# Patient Record
Sex: FEMALE | Race: WHITE | Employment: OTHER | URBAN - METROPOLITAN AREA
[De-identification: names, ages, dates, MRNs, and addresses within clinical notes are randomized per-mention and may not be internally consistent; named-entity substitution may affect disease eponyms.]

---

## 2017-01-05 ENCOUNTER — HOSPITAL ENCOUNTER (OUTPATIENT)
Dept: MAMMOGRAPHY | Age: 67
Discharge: HOME | End: 2017-01-05
Attending: INTERNAL MEDICINE
Payer: MEDICARE

## 2017-01-05 DIAGNOSIS — Z12.31 ENCOUNTER FOR SCREENING MAMMOGRAM FOR MALIGNANT NEOPLASM OF BREAST: ICD-10-CM

## 2017-01-05 DIAGNOSIS — Z00.00 ROUTINE GENERAL MEDICAL EXAMINATION AT A HEALTH CARE FACILITY: ICD-10-CM

## 2017-01-05 PROCEDURE — 77067 SCR MAMMO BI INCL CAD: CPT

## 2017-01-25 ENCOUNTER — OFFICE VISIT (OUTPATIENT)
Dept: INTERNAL MEDICINE CLINIC | Facility: CLINIC | Age: 67
End: 2017-01-25

## 2017-01-25 ENCOUNTER — LAB ENCOUNTER (OUTPATIENT)
Dept: LAB | Age: 67
End: 2017-01-25
Attending: INTERNAL MEDICINE
Payer: MEDICARE

## 2017-01-25 VITALS
DIASTOLIC BLOOD PRESSURE: 76 MMHG | RESPIRATION RATE: 16 BRPM | WEIGHT: 198.81 LBS | SYSTOLIC BLOOD PRESSURE: 122 MMHG | BODY MASS INDEX: 33.94 KG/M2 | HEART RATE: 68 BPM | HEIGHT: 64 IN

## 2017-01-25 DIAGNOSIS — Z01.818 PRE-OP EVALUATION: ICD-10-CM

## 2017-01-25 DIAGNOSIS — E03.9 ACQUIRED HYPOTHYROIDISM: ICD-10-CM

## 2017-01-25 DIAGNOSIS — F41.9 ANXIETY: ICD-10-CM

## 2017-01-25 DIAGNOSIS — Z01.818 PRE-OP EVALUATION: Primary | ICD-10-CM

## 2017-01-25 LAB
ALBUMIN SERPL-MCNC: 3.9 G/DL (ref 3.5–4.8)
ALP LIVER SERPL-CCNC: 102 U/L (ref 55–142)
ALT SERPL-CCNC: 24 U/L (ref 14–54)
AST SERPL-CCNC: 19 U/L (ref 15–41)
BASOPHILS # BLD AUTO: 0.05 X10(3) UL (ref 0–0.1)
BASOPHILS NFR BLD AUTO: 0.8 %
BILIRUB SERPL-MCNC: 0.3 MG/DL (ref 0.1–2)
BUN BLD-MCNC: 24 MG/DL (ref 8–20)
CALCIUM BLD-MCNC: 9.7 MG/DL (ref 8.3–10.3)
CHLORIDE: 103 MMOL/L (ref 101–111)
CO2: 30 MMOL/L (ref 22–32)
CREAT BLD-MCNC: 0.9 MG/DL (ref 0.55–1.02)
EOSINOPHIL # BLD AUTO: 0.14 X10(3) UL (ref 0–0.3)
EOSINOPHIL NFR BLD AUTO: 2.2 %
ERYTHROCYTE [DISTWIDTH] IN BLOOD BY AUTOMATED COUNT: 12.9 % (ref 11.5–16)
GLUCOSE BLD-MCNC: 95 MG/DL (ref 70–99)
HCT VFR BLD AUTO: 40.6 % (ref 34–50)
HGB BLD-MCNC: 13.5 G/DL (ref 12–16)
IMMATURE GRANULOCYTE COUNT: 0.01 X10(3) UL (ref 0–1)
IMMATURE GRANULOCYTE RATIO %: 0.2 %
LYMPHOCYTES # BLD AUTO: 1.79 X10(3) UL (ref 0.9–4)
LYMPHOCYTES NFR BLD AUTO: 28.5 %
M PROTEIN MFR SERPL ELPH: 7.5 G/DL (ref 6.1–8.3)
MCH RBC QN AUTO: 30.5 PG (ref 27–33.2)
MCHC RBC AUTO-ENTMCNC: 33.3 G/DL (ref 31–37)
MCV RBC AUTO: 91.9 FL (ref 81–100)
MONOCYTES # BLD AUTO: 0.54 X10(3) UL (ref 0.1–0.6)
MONOCYTES NFR BLD AUTO: 8.6 %
NEUTROPHIL ABS PRELIM: 3.75 X10 (3) UL (ref 1.3–6.7)
NEUTROPHILS # BLD AUTO: 3.75 X10(3) UL (ref 1.3–6.7)
NEUTROPHILS NFR BLD AUTO: 59.7 %
PLATELET # BLD AUTO: 278 10(3)UL (ref 150–450)
POTASSIUM SERPL-SCNC: 4.8 MMOL/L (ref 3.6–5.1)
RBC # BLD AUTO: 4.42 X10(6)UL (ref 3.8–5.1)
RED CELL DISTRIBUTION WIDTH-SD: 43.2 FL (ref 35.1–46.3)
SODIUM SERPL-SCNC: 138 MMOL/L (ref 136–144)
WBC # BLD AUTO: 6.3 X10(3) UL (ref 4–13)

## 2017-01-25 PROCEDURE — 93000 ELECTROCARDIOGRAM COMPLETE: CPT | Performed by: INTERNAL MEDICINE

## 2017-01-25 PROCEDURE — 85025 COMPLETE CBC W/AUTO DIFF WBC: CPT

## 2017-01-25 PROCEDURE — 80053 COMPREHEN METABOLIC PANEL: CPT

## 2017-01-25 PROCEDURE — 99213 OFFICE O/P EST LOW 20 MIN: CPT | Performed by: INTERNAL MEDICINE

## 2017-01-25 RX ORDER — LORAZEPAM 1 MG/1
1 TABLET ORAL EVERY 8 HOURS PRN
Qty: 30 TABLET | Refills: 0 | Status: SHIPPED | OUTPATIENT
Start: 2017-01-25 | End: 2018-01-08

## 2017-01-25 NOTE — PROGRESS NOTES
HPI:    Patient ID: Hilton Ruiz is a 74year old female. HPI  Here for pre-op risk evaluation prior to surgery with Dr. Bladimir Dee, no cp or sob, hypothyroid.   Anxiety is currently controlled, rare xanax  /76 mmHg  Pulse 68  Resp 16  Ht 64\" No murmur heard. Pulmonary/Chest: Effort normal and breath sounds normal.   Abdominal: Soft. Musculoskeletal: She exhibits no edema. Neurological: She is oriented to person, place, and time. Skin: Skin is warm. She is not diaphoretic.    Psychiatri

## 2017-02-07 ENCOUNTER — ANESTHESIA EVENT (OUTPATIENT)
Dept: SURGERY | Facility: HOSPITAL | Age: 67
End: 2017-02-07
Payer: MEDICARE

## 2017-02-08 ENCOUNTER — APPOINTMENT (OUTPATIENT)
Dept: GENERAL RADIOLOGY | Facility: HOSPITAL | Age: 67
End: 2017-02-08
Attending: UROLOGY
Payer: MEDICARE

## 2017-02-08 ENCOUNTER — SURGERY (OUTPATIENT)
Age: 67
End: 2017-02-08

## 2017-02-08 ENCOUNTER — ANESTHESIA (OUTPATIENT)
Dept: SURGERY | Facility: HOSPITAL | Age: 67
End: 2017-02-08
Payer: MEDICARE

## 2017-02-08 PROCEDURE — 76000 FLUOROSCOPY <1 HR PHYS/QHP: CPT

## 2017-02-08 PROCEDURE — 72100 X-RAY EXAM L-S SPINE 2/3 VWS: CPT

## 2017-02-08 NOTE — ANESTHESIA PREPROCEDURE EVALUATION
PRE-OP EVALUATION    Patient Name: Yfn Richter    Pre-op Diagnosis: URINARY RETENTION    Procedure(s):  INTERSTIM STAGE I    Surgeon(s) and Role:     * Priscila Guadalupe MD - Primary    Pre-op vitals reviewed.   Temp: 97.7 °F (36.5 °C)  Pulse: 74  Re Neuro/Psych      (+) depression  (+) anxiety                                Past Surgical History    COLONOSCOPY  09/21/07    Comment diverticulosis, Dr. Demi Gottlieb, Repeat in 7-10 years    BREAST SURGERY PROCEDURE UNLISTED  10/2000    Comment breast karthik

## 2017-02-08 NOTE — ANESTHESIA POSTPROCEDURE EVALUATION
[address] Patient Status:  Hospital Outpatient Surgery   Age/Gender 74year old female MRN [id number]   [facility] SURGERY Attending Nikki Barragan MD   Hosp Day # 0 PCP Rafi Garcia MD       Anesthesia Post-

## 2017-02-21 ENCOUNTER — ANESTHESIA EVENT (OUTPATIENT)
Dept: SURGERY | Facility: HOSPITAL | Age: 67
End: 2017-02-21
Payer: MEDICARE

## 2017-02-22 ENCOUNTER — ANESTHESIA (OUTPATIENT)
Dept: SURGERY | Facility: HOSPITAL | Age: 67
End: 2017-02-22
Payer: MEDICARE

## 2017-02-22 ENCOUNTER — SURGERY (OUTPATIENT)
Age: 67
End: 2017-02-22

## 2017-02-22 NOTE — ANESTHESIA POSTPROCEDURE EVALUATION
[address] Patient Status:  Hospital Outpatient Surgery   Age/Gender 74year old female MRN [id number]   Location [address] [place] [address] Attending Micky Vargas MD   Psychiatric Day # 0 PCP Shiva Allen

## 2017-02-22 NOTE — ANESTHESIA PREPROCEDURE EVALUATION
PRE-OP EVALUATION    Patient Name: Anisha Rodriguez    Pre-op Diagnosis: URINARY RETENTION     Procedure(s):  INTERSTIM STAGE TWO OR REMOVAL OF DEVICE      Surgeon(s) and Role:     * Prosper Canela MD - Primary    Pre-op vitals reviewed.         Body m Oral Tab Take 325 mg by mouth daily. Disp:  Rfl:        Allergies: Amoxicillin; Bactrim; Codeine; Levaquin      Anesthesia Evaluation    Patient summary reviewed.     Anesthetic Complications  (-) history of anesthetic complications         GI/Hepatic/Renal Pulmonary    Pulmonary exam normal.  Breath sounds clear to auscultation bilaterally. Other findings            ASA: 2   Plan: MAC  NPO status verified and   Patient has not taken beta blockers in last 24 hours.   Post-procedure pain managemen

## 2017-03-03 PROBLEM — N31.9 NEUROGENIC BLADDER: Status: ACTIVE | Noted: 2017-03-03

## 2017-03-03 PROBLEM — R33.9 CHRONIC RETENTION OF URINE: Status: ACTIVE | Noted: 2017-03-03

## 2017-03-20 ENCOUNTER — OFFICE VISIT (OUTPATIENT)
Dept: INTERNAL MEDICINE CLINIC | Facility: CLINIC | Age: 67
End: 2017-03-20

## 2017-03-20 VITALS
WEIGHT: 204 LBS | RESPIRATION RATE: 16 BRPM | HEART RATE: 74 BPM | SYSTOLIC BLOOD PRESSURE: 120 MMHG | BODY MASS INDEX: 34.83 KG/M2 | TEMPERATURE: 98 F | HEIGHT: 64 IN | DIASTOLIC BLOOD PRESSURE: 82 MMHG

## 2017-03-20 DIAGNOSIS — F32.A DEPRESSION, UNSPECIFIED DEPRESSION TYPE: ICD-10-CM

## 2017-03-20 DIAGNOSIS — E78.1 HYPERTRIGLYCERIDEMIA: ICD-10-CM

## 2017-03-20 DIAGNOSIS — Z00.00 ROUTINE GENERAL MEDICAL EXAMINATION AT A HEALTH CARE FACILITY: Primary | ICD-10-CM

## 2017-03-20 DIAGNOSIS — R53.83 FATIGUE, UNSPECIFIED TYPE: ICD-10-CM

## 2017-03-20 DIAGNOSIS — E03.9 HYPOTHYROIDISM (ACQUIRED): ICD-10-CM

## 2017-03-20 PROCEDURE — G0439 PPPS, SUBSEQ VISIT: HCPCS | Performed by: INTERNAL MEDICINE

## 2017-03-20 RX ORDER — LEVOTHYROXINE SODIUM 0.07 MG/1
75 TABLET ORAL DAILY
Qty: 90 TABLET | Refills: 3 | Status: SHIPPED | OUTPATIENT
Start: 2017-03-20 | End: 2018-03-28

## 2017-03-20 RX ORDER — FENOFIBRATE 160 MG/1
TABLET ORAL
Qty: 90 TABLET | Refills: 3 | Status: SHIPPED | OUTPATIENT
Start: 2017-03-20 | End: 2018-05-05

## 2017-03-20 RX ORDER — CITALOPRAM 40 MG/1
TABLET ORAL
Qty: 90 TABLET | Refills: 3 | Status: SHIPPED | OUTPATIENT
Start: 2017-03-20 | End: 2018-03-26

## 2017-03-20 NOTE — PROGRESS NOTES
HPI:    Patient ID: Nathan Nathan is a 74year old female.     HPI  Here for awv, complains of occ fatigue, states she wonders if thyroid dose or depression med dose might need to be adjusted, see awv, last cscope by Naina Bae 7 years ago, need to clarif exhibits no discharge. Neck: Neck supple. Cardiovascular: Normal rate, regular rhythm and normal heart sounds. No murmur heard. Pulmonary/Chest: Effort normal and breath sounds normal. She has no wheezes.    Breast exam done shows no masses, no nipp

## 2017-03-29 ENCOUNTER — APPOINTMENT (OUTPATIENT)
Dept: LAB | Age: 67
End: 2017-03-29
Attending: INTERNAL MEDICINE
Payer: MEDICARE

## 2017-03-29 DIAGNOSIS — E78.1 HYPERTRIGLYCERIDEMIA: ICD-10-CM

## 2017-03-29 DIAGNOSIS — E03.9 HYPOTHYROIDISM (ACQUIRED): ICD-10-CM

## 2017-03-29 PROCEDURE — 84443 ASSAY THYROID STIM HORMONE: CPT

## 2017-03-29 PROCEDURE — 80061 LIPID PANEL: CPT

## 2017-03-29 PROCEDURE — 36415 COLL VENOUS BLD VENIPUNCTURE: CPT

## 2017-04-10 ENCOUNTER — OFFICE VISIT (OUTPATIENT)
Dept: INTERNAL MEDICINE CLINIC | Facility: CLINIC | Age: 67
End: 2017-04-10

## 2017-04-10 VITALS
BODY MASS INDEX: 35.34 KG/M2 | RESPIRATION RATE: 12 BRPM | DIASTOLIC BLOOD PRESSURE: 80 MMHG | HEIGHT: 64 IN | SYSTOLIC BLOOD PRESSURE: 124 MMHG | WEIGHT: 207 LBS | HEART RATE: 80 BPM | TEMPERATURE: 98 F

## 2017-04-10 DIAGNOSIS — R53.83 FATIGUE, UNSPECIFIED TYPE: Primary | ICD-10-CM

## 2017-04-10 DIAGNOSIS — E78.00 HYPERCHOLESTEROLEMIA: ICD-10-CM

## 2017-04-10 DIAGNOSIS — G47.10 HYPERSOMNIA: ICD-10-CM

## 2017-04-10 PROCEDURE — 99213 OFFICE O/P EST LOW 20 MIN: CPT | Performed by: INTERNAL MEDICINE

## 2017-04-10 NOTE — PROGRESS NOTES
HPI:    Patient ID: Audra George is a 74year old female.     HPI  Here with fatigue, states falling asleep in afternoon, no witnessed apnea, tsh ok, struggling with wt  /80 mmHg  Pulse 80  Temp(Src) 97.8 °F (36.6 °C) (Oral)  Resp 12  Ht 64\" fenofibrate, restart and recheck lipids in 6 weeks      Orders Placed This Encounter  Vitamin B12    Meds This Visit:  No prescriptions requested or ordered in this encounter    Imaging & Referrals:  OP REFERRAL TO DIAGNOSTIC SLEEP STUDY       [facility]#[id number]

## 2017-05-02 ENCOUNTER — OFFICE VISIT (OUTPATIENT)
Dept: SLEEP CENTER | Facility: HOSPITAL | Age: 67
End: 2017-05-02
Attending: INTERNAL MEDICINE
Payer: MEDICARE

## 2017-05-02 PROCEDURE — 95810 POLYSOM 6/> YRS 4/> PARAM: CPT

## 2017-05-04 NOTE — PROCEDURES
[address]       Accredited by the [facility] ([facility])    PATIENT'S NAME:        Chacha Ponce  ATTENDING PHYSICIAN:   Yosvany Loza M.D.   REFERRING PHYSICIAN:   HOSEA Whiting significant obstructive apneas or hypopneas. The total apnea-hypopnea index was 4 events per hour. The patient demonstrated primary snoring throughout the study. Throughout the study, the patient maintained an oxyhemoglobin saturation above 90%.     ECG:

## 2017-05-12 ENCOUNTER — OFFICE VISIT (OUTPATIENT)
Dept: INTERNAL MEDICINE CLINIC | Facility: CLINIC | Age: 67
End: 2017-05-12

## 2017-05-12 VITALS
DIASTOLIC BLOOD PRESSURE: 78 MMHG | RESPIRATION RATE: 16 BRPM | WEIGHT: 208 LBS | HEIGHT: 64 IN | BODY MASS INDEX: 35.51 KG/M2 | HEART RATE: 76 BPM | SYSTOLIC BLOOD PRESSURE: 116 MMHG

## 2017-05-12 DIAGNOSIS — F32.A DEPRESSION, UNSPECIFIED DEPRESSION TYPE: ICD-10-CM

## 2017-05-12 DIAGNOSIS — R53.82 CHRONIC FATIGUE: Primary | ICD-10-CM

## 2017-05-12 DIAGNOSIS — E78.00 HYPERCHOLESTEREMIA: ICD-10-CM

## 2017-05-12 PROCEDURE — 99213 OFFICE O/P EST LOW 20 MIN: CPT | Performed by: INTERNAL MEDICINE

## 2017-05-12 RX ORDER — BUPROPION HYDROCHLORIDE 150 MG/1
150 TABLET ORAL DAILY
Qty: 30 TABLET | Refills: 3 | Status: SHIPPED | OUTPATIENT
Start: 2017-05-12 | End: 2017-09-05

## 2017-05-12 NOTE — PROGRESS NOTES
HPI:    Patient ID: Marah Johasnen is a 74year old female.     HPI  Here for fatigue, no sleep apnea and feels mildly depressed and tired  /78 mmHg  Pulse 76  Resp 16  Ht 64\"  Wt 208 lb  BMI 35.69 kg/m2    Review of Systems   Constitutional: Po Placed This Encounter  Lipid Panel [E]  Vitamin B12 [E]  CBC W Differential W Platelet [E]    Meds This Visit:  Signed Prescriptions Disp Refills    BuPROPion HCl ER, XL, 150 MG Oral Tablet 24 Hr 30 tablet 3      Sig: Take 1 tablet (150 mg total) by mouth

## 2017-06-18 ENCOUNTER — HOSPITAL ENCOUNTER (OUTPATIENT)
Age: 67
Discharge: HOME | End: 2017-06-18
Attending: FAMILY MEDICINE
Payer: MEDICARE

## 2017-06-18 VITALS
SYSTOLIC BLOOD PRESSURE: 135 MMHG | BODY MASS INDEX: 33.29 KG/M2 | OXYGEN SATURATION: 97 % | TEMPERATURE: 98 F | WEIGHT: 195 LBS | DIASTOLIC BLOOD PRESSURE: 78 MMHG | RESPIRATION RATE: 14 BRPM | HEART RATE: 76 BPM | HEIGHT: 64 IN

## 2017-06-18 DIAGNOSIS — N30.00 ACUTE CYSTITIS WITHOUT HEMATURIA: Primary | ICD-10-CM

## 2017-06-18 PROCEDURE — 99214 OFFICE O/P EST MOD 30 MIN: CPT

## 2017-06-18 PROCEDURE — 87086 URINE CULTURE/COLONY COUNT: CPT | Performed by: FAMILY MEDICINE

## 2017-06-18 PROCEDURE — 87186 SC STD MICRODIL/AGAR DIL: CPT | Performed by: FAMILY MEDICINE

## 2017-06-18 PROCEDURE — 87088 URINE BACTERIA CULTURE: CPT | Performed by: FAMILY MEDICINE

## 2017-06-18 PROCEDURE — 81002 URINALYSIS NONAUTO W/O SCOPE: CPT | Performed by: FAMILY MEDICINE

## 2017-06-18 RX ORDER — NITROFURANTOIN 25; 75 MG/1; MG/1
100 CAPSULE ORAL 2 TIMES DAILY
Qty: 14 CAPSULE | Refills: 0 | Status: SHIPPED | OUTPATIENT
Start: 2017-06-18 | End: 2017-06-25

## 2017-06-18 NOTE — ED INITIAL ASSESSMENT (HPI)
Patient states she has noted a foul odor from her urine since Monday. Does have a history of neurogenic bladder and chronic urinary retention. Does self caths at home and brought in a urine spec.

## 2017-06-18 NOTE — ED PROVIDER NOTES
Patient Seen in: [address] [facility] [place]    History   Patient presents with:  Urinary Symptoms (urologic)    Stated Complaint: Poss UTI    HPI    15-year-old female who has a history of neurogenic bladder presents to the clinic today with chief c Levothyroxine Sodium 75 MCG Oral Tab,  Take 1 tablet (75 mcg total) by mouth daily. LORazepam 1 MG Oral Tab,  Take 1 tablet (1 mg total) by mouth every 8 (eight) hours as needed. aspirin 325 MG Oral Tab,  Take 325 mg by mouth daily.    Bethanechol Chlor kg/m2  SpO2 97%  Breastfeeding? No        Physical Exam    General appearance: alert, appears stated age and cooperative  Head: Normocephalic, without obvious abnormality, atraumatic  Eyes: conjunctivae/corneas clear. PERRL, EOM's intact. Fundi benign.   Ea

## 2017-06-20 NOTE — ED NOTES
Patient given macrobid, noted sensitive.    URINE CULTURE, ROUTINE   Status: Final result     Visible to patient:  Not Released                   Specimen Information: Urine, clean catch; Urine                  Culture        >100,000 CFU/ML Escherichia col

## 2017-06-22 PROBLEM — R33.9 URINARY RETENTION: Status: ACTIVE | Noted: 2017-06-22

## 2017-07-05 ENCOUNTER — LAB ENCOUNTER (OUTPATIENT)
Dept: LAB | Age: 67
End: 2017-07-05
Attending: INTERNAL MEDICINE
Payer: MEDICARE

## 2017-07-05 DIAGNOSIS — E78.00 HYPERCHOLESTEREMIA: ICD-10-CM

## 2017-07-05 DIAGNOSIS — E78.00 HYPERCHOLESTEROLEMIA: ICD-10-CM

## 2017-07-05 DIAGNOSIS — R53.83 FATIGUE, UNSPECIFIED TYPE: ICD-10-CM

## 2017-07-05 DIAGNOSIS — R53.82 CHRONIC FATIGUE: ICD-10-CM

## 2017-07-05 LAB
BASOPHILS # BLD AUTO: 0.02 X10(3) UL (ref 0–0.1)
BASOPHILS NFR BLD AUTO: 0.5 %
CHOLEST SMN-MCNC: 194 MG/DL (ref ?–200)
EOSINOPHIL # BLD AUTO: 0.11 X10(3) UL (ref 0–0.3)
EOSINOPHIL NFR BLD AUTO: 2.5 %
ERYTHROCYTE [DISTWIDTH] IN BLOOD BY AUTOMATED COUNT: 13 % (ref 11.5–16)
HAV AB SERPL IA-ACNC: 434 PG/ML (ref 193–986)
HCT VFR BLD AUTO: 34.7 % (ref 34–50)
HDLC SERPL-MCNC: 63 MG/DL (ref 45–?)
HDLC SERPL: 3.08 {RATIO} (ref ?–4.44)
HGB BLD-MCNC: 11 G/DL (ref 12–16)
IMMATURE GRANULOCYTE COUNT: 0.01 X10(3) UL (ref 0–1)
IMMATURE GRANULOCYTE RATIO %: 0.2 %
LDLC SERPL CALC-MCNC: 111 MG/DL (ref ?–130)
LYMPHOCYTES # BLD AUTO: 1.58 X10(3) UL (ref 0.9–4)
LYMPHOCYTES NFR BLD AUTO: 36.2 %
MCH RBC QN AUTO: 30.6 PG (ref 27–33.2)
MCHC RBC AUTO-ENTMCNC: 31.7 G/DL (ref 31–37)
MCV RBC AUTO: 96.4 FL (ref 81–100)
MONOCYTES # BLD AUTO: 0.54 X10(3) UL (ref 0.1–0.6)
MONOCYTES NFR BLD AUTO: 12.4 %
NEUTROPHIL ABS PRELIM: 2.1 X10 (3) UL (ref 1.3–6.7)
NEUTROPHILS # BLD AUTO: 2.1 X10(3) UL (ref 1.3–6.7)
NEUTROPHILS NFR BLD AUTO: 48.2 %
NONHDLC SERPL-MCNC: 131 MG/DL (ref ?–130)
PLATELET # BLD AUTO: 257 10(3)UL (ref 150–450)
RBC # BLD AUTO: 3.6 X10(6)UL (ref 3.8–5.1)
RED CELL DISTRIBUTION WIDTH-SD: 46.1 FL (ref 35.1–46.3)
TRIGLYCERIDES: 102 MG/DL (ref ?–150)
VLDL: 20 MG/DL (ref 5–40)
WBC # BLD AUTO: 4.4 X10(3) UL (ref 4–13)

## 2017-07-05 PROCEDURE — 80061 LIPID PANEL: CPT

## 2017-07-05 PROCEDURE — 85025 COMPLETE CBC W/AUTO DIFF WBC: CPT

## 2017-07-05 PROCEDURE — 82607 VITAMIN B-12: CPT

## 2017-07-05 PROCEDURE — 36415 COLL VENOUS BLD VENIPUNCTURE: CPT

## 2017-07-06 DIAGNOSIS — D64.9 ANEMIA, UNSPECIFIED TYPE: Primary | ICD-10-CM

## 2017-07-14 PROCEDURE — 81015 MICROSCOPIC EXAM OF URINE: CPT | Performed by: UROLOGY

## 2017-07-20 ENCOUNTER — LAB ENCOUNTER (OUTPATIENT)
Dept: LAB | Age: 67
End: 2017-07-20
Attending: INTERNAL MEDICINE
Payer: MEDICARE

## 2017-07-20 DIAGNOSIS — D64.9 ANEMIA, UNSPECIFIED TYPE: ICD-10-CM

## 2017-07-20 LAB
BASOPHILS # BLD AUTO: 0.04 X10(3) UL (ref 0–0.1)
BASOPHILS NFR BLD AUTO: 0.8 %
DEPRECATED HBV CORE AB SER IA-ACNC: 73.8 NG/ML (ref 10–291)
EOSINOPHIL # BLD AUTO: 0.12 X10(3) UL (ref 0–0.3)
EOSINOPHIL NFR BLD AUTO: 2.4 %
ERYTHROCYTE [DISTWIDTH] IN BLOOD BY AUTOMATED COUNT: 12.4 % (ref 11.5–16)
HCT VFR BLD AUTO: 37.9 % (ref 34–50)
HGB BLD-MCNC: 12.3 G/DL (ref 12–16)
IMMATURE GRANULOCYTE COUNT: 0.01 X10(3) UL (ref 0–1)
IMMATURE GRANULOCYTE RATIO %: 0.2 %
IRON SATURATION: 16 % (ref 13–45)
IRON: 76 UG/DL (ref 28–170)
LYMPHOCYTES # BLD AUTO: 2.04 X10(3) UL (ref 0.9–4)
LYMPHOCYTES NFR BLD AUTO: 40.7 %
MCH RBC QN AUTO: 30.4 PG (ref 27–33.2)
MCHC RBC AUTO-ENTMCNC: 32.5 G/DL (ref 31–37)
MCV RBC AUTO: 93.6 FL (ref 81–100)
MONOCYTES # BLD AUTO: 0.64 X10(3) UL (ref 0.1–0.6)
MONOCYTES NFR BLD AUTO: 12.8 %
NEUTROPHIL ABS PRELIM: 2.16 X10 (3) UL (ref 1.3–6.7)
NEUTROPHILS # BLD AUTO: 2.16 X10(3) UL (ref 1.3–6.7)
NEUTROPHILS NFR BLD AUTO: 43.1 %
PLATELET # BLD AUTO: 324 10(3)UL (ref 150–450)
RBC # BLD AUTO: 4.05 X10(6)UL (ref 3.8–5.1)
RED CELL DISTRIBUTION WIDTH-SD: 42.6 FL (ref 35.1–46.3)
RETIC ABS CALC AUTO: 55.5 X10(3) UL (ref 22.5–147.5)
RETIC IRF CALC: 0.08 RATIO (ref 0.09–0.3)
RETIC%: 1.4 % (ref 0.5–2.5)
RETICULOCYTE HEMOGLOBIN EQUIVALENT: 36.1 PG (ref 28.2–36.3)
TOTAL IRON BINDING CAPACITY: 477 UG/DL (ref 298–536)
TRANSFERRIN: 320 MG/DL (ref 200–360)
WBC # BLD AUTO: 5 X10(3) UL (ref 4–13)

## 2017-07-20 PROCEDURE — 36415 COLL VENOUS BLD VENIPUNCTURE: CPT

## 2017-07-20 PROCEDURE — 82728 ASSAY OF FERRITIN: CPT

## 2017-07-20 PROCEDURE — 85045 AUTOMATED RETICULOCYTE COUNT: CPT

## 2017-07-20 PROCEDURE — 83550 IRON BINDING TEST: CPT

## 2017-07-20 PROCEDURE — 83540 ASSAY OF IRON: CPT

## 2017-07-20 PROCEDURE — 85025 COMPLETE CBC W/AUTO DIFF WBC: CPT

## 2017-08-17 RX ORDER — LORAZEPAM 1 MG/1
1 TABLET ORAL EVERY 8 HOURS PRN
Qty: 30 TABLET | Refills: 0 | Status: CANCELLED
Start: 2017-08-17

## 2017-09-05 RX ORDER — BUPROPION HYDROCHLORIDE 150 MG/1
TABLET ORAL
Qty: 30 TABLET | Refills: 3 | Status: SHIPPED | OUTPATIENT
Start: 2017-09-05 | End: 2018-01-04

## 2017-10-07 ENCOUNTER — HOSPITAL ENCOUNTER (OUTPATIENT)
Age: 67
Discharge: HOME | End: 2017-10-07
Attending: EMERGENCY MEDICINE
Payer: MEDICARE

## 2017-10-07 ENCOUNTER — APPOINTMENT (OUTPATIENT)
Dept: CT IMAGING | Age: 67
End: 2017-10-07
Attending: EMERGENCY MEDICINE
Payer: MEDICARE

## 2017-10-07 VITALS
HEART RATE: 68 BPM | DIASTOLIC BLOOD PRESSURE: 69 MMHG | RESPIRATION RATE: 16 BRPM | OXYGEN SATURATION: 98 % | SYSTOLIC BLOOD PRESSURE: 130 MMHG | TEMPERATURE: 98 F

## 2017-10-07 DIAGNOSIS — N30.00 ACUTE CYSTITIS WITHOUT HEMATURIA: ICD-10-CM

## 2017-10-07 DIAGNOSIS — K63.89 EPIPLOIC APPENDAGITIS: Primary | ICD-10-CM

## 2017-10-07 PROCEDURE — 74176 CT ABD & PELVIS W/O CONTRAST: CPT | Performed by: EMERGENCY MEDICINE

## 2017-10-07 PROCEDURE — 87077 CULTURE AEROBIC IDENTIFY: CPT | Performed by: EMERGENCY MEDICINE

## 2017-10-07 PROCEDURE — 85025 COMPLETE CBC W/AUTO DIFF WBC: CPT | Performed by: EMERGENCY MEDICINE

## 2017-10-07 PROCEDURE — 87186 SC STD MICRODIL/AGAR DIL: CPT | Performed by: EMERGENCY MEDICINE

## 2017-10-07 PROCEDURE — 80047 BASIC METABLC PNL IONIZED CA: CPT

## 2017-10-07 PROCEDURE — 81002 URINALYSIS NONAUTO W/O SCOPE: CPT | Performed by: EMERGENCY MEDICINE

## 2017-10-07 PROCEDURE — 87086 URINE CULTURE/COLONY COUNT: CPT | Performed by: EMERGENCY MEDICINE

## 2017-10-07 PROCEDURE — 99214 OFFICE O/P EST MOD 30 MIN: CPT

## 2017-10-07 RX ORDER — CEPHALEXIN 500 MG/1
500 CAPSULE ORAL 2 TIMES DAILY
Qty: 14 CAPSULE | Refills: 0 | Status: SHIPPED | OUTPATIENT
Start: 2017-10-07 | End: 2017-10-14

## 2017-10-07 NOTE — ED PROVIDER NOTES
Patient presents with:  Abdominal Pain    HPI:     Brian Rocha is a 74year old female with hx of neurogenic bladder (self caths and has stimulator) who presents with chief complaint of RLQ abdominal pain.   Started 3 days ago while visiting her mot Abnormal; Notable for the following:     ISTAT BUN 23 (*)     All other components within normal limits   POCT CBC - Normal   URINE CULTURE, ROUTINE     Ct Appendix Abd/pel Wo Contrast (cpt=74176)    Result Date: 10/7/2017  PROCEDURE:  CT APPENDIX ABD/PEL Mild degenerative disease. CONCLUSION:  #1. Epiploic appendigitis within the right lower quadrant. #2. There is a 1.2 cm left adrenal adenoma. #3. Colonic diverticulosis without evidence of diverticulitis.     Dictated by: Shira Henderson MD on 10/07

## 2017-10-07 NOTE — ED INITIAL ASSESSMENT (HPI)
Pt presents to [facility] with abdominal pain since waking Thursday. Pt points to right lower quadrant, and rates pain 8/10 with movement. Pt denies fever,nausea, diarrhea, or vomiting. Last normal BM this morning.  Pt has neurogenic bladder with retention, and st

## 2017-11-07 ENCOUNTER — APPOINTMENT (OUTPATIENT)
Dept: GENERAL RADIOLOGY | Age: 67
End: 2017-11-07
Attending: EMERGENCY MEDICINE
Payer: MEDICARE

## 2017-11-07 ENCOUNTER — HOSPITAL ENCOUNTER (OUTPATIENT)
Age: 67
Discharge: HOME | End: 2017-11-07
Attending: EMERGENCY MEDICINE
Payer: MEDICARE

## 2017-11-07 VITALS
HEART RATE: 70 BPM | TEMPERATURE: 98 F | SYSTOLIC BLOOD PRESSURE: 110 MMHG | OXYGEN SATURATION: 98 % | RESPIRATION RATE: 16 BRPM | DIASTOLIC BLOOD PRESSURE: 72 MMHG

## 2017-11-07 DIAGNOSIS — J40 BRONCHITIS: Primary | ICD-10-CM

## 2017-11-07 PROCEDURE — 99214 OFFICE O/P EST MOD 30 MIN: CPT

## 2017-11-07 PROCEDURE — 71020 XR CHEST PA + LAT CHEST (CPT=71020): CPT | Performed by: EMERGENCY MEDICINE

## 2017-11-07 PROCEDURE — 87081 CULTURE SCREEN ONLY: CPT | Performed by: EMERGENCY MEDICINE

## 2017-11-07 PROCEDURE — 87430 STREP A AG IA: CPT | Performed by: EMERGENCY MEDICINE

## 2017-11-07 RX ORDER — BENZONATATE 200 MG/1
200 CAPSULE ORAL 3 TIMES DAILY PRN
Qty: 30 CAPSULE | Refills: 0 | Status: SHIPPED | OUTPATIENT
Start: 2017-11-07 | End: 2017-12-07

## 2017-11-07 RX ORDER — DOXYCYCLINE HYCLATE 100 MG/1
100 CAPSULE ORAL 2 TIMES DAILY
Qty: 20 CAPSULE | Refills: 0 | Status: SHIPPED | OUTPATIENT
Start: 2017-11-07 | End: 2017-11-17

## 2017-11-07 NOTE — ED PROVIDER NOTES
Patient presents with:  Sore Throat    HPI:     Audra George is a 74year old female who presents with chief complaint of sore throat, cough. Started a week ago with n/v/d and sore throat. The n/v/d ended after about 24 hours.   Then progressed in Xr Chest Pa + Lat Chest (pcd=96271)    Result Date: 11/7/2017  PROCEDURE:  XR CHEST PA + LAT CHEST (CPT=71020)  INDICATIONS:  coughing sore throat fever  COMPARISON:  [facility], CHEST PA   LATERAL, 2/04/2010, 15:42.   TECHNIQUE:  RAGHAVENDRA batista

## 2017-11-07 NOTE — ED INITIAL ASSESSMENT (HPI)
One ago had vomiting and diarrhea with a fever, cough and sore throat started 5 days, undocumented fever for off and on for one week.

## 2017-11-08 NOTE — ADDENDUM NOTE
Addendum  created 02/22/17 0940 by Asiya Dominguez MD    Modules edited: Orders, PRL Based Order Sets EOMI; PERRL; no drainage or redness

## 2017-12-13 ENCOUNTER — TELEPHONE (OUTPATIENT)
Dept: INTERNAL MEDICINE CLINIC | Facility: CLINIC | Age: 67
End: 2017-12-13

## 2017-12-13 DIAGNOSIS — Z12.31 ENCOUNTER FOR SCREENING MAMMOGRAM FOR MALIGNANT NEOPLASM OF BREAST: Primary | ICD-10-CM

## 2018-01-04 RX ORDER — BUPROPION HYDROCHLORIDE 150 MG/1
TABLET, EXTENDED RELEASE ORAL
Qty: 30 TABLET | Refills: 3 | Status: SHIPPED | OUTPATIENT
Start: 2018-01-04 | End: 2018-01-08

## 2018-01-04 NOTE — TELEPHONE ENCOUNTER
LOV: 5/12/17  Future office visit: 1/8/18  Last labs: 7/20/17 Iron Ferritin Cbc 7/5/17 Lipid   Last RX: 9/5/17 #30 #3 Refill  Per protocol: Route to provider

## 2018-01-08 ENCOUNTER — OFFICE VISIT (OUTPATIENT)
Dept: INTERNAL MEDICINE CLINIC | Facility: CLINIC | Age: 68
End: 2018-01-08

## 2018-01-08 VITALS
HEIGHT: 64 IN | SYSTOLIC BLOOD PRESSURE: 124 MMHG | BODY MASS INDEX: 35.51 KG/M2 | WEIGHT: 208 LBS | HEART RATE: 86 BPM | DIASTOLIC BLOOD PRESSURE: 72 MMHG | TEMPERATURE: 98 F | RESPIRATION RATE: 16 BRPM

## 2018-01-08 DIAGNOSIS — Z12.11 SPECIAL SCREENING FOR MALIGNANT NEOPLASMS, COLON: ICD-10-CM

## 2018-01-08 DIAGNOSIS — F32.A DEPRESSION, UNSPECIFIED DEPRESSION TYPE: Primary | ICD-10-CM

## 2018-01-08 PROCEDURE — 99213 OFFICE O/P EST LOW 20 MIN: CPT | Performed by: INTERNAL MEDICINE

## 2018-01-08 RX ORDER — BUPROPION HYDROCHLORIDE 300 MG/1
300 TABLET ORAL DAILY
Qty: 30 TABLET | Refills: 3 | Status: SHIPPED | OUTPATIENT
Start: 2018-01-08 | End: 2018-05-22

## 2018-01-08 RX ORDER — INFLUENZA A VIRUS A/CHRISTCHURCH/16/2010 NIB-74 (H1N1) HEMAGGLUTININ ANTIGEN (PROPIOLACTONE INACTIVATED), INFLUENZA A VIRUS A/SWITZERLAND/9715293/2013, NIB-88 (H3N2) HEMAGGLUTININ ANTIGEN (PROPIOLACTONE INACTIVATED), INFLUENZA B VIRUS B/PHUKET/3073/2013 - WILD TYPE HEMAGGLUTININ ANTIGEN (PROPIOLACTONE INACTIVATED) 15; 15; 15 UG/.5ML; UG/.5ML; UG/.5ML
INJECTION, SUSPENSION INTRAMUSCULAR
Refills: 0 | COMMUNITY
Start: 2017-12-06 | End: 2018-01-08

## 2018-01-08 RX ORDER — LORAZEPAM 1 MG/1
1 TABLET ORAL EVERY 8 HOURS PRN
Qty: 20 TABLET | Refills: 0 | Status: SHIPPED | OUTPATIENT
Start: 2018-01-08 | End: 2018-06-01

## 2018-01-08 NOTE — PROGRESS NOTES
HPI:    Patient ID: Satish Plummer is a 69year old female.     HPI  Here overdue for fu on depression, better but still not optimal, not suicidal  /72 (BP Location: Left arm, Patient Position: Sitting, Cuff Size: adult)   Pulse 86   Temp 97.9 °F in this encounter. Meds This Visit:  Pending Prescriptions Disp Refills    LORazepam 1 MG Oral Tab 30 tablet 0     Sig: Take 1 tablet (1 mg total) by mouth every 8 (eight) hours as needed.          Imaging & Referrals:  None       [id number]

## 2018-01-11 ENCOUNTER — HOSPITAL ENCOUNTER (OUTPATIENT)
Dept: MAMMOGRAPHY | Age: 68
Discharge: HOME | End: 2018-01-11
Attending: INTERNAL MEDICINE
Payer: MEDICARE

## 2018-01-11 DIAGNOSIS — Z12.31 ENCOUNTER FOR SCREENING MAMMOGRAM FOR MALIGNANT NEOPLASM OF BREAST: ICD-10-CM

## 2018-01-11 PROCEDURE — 77067 SCR MAMMO BI INCL CAD: CPT | Performed by: INTERNAL MEDICINE

## 2018-03-15 RX ORDER — CEFAZOLIN SODIUM/WATER 2 G/20 ML
2 SYRINGE (ML) INTRAVENOUS ONCE
Status: DISCONTINUED | OUTPATIENT
Start: 2018-03-15 | End: 2018-04-09

## 2018-03-15 RX ORDER — SODIUM CHLORIDE, SODIUM LACTATE, POTASSIUM CHLORIDE, CALCIUM CHLORIDE 600; 310; 30; 20 MG/100ML; MG/100ML; MG/100ML; MG/100ML
INJECTION, SOLUTION INTRAVENOUS CONTINUOUS
Status: CANCELLED | OUTPATIENT
Start: 2018-03-15

## 2018-03-15 RX ORDER — CEFAZOLIN SODIUM/WATER 2 G/20 ML
2 SYRINGE (ML) INTRAVENOUS ONCE
Status: CANCELLED | OUTPATIENT
Start: 2018-03-15 | End: 2018-03-15

## 2018-03-23 ENCOUNTER — OFFICE VISIT (OUTPATIENT)
Dept: INTERNAL MEDICINE CLINIC | Facility: CLINIC | Age: 68
End: 2018-03-23

## 2018-03-23 VITALS
TEMPERATURE: 98 F | BODY MASS INDEX: 34.57 KG/M2 | HEART RATE: 72 BPM | DIASTOLIC BLOOD PRESSURE: 80 MMHG | RESPIRATION RATE: 12 BRPM | HEIGHT: 64.5 IN | SYSTOLIC BLOOD PRESSURE: 122 MMHG | WEIGHT: 205 LBS

## 2018-03-23 DIAGNOSIS — E78.1 HYPERTRIGLYCERIDEMIA: ICD-10-CM

## 2018-03-23 DIAGNOSIS — F32.A DEPRESSION, UNSPECIFIED DEPRESSION TYPE: ICD-10-CM

## 2018-03-23 DIAGNOSIS — Z01.818 PRE-OP EVALUATION: Primary | ICD-10-CM

## 2018-03-23 PROCEDURE — 99213 OFFICE O/P EST LOW 20 MIN: CPT | Performed by: INTERNAL MEDICINE

## 2018-03-23 PROCEDURE — 93000 ELECTROCARDIOGRAM COMPLETE: CPT | Performed by: INTERNAL MEDICINE

## 2018-03-23 NOTE — PROGRESS NOTES
HPI:    Patient ID: Satish Plummer is a 69year old female. HPI  Here for pre op risk evaluation at the request of Dr. Matthew Richard.   Depression well controlled, much improved with Wellbutrin, anxiety and hypothyroidism  /80 (BP Location: Right arm, well-nourished. HENT:   Head: Normocephalic and atraumatic. Right Ear: External ear normal.   Left Ear: External ear normal.   Eyes: Pupils are equal, round, and reactive to light. Right eye exhibits no discharge. Neck: Neck supple.    Cardiovascular:

## 2018-03-26 ENCOUNTER — LAB ENCOUNTER (OUTPATIENT)
Dept: LAB | Age: 68
End: 2018-03-26
Attending: INTERNAL MEDICINE
Payer: MEDICARE

## 2018-03-26 DIAGNOSIS — Z01.818 PRE-OP EVALUATION: ICD-10-CM

## 2018-03-26 LAB
ALBUMIN SERPL-MCNC: 3.8 G/DL (ref 3.5–4.8)
ALP LIVER SERPL-CCNC: 72 U/L (ref 55–142)
ALT SERPL-CCNC: 18 U/L (ref 14–54)
AST SERPL-CCNC: 16 U/L (ref 15–41)
BASOPHILS # BLD AUTO: 0.04 X10(3) UL (ref 0–0.1)
BASOPHILS NFR BLD AUTO: 0.8 %
BILIRUB SERPL-MCNC: 0.4 MG/DL (ref 0.1–2)
BUN BLD-MCNC: 20 MG/DL (ref 8–20)
CALCIUM BLD-MCNC: 9.5 MG/DL (ref 8.3–10.3)
CHLORIDE: 108 MMOL/L (ref 101–111)
CO2: 27 MMOL/L (ref 22–32)
CREAT BLD-MCNC: 1 MG/DL (ref 0.55–1.02)
EOSINOPHIL # BLD AUTO: 0.14 X10(3) UL (ref 0–0.3)
EOSINOPHIL NFR BLD AUTO: 2.9 %
ERYTHROCYTE [DISTWIDTH] IN BLOOD BY AUTOMATED COUNT: 13.1 % (ref 11.5–16)
GLUCOSE BLD-MCNC: 97 MG/DL (ref 70–99)
HCT VFR BLD AUTO: 39 % (ref 34–50)
HGB BLD-MCNC: 12.6 G/DL (ref 12–16)
IMMATURE GRANULOCYTE COUNT: 0.03 X10(3) UL (ref 0–1)
IMMATURE GRANULOCYTE RATIO %: 0.6 %
LYMPHOCYTES # BLD AUTO: 1.36 X10(3) UL (ref 0.9–4)
LYMPHOCYTES NFR BLD AUTO: 28.5 %
M PROTEIN MFR SERPL ELPH: 7.3 G/DL (ref 6.1–8.3)
MCH RBC QN AUTO: 30.5 PG (ref 27–33.2)
MCHC RBC AUTO-ENTMCNC: 32.3 G/DL (ref 31–37)
MCV RBC AUTO: 94.4 FL (ref 81–100)
MONOCYTES # BLD AUTO: 0.47 X10(3) UL (ref 0.1–1)
MONOCYTES NFR BLD AUTO: 9.9 %
NEUTROPHIL ABS PRELIM: 2.73 X10 (3) UL (ref 1.3–6.7)
NEUTROPHILS # BLD AUTO: 2.73 X10(3) UL (ref 1.3–6.7)
NEUTROPHILS NFR BLD AUTO: 57.3 %
PLATELET # BLD AUTO: 308 10(3)UL (ref 150–450)
POTASSIUM SERPL-SCNC: 5.3 MMOL/L (ref 3.6–5.1)
RBC # BLD AUTO: 4.13 X10(6)UL (ref 3.8–5.1)
RED CELL DISTRIBUTION WIDTH-SD: 45.3 FL (ref 35.1–46.3)
SODIUM SERPL-SCNC: 139 MMOL/L (ref 136–144)
WBC # BLD AUTO: 4.8 X10(3) UL (ref 4–13)

## 2018-03-26 PROCEDURE — 36415 COLL VENOUS BLD VENIPUNCTURE: CPT

## 2018-03-26 PROCEDURE — 80053 COMPREHEN METABOLIC PANEL: CPT

## 2018-03-26 PROCEDURE — 85025 COMPLETE CBC W/AUTO DIFF WBC: CPT

## 2018-03-26 RX ORDER — CITALOPRAM 40 MG/1
40 TABLET ORAL DAILY
Qty: 90 TABLET | Refills: 1 | Status: SHIPPED | OUTPATIENT
Start: 2018-03-26 | End: 2018-10-03

## 2018-03-26 NOTE — TELEPHONE ENCOUNTER
Last Office Visit: 3-23-18 with EMILY for pre op   Last Rx Filled: 3-20-17 90 tabs with 3 refills   Last Labs: 3-26-18 cbc/cmp  Future Appointment: 6-1-18    Per protocol to provider

## 2018-03-27 ENCOUNTER — TELEPHONE (OUTPATIENT)
Dept: INTERNAL MEDICINE CLINIC | Facility: CLINIC | Age: 68
End: 2018-03-27

## 2018-03-27 DIAGNOSIS — E87.5 HYPERKALEMIA: Primary | ICD-10-CM

## 2018-03-27 NOTE — PROGRESS NOTES
Mild hyperkalemia. Have pt avoid high K foods (bananas, oranges etc) and repeat BMP tomorrow. These were pre-op labs. I entered BMP order.

## 2018-03-27 NOTE — TELEPHONE ENCOUNTER
Refaxed correct paperwork to [facility] at Dr. Faheem Reed office as requested. Confirmation received. EKG sent to scan, copy filed in Preoperative paperwork at Triage.

## 2018-03-27 NOTE — TELEPHONE ENCOUNTER
Joey Polk from [facility] surgery scheduling at Dr. Jeevan Arreola office called and stated that she received a fax with the fax cover having the name Paige Aguayo and the EKG and H&P was for the pt Imelda Guerra.  Joey Polk said that she will shred that and if we co

## 2018-03-28 RX ORDER — LEVOTHYROXINE SODIUM 0.07 MG/1
TABLET ORAL
Qty: 90 TABLET | Refills: 1 | Status: SHIPPED | OUTPATIENT
Start: 2018-03-28 | End: 2018-09-21

## 2018-04-03 ENCOUNTER — LAB ENCOUNTER (OUTPATIENT)
Dept: LAB | Age: 68
End: 2018-04-03
Attending: PHYSICIAN ASSISTANT
Payer: MEDICARE

## 2018-04-03 DIAGNOSIS — E87.5 HYPERKALEMIA: ICD-10-CM

## 2018-04-03 PROCEDURE — 36415 COLL VENOUS BLD VENIPUNCTURE: CPT

## 2018-04-03 PROCEDURE — 80048 BASIC METABOLIC PNL TOTAL CA: CPT

## 2018-04-05 NOTE — PROGRESS NOTES
Hyperkalemia has resolved. Cr is very mildly elevated. Appears pt may be a little dehydrated. Hydrate well and recheck BMP in 4 weeks (non-fasting).

## 2018-04-09 RX ORDER — SODIUM CHLORIDE, SODIUM LACTATE, POTASSIUM CHLORIDE, CALCIUM CHLORIDE 600; 310; 30; 20 MG/100ML; MG/100ML; MG/100ML; MG/100ML
INJECTION, SOLUTION INTRAVENOUS CONTINUOUS
Status: CANCELLED | OUTPATIENT
Start: 2018-04-09

## 2018-04-09 RX ORDER — CEFAZOLIN SODIUM/WATER 2 G/20 ML
2 SYRINGE (ML) INTRAVENOUS ONCE
Status: CANCELLED | OUTPATIENT
Start: 2018-04-09 | End: 2018-04-09

## 2018-04-10 ENCOUNTER — SURGERY (OUTPATIENT)
Age: 68
End: 2018-04-10

## 2018-04-10 ENCOUNTER — ANESTHESIA (OUTPATIENT)
Dept: SURGERY | Facility: HOSPITAL | Age: 68
End: 2018-04-10
Payer: MEDICARE

## 2018-04-10 ENCOUNTER — APPOINTMENT (OUTPATIENT)
Dept: GENERAL RADIOLOGY | Facility: HOSPITAL | Age: 68
End: 2018-04-10
Attending: UROLOGY
Payer: MEDICARE

## 2018-04-10 ENCOUNTER — ANESTHESIA EVENT (OUTPATIENT)
Dept: SURGERY | Facility: HOSPITAL | Age: 68
End: 2018-04-10
Payer: MEDICARE

## 2018-04-10 ENCOUNTER — HOSPITAL ENCOUNTER (OUTPATIENT)
Facility: HOSPITAL | Age: 68
Setting detail: HOSPITAL OUTPATIENT SURGERY
Discharge: HOME | End: 2018-04-10
Attending: UROLOGY | Admitting: UROLOGY
Payer: MEDICARE

## 2018-04-10 VITALS
OXYGEN SATURATION: 99 % | WEIGHT: 209 LBS | HEART RATE: 76 BPM | HEIGHT: 64.5 IN | RESPIRATION RATE: 18 BRPM | SYSTOLIC BLOOD PRESSURE: 116 MMHG | TEMPERATURE: 99 F | DIASTOLIC BLOOD PRESSURE: 73 MMHG | BODY MASS INDEX: 35.25 KG/M2

## 2018-04-10 DIAGNOSIS — R33.9 RETENTION OF URINE, UNSPECIFIED: ICD-10-CM

## 2018-04-10 DIAGNOSIS — Z01.818 ENCOUNTER FOR PREADMISSION TESTING: ICD-10-CM

## 2018-04-10 PROCEDURE — 76001 XR FLUOROSCOPE EXAM >1 HR EXTENSIVE (CPT=76001): CPT | Performed by: UROLOGY

## 2018-04-10 PROCEDURE — A4216 STERILE WATER/SALINE, 10 ML: HCPCS

## 2018-04-10 PROCEDURE — 01HY3MZ INSERTION OF NEUROSTIMULATOR LEAD INTO PERIPHERAL NERVE, PERCUTANEOUS APPROACH: ICD-10-PCS | Performed by: UROLOGY

## 2018-04-10 PROCEDURE — 95940 IONM IN OPERATNG ROOM 15 MIN: CPT | Performed by: UROLOGY

## 2018-04-10 PROCEDURE — 95870 NDL EMG LMTD STD MUSC 1 XTR: CPT | Performed by: UROLOGY

## 2018-04-10 DEVICE — IMPLANTABLE DEVICE: Type: IMPLANTABLE DEVICE | Site: BLADDER | Status: FUNCTIONAL

## 2018-04-10 RX ORDER — ASPIRIN 325 MG
325 TABLET ORAL DAILY
Qty: 30 TABLET | Refills: 0 | Status: ON HOLD | OUTPATIENT
Start: 2018-04-12 | End: 2021-05-13

## 2018-04-10 RX ORDER — CEFAZOLIN SODIUM/WATER 2 G/20 ML
2 SYRINGE (ML) INTRAVENOUS ONCE
Status: DISCONTINUED | OUTPATIENT
Start: 2018-04-10 | End: 2018-04-10 | Stop reason: HOSPADM

## 2018-04-10 RX ORDER — MORPHINE SULFATE 4 MG/ML
2 INJECTION, SOLUTION INTRAMUSCULAR; INTRAVENOUS EVERY 5 MIN PRN
Status: DISCONTINUED | OUTPATIENT
Start: 2018-04-10 | End: 2018-04-10

## 2018-04-10 RX ORDER — DEXAMETHASONE SODIUM PHOSPHATE 4 MG/ML
4 VIAL (ML) INJECTION AS NEEDED
Status: DISCONTINUED | OUTPATIENT
Start: 2018-04-10 | End: 2018-04-10

## 2018-04-10 RX ORDER — CEFDINIR 300 MG/1
CAPSULE ORAL
Qty: 10 CAPSULE | Refills: 0 | Status: SHIPPED | OUTPATIENT
Start: 2018-04-10 | End: 2018-04-18 | Stop reason: ALTCHOICE

## 2018-04-10 RX ORDER — ONDANSETRON 2 MG/ML
4 INJECTION INTRAMUSCULAR; INTRAVENOUS AS NEEDED
Status: DISCONTINUED | OUTPATIENT
Start: 2018-04-10 | End: 2018-04-10

## 2018-04-10 RX ORDER — BACITRACIN 50000 [USP'U]/1
INJECTION, POWDER, LYOPHILIZED, FOR SOLUTION INTRAMUSCULAR AS NEEDED
Status: DISCONTINUED | OUTPATIENT
Start: 2018-04-10 | End: 2018-04-10 | Stop reason: HOSPADM

## 2018-04-10 RX ORDER — SODIUM CHLORIDE, SODIUM LACTATE, POTASSIUM CHLORIDE, CALCIUM CHLORIDE 600; 310; 30; 20 MG/100ML; MG/100ML; MG/100ML; MG/100ML
INJECTION, SOLUTION INTRAVENOUS CONTINUOUS
Status: DISCONTINUED | OUTPATIENT
Start: 2018-04-10 | End: 2018-04-10

## 2018-04-10 RX ORDER — PROTAMINE SULFATE 10 MG/ML
INJECTION, SOLUTION INTRAVENOUS AS NEEDED
Status: DISCONTINUED | OUTPATIENT
Start: 2018-04-10 | End: 2018-04-10 | Stop reason: HOSPADM

## 2018-04-10 RX ORDER — BUPIVACAINE HYDROCHLORIDE AND EPINEPHRINE 2.5; 5 MG/ML; UG/ML
INJECTION, SOLUTION EPIDURAL; INFILTRATION; INTRACAUDAL; PERINEURAL AS NEEDED
Status: DISCONTINUED | OUTPATIENT
Start: 2018-04-10 | End: 2018-04-10 | Stop reason: HOSPADM

## 2018-04-10 RX ORDER — MEPERIDINE HYDROCHLORIDE 25 MG/ML
12.5 INJECTION INTRAMUSCULAR; INTRAVENOUS; SUBCUTANEOUS AS NEEDED
Status: DISCONTINUED | OUTPATIENT
Start: 2018-04-10 | End: 2018-04-10

## 2018-04-10 RX ORDER — HYDROCODONE BITARTRATE AND ACETAMINOPHEN 5; 325 MG/1; MG/1
1 TABLET ORAL AS NEEDED
Status: DISCONTINUED | OUTPATIENT
Start: 2018-04-10 | End: 2018-04-10

## 2018-04-10 RX ORDER — MIDAZOLAM HYDROCHLORIDE 1 MG/ML
1 INJECTION INTRAMUSCULAR; INTRAVENOUS EVERY 5 MIN PRN
Status: DISCONTINUED | OUTPATIENT
Start: 2018-04-10 | End: 2018-04-10

## 2018-04-10 RX ORDER — NALOXONE HYDROCHLORIDE 0.4 MG/ML
80 INJECTION, SOLUTION INTRAMUSCULAR; INTRAVENOUS; SUBCUTANEOUS AS NEEDED
Status: DISCONTINUED | OUTPATIENT
Start: 2018-04-10 | End: 2018-04-10

## 2018-04-10 RX ORDER — ACETAMINOPHEN 500 MG
1000 TABLET ORAL ONCE AS NEEDED
Status: DISCONTINUED | OUTPATIENT
Start: 2018-04-10 | End: 2018-04-10

## 2018-04-10 RX ORDER — HYDROCODONE BITARTRATE AND ACETAMINOPHEN 5; 325 MG/1; MG/1
2 TABLET ORAL AS NEEDED
Status: DISCONTINUED | OUTPATIENT
Start: 2018-04-10 | End: 2018-04-10

## 2018-04-10 NOTE — H&P
History & Physical Examination    Patient Name: Sapna An  MRN: [id number]  CSN: [id number]  YOB: 1950    Diagnosis: REFRACTORY FREQUENCY, URGENCY WITH INCONTINENCE    Present Illness: REFRACTORY FREQUENCY, URGENCY WITH INCONTINENCE Brother         Smoking status: Never Smoker    Smokeless tobacco: Never Used    Alcohol use Yes    Comment: social but rare       SYSTEM Check if Review is Normal Check if Physical Exam is Normal If not normal, please explain:   SHALINI Hernandez.Kenneth ] [ X]    NECK &

## 2018-04-10 NOTE — ANESTHESIA PREPROCEDURE EVALUATION
PRE-OP EVALUATION    Patient Name: Destiney Jorgensen    Pre-op Diagnosis: Retention of urine, unspecified [R33.9]  Encounter for preadmission testing [Z01.818]    Procedure(s):  PLACEMENT OF PUDENDAL INTERSTIM STAGE ONE    Surgeon(s) and Role:     * [employer] Past Surgical History:  10/2000: BREAST SURGERY PROCEDURE UNLISTED      Comment: breast surgery lumpectomy  09/21/07: COLONOSCOPY      Comment: diverticulosis, Dr. Shaniqua Ayala, Repeat in                7-10 years  2000: COLONOSCOPY  1994: [facility]

## 2018-04-10 NOTE — ANESTHESIA POSTPROCEDURE EVALUATION
[address] Patient Status:  Hospital Outpatient Surgery   Age/Gender 69year old female MRN [id number]   Location [address] [place] [address] Attending Constance Rodriguez MD   Hosp Day # 0 PCP Gio Sandoval MD

## 2018-04-10 NOTE — BRIEF OP NOTE
Pre-Operative Diagnosis: Retention of urine, unspecified [R33.9]  Encounter for preadmission testing [Z01.818]     Post-Operative Diagnosis: Retention of urine, unspecified [R33. 9]Encounter for preadmission testing [Z01.818]      Procedure Performed:   Pro

## 2018-04-16 NOTE — OPERATIVE REPORT
[facility]    PATIENT'S NAME: Hilary Bradley   ATTENDING PHYSICIAN: Alcides Dye M.D. OPERATING PHYSICIAN: Alcides Dye M.D.    PATIENT ACCOUNT#:   [de-identified]    LOCATION:  [place] [address] ED 10  MEDICAL RECORD #:   [id number]       KRISTIE which led to placement of the bidirectional wire, removal of the foramen needle, placement of the foraminal sheath, and then introduction of the quadripolar electrode.   Excellent findings were achieved with the 0, 1, 2, and 3 electrodes all having a good C

## 2018-04-23 RX ORDER — ACETAMINOPHEN 500 MG
1000 TABLET ORAL ONCE
Status: CANCELLED | OUTPATIENT
Start: 2018-04-23

## 2018-04-24 ENCOUNTER — HOSPITAL ENCOUNTER (OUTPATIENT)
Facility: HOSPITAL | Age: 68
Setting detail: HOSPITAL OUTPATIENT SURGERY
Discharge: HOME | End: 2018-04-24
Attending: UROLOGY | Admitting: UROLOGY
Payer: MEDICARE

## 2018-04-24 ENCOUNTER — SURGERY (OUTPATIENT)
Age: 68
End: 2018-04-24

## 2018-04-24 ENCOUNTER — ANESTHESIA EVENT (OUTPATIENT)
Dept: SURGERY | Facility: HOSPITAL | Age: 68
End: 2018-04-24

## 2018-04-24 ENCOUNTER — ANESTHESIA (OUTPATIENT)
Dept: SURGERY | Facility: HOSPITAL | Age: 68
End: 2018-04-24

## 2018-04-24 VITALS
RESPIRATION RATE: 16 BRPM | SYSTOLIC BLOOD PRESSURE: 123 MMHG | TEMPERATURE: 98 F | HEIGHT: 64 IN | WEIGHT: 209 LBS | OXYGEN SATURATION: 100 % | DIASTOLIC BLOOD PRESSURE: 68 MMHG | HEART RATE: 74 BPM | BODY MASS INDEX: 35.68 KG/M2

## 2018-04-24 DIAGNOSIS — R33.9 RETENTION OF URINE, UNSPECIFIED: ICD-10-CM

## 2018-04-24 PROCEDURE — 0JH70MZ INSERTION OF STIMULATOR GENERATOR INTO BACK SUBCUTANEOUS TISSUE AND FASCIA, OPEN APPROACH: ICD-10-PCS | Performed by: UROLOGY

## 2018-04-24 DEVICE — NEURO STIM IMPL THK.3IN 10-14: Type: IMPLANTABLE DEVICE | Status: FUNCTIONAL

## 2018-04-24 RX ORDER — HYDROMORPHONE HYDROCHLORIDE 1 MG/ML
0.4 INJECTION, SOLUTION INTRAMUSCULAR; INTRAVENOUS; SUBCUTANEOUS EVERY 5 MIN PRN
Status: DISCONTINUED | OUTPATIENT
Start: 2018-04-24 | End: 2018-04-24

## 2018-04-24 RX ORDER — HYDROCODONE BITARTRATE AND ACETAMINOPHEN 5; 325 MG/1; MG/1
2 TABLET ORAL AS NEEDED
Status: COMPLETED | OUTPATIENT
Start: 2018-04-24 | End: 2018-04-24

## 2018-04-24 RX ORDER — SODIUM CHLORIDE, SODIUM LACTATE, POTASSIUM CHLORIDE, CALCIUM CHLORIDE 600; 310; 30; 20 MG/100ML; MG/100ML; MG/100ML; MG/100ML
INJECTION, SOLUTION INTRAVENOUS CONTINUOUS
Status: DISCONTINUED | OUTPATIENT
Start: 2018-04-24 | End: 2018-04-24

## 2018-04-24 RX ORDER — CEFDINIR 300 MG/1
CAPSULE ORAL
Qty: 10 CAPSULE | Refills: 0 | Status: SHIPPED | OUTPATIENT
Start: 2018-04-24 | End: 2018-06-01

## 2018-04-24 RX ORDER — ACETAMINOPHEN 500 MG
1000 TABLET ORAL EVERY 6 HOURS PRN
COMMUNITY
End: 2018-06-01

## 2018-04-24 RX ORDER — CEFAZOLIN SODIUM/WATER 2 G/20 ML
2 SYRINGE (ML) INTRAVENOUS ONCE
Status: COMPLETED | OUTPATIENT
Start: 2018-04-24 | End: 2018-04-24

## 2018-04-24 RX ORDER — PROTAMINE SULFATE 10 MG/ML
INJECTION, SOLUTION INTRAVENOUS AS NEEDED
Status: DISCONTINUED | OUTPATIENT
Start: 2018-04-24 | End: 2018-04-24 | Stop reason: HOSPADM

## 2018-04-24 RX ORDER — HYDROCODONE BITARTRATE AND ACETAMINOPHEN 5; 325 MG/1; MG/1
1 TABLET ORAL AS NEEDED
Status: COMPLETED | OUTPATIENT
Start: 2018-04-24 | End: 2018-04-24

## 2018-04-24 RX ORDER — BUPIVACAINE HYDROCHLORIDE AND EPINEPHRINE 2.5; 5 MG/ML; UG/ML
INJECTION, SOLUTION EPIDURAL; INFILTRATION; INTRACAUDAL; PERINEURAL AS NEEDED
Status: DISCONTINUED | OUTPATIENT
Start: 2018-04-24 | End: 2018-04-24 | Stop reason: HOSPADM

## 2018-04-24 RX ORDER — OXYCODONE HYDROCHLORIDE AND ACETAMINOPHEN 5; 325 MG/1; MG/1
1 TABLET ORAL EVERY 6 HOURS PRN
Qty: 10 TABLET | Refills: 0 | Status: SHIPPED | OUTPATIENT
Start: 2018-04-24 | End: 2018-06-01

## 2018-04-24 RX ORDER — MEPERIDINE HYDROCHLORIDE 25 MG/ML
12.5 INJECTION INTRAMUSCULAR; INTRAVENOUS; SUBCUTANEOUS AS NEEDED
Status: DISCONTINUED | OUTPATIENT
Start: 2018-04-24 | End: 2018-04-24

## 2018-04-24 RX ORDER — MIDAZOLAM HYDROCHLORIDE 1 MG/ML
1 INJECTION INTRAMUSCULAR; INTRAVENOUS EVERY 5 MIN PRN
Status: DISCONTINUED | OUTPATIENT
Start: 2018-04-24 | End: 2018-04-24

## 2018-04-24 RX ORDER — NALOXONE HYDROCHLORIDE 0.4 MG/ML
80 INJECTION, SOLUTION INTRAMUSCULAR; INTRAVENOUS; SUBCUTANEOUS AS NEEDED
Status: DISCONTINUED | OUTPATIENT
Start: 2018-04-24 | End: 2018-04-24

## 2018-04-24 RX ORDER — ONDANSETRON 2 MG/ML
4 INJECTION INTRAMUSCULAR; INTRAVENOUS AS NEEDED
Status: DISCONTINUED | OUTPATIENT
Start: 2018-04-24 | End: 2018-04-24

## 2018-04-24 RX ORDER — DIPHENHYDRAMINE HYDROCHLORIDE 50 MG/ML
12.5 INJECTION INTRAMUSCULAR; INTRAVENOUS AS NEEDED
Status: DISCONTINUED | OUTPATIENT
Start: 2018-04-24 | End: 2018-04-24

## 2018-04-24 RX ORDER — BACITRACIN 50000 [USP'U]/1
INJECTION, POWDER, LYOPHILIZED, FOR SOLUTION INTRAMUSCULAR AS NEEDED
Status: DISCONTINUED | OUTPATIENT
Start: 2018-04-24 | End: 2018-04-24 | Stop reason: HOSPADM

## 2018-04-24 NOTE — ANESTHESIA PREPROCEDURE EVALUATION
PRE-OP EVALUATION    Patient Name: Anisha Rodriguez    Pre-op Diagnosis: Retention of urine, unspecified [R33.9]    Procedure(s):  PLACEMENT OF PUDENDAL INTERSTIM STAGE 2 OR REMOVAL OF DEVICE    Surgeon(s) and Role:     Sylvia Rendon MD - Primary of anesthetic complications         GI/Hepatic/Renal    Negative GI/hepatic/renal ROS.  (+) GERD                           Cardiovascular      ECG reviewed.   Exercise tolerance: good     MET: >4         (+) hyperlipidemia                                  E guidelines. Post-procedure pain management plan discussed with surgeon and patient. Plan/risks discussed with: patient                Options, risks and benefits of anesthesia as outlined in the anesthesia consent were reviewed with the patient.  Natanael Blount

## 2018-04-24 NOTE — BRIEF OP NOTE
Pre-Operative Diagnosis: Retention of urine, unspecified [R33.9]     Post-Operative Diagnosis: Retention of urine, unspecified [R33.9]      Procedure Performed:   Procedure(s):  PLACEMENT OF PUDENDAL INTERSTIM STAGE 2     Surgeon(s) and Role:     Ashlee Ballard,

## 2018-04-24 NOTE — ANESTHESIA POSTPROCEDURE EVALUATION
[address] Patient Status:  Hospital Outpatient Surgery   Age/Gender 69year old female MRN [id number]   [facility] SURGERY Attending Srikanth Campbell MD   Hosp Day # 0 PCP Pasha Chavez MD       Anesthesia Post-o

## 2018-04-27 NOTE — OPERATIVE REPORT
[facility]    PATIENT'S NAME: Rosmery Conti   ATTENDING PHYSICIAN: Lam Robledo M.D. OPERATING PHYSICIAN: Lam Robledo M.D.    PATIENT ACCOUNT#:   [de-identified]    LOCATION:  PREOPASCC EH PRE ASCC 2 EDWP 10  MEDICAL RECORD #:   [id number]       KRISTIE then placed into the subcutaneous pocket, and copious amounts of antibiotic irrigation was, once again, performed. The subcutaneous tissue was then approximated over the IPG device using 2-0 Vicryl suture.   A running 4-0 Monocryl suture was used to approx

## 2018-05-02 PROBLEM — K59.01 SLOW TRANSIT CONSTIPATION: Status: ACTIVE | Noted: 2018-05-02

## 2018-05-03 ENCOUNTER — APPOINTMENT (OUTPATIENT)
Dept: LAB | Age: 68
End: 2018-05-03
Attending: INTERNAL MEDICINE
Payer: MEDICARE

## 2018-05-03 ENCOUNTER — TELEPHONE (OUTPATIENT)
Dept: INTERNAL MEDICINE CLINIC | Facility: CLINIC | Age: 68
End: 2018-05-03

## 2018-05-03 DIAGNOSIS — R79.89 ELEVATED SERUM CREATININE: Primary | ICD-10-CM

## 2018-05-03 DIAGNOSIS — R79.89 ELEVATED SERUM CREATININE: ICD-10-CM

## 2018-05-03 PROCEDURE — 36415 COLL VENOUS BLD VENIPUNCTURE: CPT

## 2018-05-03 PROCEDURE — 80048 BASIC METABOLIC PNL TOTAL CA: CPT

## 2018-05-07 RX ORDER — FENOFIBRATE 160 MG/1
TABLET ORAL
Qty: 90 TABLET | Refills: 3 | Status: SHIPPED | OUTPATIENT
Start: 2018-05-07 | End: 2019-02-04

## 2018-05-22 RX ORDER — BUPROPION HYDROCHLORIDE 300 MG/1
TABLET ORAL
Qty: 90 TABLET | Refills: 2 | Status: SHIPPED | OUTPATIENT
Start: 2018-05-22 | End: 2019-02-18

## 2018-05-22 NOTE — TELEPHONE ENCOUNTER
LOV: 3/23/18 EMILY  Future office visit: 6/1/18 JL   Last labs: 5/3/18 Bmp 3/26/18 Cbc   Last RX: Bupropion 1/8/18 #30 #3 Refill  Per protocol: Route to provider

## 2018-06-01 ENCOUNTER — OFFICE VISIT (OUTPATIENT)
Dept: INTERNAL MEDICINE CLINIC | Facility: CLINIC | Age: 68
End: 2018-06-01

## 2018-06-01 VITALS
RESPIRATION RATE: 16 BRPM | BODY MASS INDEX: 33.83 KG/M2 | WEIGHT: 208 LBS | SYSTOLIC BLOOD PRESSURE: 112 MMHG | HEART RATE: 80 BPM | HEIGHT: 65.75 IN | DIASTOLIC BLOOD PRESSURE: 76 MMHG

## 2018-06-01 DIAGNOSIS — Z00.00 ROUTINE GENERAL MEDICAL EXAMINATION AT A HEALTH CARE FACILITY: Primary | ICD-10-CM

## 2018-06-01 DIAGNOSIS — E03.9 HYPOTHYROIDISM (ACQUIRED): ICD-10-CM

## 2018-06-01 DIAGNOSIS — N31.9 NEUROGENIC BLADDER: ICD-10-CM

## 2018-06-01 DIAGNOSIS — H92.02 LEFT EAR PAIN: ICD-10-CM

## 2018-06-01 DIAGNOSIS — E78.00 PURE HYPERCHOLESTEROLEMIA: ICD-10-CM

## 2018-06-01 DIAGNOSIS — F32.A DEPRESSION, UNSPECIFIED DEPRESSION TYPE: ICD-10-CM

## 2018-06-01 DIAGNOSIS — H81.12 BENIGN PAROXYSMAL POSITIONAL VERTIGO OF LEFT EAR: ICD-10-CM

## 2018-06-01 PROCEDURE — G0439 PPPS, SUBSEQ VISIT: HCPCS | Performed by: INTERNAL MEDICINE

## 2018-06-01 PROCEDURE — 99213 OFFICE O/P EST LOW 20 MIN: CPT | Performed by: INTERNAL MEDICINE

## 2018-06-01 RX ORDER — LORAZEPAM 1 MG/1
1 TABLET ORAL EVERY 8 HOURS PRN
Qty: 20 TABLET | Refills: 0 | Status: SHIPPED | OUTPATIENT
Start: 2018-06-01 | End: 2019-01-26

## 2018-06-01 NOTE — PROGRESS NOTES
HPI:    Patient ID: Brian Distance is a 69year old female.     HPI  Here for awv, co occ left ear discomfort post uri, occ am BPV symptoms transiently, no falls, urinary retention improved, anxiety and depression improving, occ gerd off ppi, due for l cerumen on right, a loose hair on left   Eyes: Pupils are equal, round, and reactive to light. Neck: Neck supple. Cardiovascular: Normal rate, regular rhythm and normal heart sounds. No murmur heard.   Pulmonary/Chest: Effort normal and breath sounds

## 2018-06-08 ENCOUNTER — APPOINTMENT (OUTPATIENT)
Dept: LAB | Age: 68
End: 2018-06-08
Attending: INTERNAL MEDICINE
Payer: MEDICARE

## 2018-06-08 DIAGNOSIS — E03.9 HYPOTHYROIDISM (ACQUIRED): ICD-10-CM

## 2018-06-08 DIAGNOSIS — E78.00 PURE HYPERCHOLESTEROLEMIA: ICD-10-CM

## 2018-06-08 PROCEDURE — 36415 COLL VENOUS BLD VENIPUNCTURE: CPT

## 2018-06-08 PROCEDURE — 80061 LIPID PANEL: CPT

## 2018-06-08 PROCEDURE — 84443 ASSAY THYROID STIM HORMONE: CPT

## 2018-09-21 RX ORDER — LEVOTHYROXINE SODIUM 0.07 MG/1
TABLET ORAL
Qty: 90 TABLET | Refills: 1 | Status: SHIPPED | OUTPATIENT
Start: 2018-09-21 | End: 2019-03-13

## 2018-10-03 RX ORDER — CITALOPRAM 40 MG/1
TABLET ORAL
Qty: 90 TABLET | Refills: 1 | Status: SHIPPED | OUTPATIENT
Start: 2018-10-03 | End: 2019-03-27

## 2018-10-03 NOTE — TELEPHONE ENCOUNTER
LOV-6/1/18 JL  FOV-NONE  LAST RX-3/26/18 90 TABS 1 REFILLS  LAST LABS-6/8/18  PER PROTOCOL-TO PROVIDER

## 2018-11-19 ENCOUNTER — HOSPITAL ENCOUNTER (OUTPATIENT)
Age: 68
Discharge: HOME | End: 2018-11-19
Attending: FAMILY MEDICINE
Payer: MEDICARE

## 2018-11-19 VITALS
BODY MASS INDEX: 35.41 KG/M2 | OXYGEN SATURATION: 98 % | WEIGHT: 210 LBS | DIASTOLIC BLOOD PRESSURE: 82 MMHG | HEIGHT: 64.5 IN | RESPIRATION RATE: 16 BRPM | TEMPERATURE: 98 F | SYSTOLIC BLOOD PRESSURE: 130 MMHG | HEART RATE: 79 BPM

## 2018-11-19 DIAGNOSIS — N30.00 ACUTE CYSTITIS WITHOUT HEMATURIA: Primary | ICD-10-CM

## 2018-11-19 PROCEDURE — 87088 URINE BACTERIA CULTURE: CPT | Performed by: FAMILY MEDICINE

## 2018-11-19 PROCEDURE — 99214 OFFICE O/P EST MOD 30 MIN: CPT

## 2018-11-19 PROCEDURE — 87186 SC STD MICRODIL/AGAR DIL: CPT | Performed by: FAMILY MEDICINE

## 2018-11-19 PROCEDURE — 87086 URINE CULTURE/COLONY COUNT: CPT | Performed by: FAMILY MEDICINE

## 2018-11-19 PROCEDURE — 81002 URINALYSIS NONAUTO W/O SCOPE: CPT | Performed by: FAMILY MEDICINE

## 2018-11-19 RX ORDER — NITROFURANTOIN 25; 75 MG/1; MG/1
100 CAPSULE ORAL 2 TIMES DAILY
Qty: 14 CAPSULE | Refills: 0 | Status: SHIPPED | OUTPATIENT
Start: 2018-11-19 | End: 2018-11-26

## 2018-11-19 NOTE — ED PROVIDER NOTES
Patient Seen in: [address] [facility]    History   Patient presents with:  Urinary Symptoms (urologic)    Stated Complaint: CLOUDY URINE    HPI    25-year-old female with a history of neurogenic bladder presents to the clinic today with 8-10-d BIOPSY RIGHT  2001   • OTHER      left elbow   • OTHER  2/8/17    Interstim stage I   • OTHER SURGICAL HISTORY  04/24/2018    stage 2 pudendal interstim dr Trujillo Section history reviewed and is not pertinent to presenting problem.     Social History components:       Result Value    Urine Clarity Cloudy (*)     Blood, Urine Trace-Intact (*)     Leukocyte esterase urine Small (*)     All other components within normal limits   URINE CULTURE, ROUTINE         MDM     UA positive for small white blood carmita

## 2018-11-19 NOTE — ED INITIAL ASSESSMENT (HPI)
x8-10 days Pt c/o fatigue, urgency with urination, + odor, Pt self caths d/t retention. Denies N/V, fever or chills.

## 2019-01-16 ENCOUNTER — PATIENT MESSAGE (OUTPATIENT)
Dept: INTERNAL MEDICINE CLINIC | Facility: CLINIC | Age: 69
End: 2019-01-16

## 2019-01-16 DIAGNOSIS — Z12.31 ENCOUNTER FOR SCREENING MAMMOGRAM FOR BREAST CANCER: Primary | ICD-10-CM

## 2019-01-16 NOTE — TELEPHONE ENCOUNTER
Last mammogram completed on 01/11/2018, mammogram order pended for your approval if ok. Please advise.

## 2019-01-16 NOTE — TELEPHONE ENCOUNTER
From: Pancho Argueta  To: Kori Wilkins MD  Sent: 1/16/2019 9:56 AM CST  Subject: Referral Request    It’s time for my annual mammogram. Would you please put a referral in the system so I can schedule it? Thank you.

## 2019-01-23 ENCOUNTER — TELEPHONE (OUTPATIENT)
Dept: INTERNAL MEDICINE CLINIC | Facility: CLINIC | Age: 69
End: 2019-01-23

## 2019-01-23 NOTE — TELEPHONE ENCOUNTER
[facility]   Future Appointments   Date Time Provider [facility]   6/5/2019  1:00 PM Governor Lluvia MD [facility]     Orders to edward  aware must fast no call back required

## 2019-01-31 ENCOUNTER — HOSPITAL ENCOUNTER (OUTPATIENT)
Dept: MAMMOGRAPHY | Age: 69
Discharge: HOME | End: 2019-01-31
Attending: INTERNAL MEDICINE
Payer: MEDICARE

## 2019-01-31 DIAGNOSIS — Z12.31 ENCOUNTER FOR SCREENING MAMMOGRAM FOR BREAST CANCER: ICD-10-CM

## 2019-01-31 PROCEDURE — 77067 SCR MAMMO BI INCL CAD: CPT | Performed by: INTERNAL MEDICINE

## 2019-02-04 RX ORDER — FENOFIBRATE 160 MG/1
TABLET ORAL
Qty: 90 TABLET | Refills: 0 | Status: SHIPPED | OUTPATIENT
Start: 2019-02-04 | End: 2019-08-13

## 2019-02-08 RX ORDER — LORAZEPAM 1 MG/1
1 TABLET ORAL EVERY 8 HOURS PRN
Qty: 20 TABLET | Refills: 0 | Status: SHIPPED | OUTPATIENT
Start: 2019-02-08 | End: 2020-01-29

## 2019-02-08 NOTE — PROGRESS NOTES
Satish Plummer is a 77year old female. Patient presents with:   Follow - Up: Room 7 TO pt here for 6 month f/u      HPI:   Here for 6 mo f/u and refill of PRN lorazepam.    Dyslipidemia  Stable   Fenofibrate    hypothryoid  Levothyroxine  Stable  Due Osteoarthritis     generalized   • Problems with swallowing    • Thyroid disease    • Urinary retention     surgery scheduled 02/08/17   • Visual impairment     glasses   • Wears glasses       Social History:  Social History    Tobacco Use      Smoking sta normal strength and tone  PSYCH: alert and oriented x 3    ASSESSMENT AND PLAN:     Anxiety state  (primary encounter diagnosis)  Refill lorazepam.  Cont wellbutrin and citalopram.    Pure hypercholesterolemia  Stable  Due for labs in Summer.    Fenofibrate

## 2019-02-11 ENCOUNTER — HOSPITAL ENCOUNTER (OUTPATIENT)
Dept: MAMMOGRAPHY | Age: 69
Discharge: HOME | End: 2019-02-11
Attending: INTERNAL MEDICINE
Payer: MEDICARE

## 2019-02-11 DIAGNOSIS — R92.2 INCONCLUSIVE MAMMOGRAM: ICD-10-CM

## 2019-02-11 PROCEDURE — 77065 DX MAMMO INCL CAD UNI: CPT | Performed by: INTERNAL MEDICINE

## 2019-02-12 DIAGNOSIS — R92.1 BREAST CALCIFICATIONS: Primary | ICD-10-CM

## 2019-02-18 RX ORDER — BUPROPION HYDROCHLORIDE 300 MG/1
TABLET ORAL
Qty: 90 TABLET | Refills: 2 | Status: SHIPPED | OUTPATIENT
Start: 2019-02-18 | End: 2019-11-15

## 2019-02-18 NOTE — TELEPHONE ENCOUNTER
LFV: 2/8/19 with SD  Future Appt: 6/5/19   Last Rx: 5/22/2018 90 days w/2 refills  Last Labs: 6/8/18 lipid, tsh and bmp  Per protocol to provider

## 2019-03-13 RX ORDER — LEVOTHYROXINE SODIUM 0.07 MG/1
TABLET ORAL
Qty: 90 TABLET | Refills: 1 | Status: SHIPPED | OUTPATIENT
Start: 2019-03-13 | End: 2019-09-10

## 2019-03-27 RX ORDER — CITALOPRAM 40 MG/1
TABLET ORAL
Qty: 90 TABLET | Refills: 1 | Status: SHIPPED | OUTPATIENT
Start: 2019-03-27 | End: 2019-09-29

## 2019-03-27 NOTE — TELEPHONE ENCOUNTER
LOV: 2/8/19 w/ SD for anxiety, 6/1/18 w/ JL for CPE  FOV: 6/5/19 w/ JL for MWV  Last labs: 1/14/19 U/A  Last Refill: 10/3/18 qt:90 1 refill    Per protocol routed to provider

## 2019-05-29 ENCOUNTER — LAB ENCOUNTER (OUTPATIENT)
Dept: LAB | Age: 69
End: 2019-05-29
Attending: NURSE PRACTITIONER
Payer: MEDICARE

## 2019-05-29 DIAGNOSIS — F41.1 ANXIETY STATE: ICD-10-CM

## 2019-05-29 DIAGNOSIS — E03.9 HYPOTHYROIDISM (ACQUIRED): ICD-10-CM

## 2019-05-29 DIAGNOSIS — E78.00 PURE HYPERCHOLESTEROLEMIA: ICD-10-CM

## 2019-05-29 DIAGNOSIS — K59.01 SLOW TRANSIT CONSTIPATION: ICD-10-CM

## 2019-05-29 DIAGNOSIS — F32.A DEPRESSION, UNSPECIFIED DEPRESSION TYPE: ICD-10-CM

## 2019-05-29 PROCEDURE — 84439 ASSAY OF FREE THYROXINE: CPT

## 2019-05-29 PROCEDURE — 80053 COMPREHEN METABOLIC PANEL: CPT

## 2019-05-29 PROCEDURE — 84443 ASSAY THYROID STIM HORMONE: CPT

## 2019-05-29 PROCEDURE — 80061 LIPID PANEL: CPT

## 2019-05-29 PROCEDURE — 85025 COMPLETE CBC W/AUTO DIFF WBC: CPT

## 2019-06-05 ENCOUNTER — OFFICE VISIT (OUTPATIENT)
Dept: INTERNAL MEDICINE CLINIC | Facility: CLINIC | Age: 69
End: 2019-06-05
Payer: MEDICARE

## 2019-06-05 ENCOUNTER — APPOINTMENT (OUTPATIENT)
Dept: LAB | Age: 69
End: 2019-06-05
Attending: INTERNAL MEDICINE
Payer: MEDICARE

## 2019-06-05 ENCOUNTER — TELEPHONE (OUTPATIENT)
Dept: INTERNAL MEDICINE CLINIC | Facility: CLINIC | Age: 69
End: 2019-06-05

## 2019-06-05 VITALS
SYSTOLIC BLOOD PRESSURE: 112 MMHG | RESPIRATION RATE: 16 BRPM | BODY MASS INDEX: 34.24 KG/M2 | HEIGHT: 64.57 IN | WEIGHT: 203 LBS | TEMPERATURE: 98 F | DIASTOLIC BLOOD PRESSURE: 86 MMHG | HEART RATE: 68 BPM

## 2019-06-05 DIAGNOSIS — E78.00 PURE HYPERCHOLESTEROLEMIA: ICD-10-CM

## 2019-06-05 DIAGNOSIS — R41.3 MEMORY CHANGE: ICD-10-CM

## 2019-06-05 DIAGNOSIS — Z00.00 ROUTINE GENERAL MEDICAL EXAMINATION AT A HEALTH CARE FACILITY: ICD-10-CM

## 2019-06-05 DIAGNOSIS — F32.A DEPRESSION, UNSPECIFIED DEPRESSION TYPE: ICD-10-CM

## 2019-06-05 DIAGNOSIS — Z78.0 POST-MENOPAUSAL: Primary | ICD-10-CM

## 2019-06-05 DIAGNOSIS — E03.9 HYPOTHYROIDISM (ACQUIRED): ICD-10-CM

## 2019-06-05 PROCEDURE — G0439 PPPS, SUBSEQ VISIT: HCPCS | Performed by: INTERNAL MEDICINE

## 2019-06-05 PROCEDURE — 99213 OFFICE O/P EST LOW 20 MIN: CPT | Performed by: INTERNAL MEDICINE

## 2019-06-05 PROCEDURE — 82607 VITAMIN B-12: CPT

## 2019-06-05 NOTE — TELEPHONE ENCOUNTER
LL messaged via [email or web address] indicating JL was inquiring when pt had last colonoscopy. Contacted GI and spoke with Rylan Flores @Dr. Quique Will office indicating pt's last colonoscopy on 6/12/18. Will fax report to our office [phone]. Hold for fax.   Discussed w

## 2019-06-05 NOTE — TELEPHONE ENCOUNTER
Colonoscopy record is in [address] with date 6/12/18 however do not see recommendation for repeat testing. Health Maint had pt excluded from colonoscopy - that has been removed. Await pathology for recommended f/up found with diverticulosis.

## 2019-06-10 NOTE — PROGRESS NOTES
HPI:    Patient ID: Pancho Argueta is a 77year old female.     HPI  Here for awv, please see awv form for details, depression controlled, complains of occ fatigue, labs reviewed  /86 (BP Location: Right arm, Patient Position: Sitting, Cuff Size: oropharyngeal exudate. Eyes: Pupils are equal, round, and reactive to light. Right eye exhibits no discharge. Neck: Neck supple. Cardiovascular: Normal rate, regular rhythm and normal heart sounds. No murmur heard.   Pulmonary/Chest: Effort normal a

## 2019-06-26 ENCOUNTER — HOSPITAL ENCOUNTER (OUTPATIENT)
Age: 69
Discharge: HOME | End: 2019-06-26
Attending: FAMILY MEDICINE
Payer: MEDICARE

## 2019-06-26 VITALS
SYSTOLIC BLOOD PRESSURE: 131 MMHG | TEMPERATURE: 98 F | RESPIRATION RATE: 16 BRPM | BODY MASS INDEX: 34.57 KG/M2 | DIASTOLIC BLOOD PRESSURE: 83 MMHG | HEIGHT: 64.5 IN | WEIGHT: 205 LBS | OXYGEN SATURATION: 96 % | HEART RATE: 79 BPM

## 2019-06-26 DIAGNOSIS — N30.01 ACUTE CYSTITIS WITH HEMATURIA: Primary | ICD-10-CM

## 2019-06-26 PROCEDURE — 87086 URINE CULTURE/COLONY COUNT: CPT | Performed by: FAMILY MEDICINE

## 2019-06-26 PROCEDURE — 87186 SC STD MICRODIL/AGAR DIL: CPT | Performed by: FAMILY MEDICINE

## 2019-06-26 PROCEDURE — 99214 OFFICE O/P EST MOD 30 MIN: CPT

## 2019-06-26 PROCEDURE — 81002 URINALYSIS NONAUTO W/O SCOPE: CPT | Performed by: FAMILY MEDICINE

## 2019-06-26 PROCEDURE — 87077 CULTURE AEROBIC IDENTIFY: CPT | Performed by: FAMILY MEDICINE

## 2019-06-26 RX ORDER — NITROFURANTOIN 25; 75 MG/1; MG/1
100 CAPSULE ORAL 2 TIMES DAILY
Qty: 14 CAPSULE | Refills: 0 | Status: SHIPPED | OUTPATIENT
Start: 2019-06-26 | End: 2019-07-03

## 2019-06-26 NOTE — ED PROVIDER NOTES
Patient Seen in: [address] [facility] [place]    History   Patient presents with:  Urinary Symptoms (urologic)    Stated Complaint: uti    HPI    This is a 33-year-old female coming in with complaints of urinary symptoms.   She self catheterizes and ha dcis   • NEEDLE BIOPSY RIGHT  2001   • OTHER      left elbow   • OTHER  2/8/17    Interstim stage I   • OTHER SURGICAL HISTORY  04/24/2018    stage 2 pudendal interstim dr Antonia Gonsales history reviewed and is not pertinent to presenting problem.     Vitor Campos capsule (100 mg total) by mouth 2 (two) times daily for 7 days. Dispense:  14 capsule          Refill:  0    Patient verbalized understanding and agreed with the plan. MDM       Drink at least 96 ounces of water daily. Limit caffeine intake.

## 2019-07-11 ENCOUNTER — TELEPHONE (OUTPATIENT)
Dept: INTERNAL MEDICINE CLINIC | Facility: CLINIC | Age: 69
End: 2019-07-11

## 2019-07-11 DIAGNOSIS — R92.1 BREAST CALCIFICATION, LEFT: Primary | ICD-10-CM

## 2019-07-11 NOTE — TELEPHONE ENCOUNTER
6mo F/U Mammogram order needs to be changed to Diagnostic Unilateral LT Side, US if indicated     Please advise

## 2019-07-11 NOTE — TELEPHONE ENCOUNTER
Pended new order for Dx Left Breast Mammogram, notes indicating 6 month f/u mammogram for left breast calcifications and signed per protocol.

## 2019-08-02 ENCOUNTER — HOSPITAL ENCOUNTER (OUTPATIENT)
Dept: BONE DENSITY | Age: 69
Discharge: HOME | End: 2019-08-02
Attending: INTERNAL MEDICINE
Payer: MEDICARE

## 2019-08-02 DIAGNOSIS — Z78.0 POST-MENOPAUSAL: ICD-10-CM

## 2019-08-02 PROCEDURE — 77080 DXA BONE DENSITY AXIAL: CPT | Performed by: INTERNAL MEDICINE

## 2019-08-13 RX ORDER — FENOFIBRATE 160 MG/1
TABLET ORAL
Qty: 90 TABLET | Refills: 0 | Status: SHIPPED | OUTPATIENT
Start: 2019-08-13 | End: 2019-11-12

## 2019-08-29 ENCOUNTER — PATIENT MESSAGE (OUTPATIENT)
Dept: INTERNAL MEDICINE CLINIC | Facility: CLINIC | Age: 69
End: 2019-08-29

## 2019-08-29 ENCOUNTER — HOSPITAL ENCOUNTER (OUTPATIENT)
Dept: MAMMOGRAPHY | Age: 69
Discharge: HOME | End: 2019-08-29
Attending: INTERNAL MEDICINE
Payer: MEDICARE

## 2019-08-29 DIAGNOSIS — R92.1 BREAST CALCIFICATION, LEFT: ICD-10-CM

## 2019-08-29 DIAGNOSIS — L98.9 SKIN LESION: Primary | ICD-10-CM

## 2019-08-29 PROCEDURE — 77065 DX MAMMO INCL CAD UNI: CPT | Performed by: INTERNAL MEDICINE

## 2019-08-29 PROCEDURE — 77061 BREAST TOMOSYNTHESIS UNI: CPT | Performed by: INTERNAL MEDICINE

## 2019-08-29 NOTE — TELEPHONE ENCOUNTER
From: Emma Trujillo  To:  Adelso Valdivia MD  Sent: 8/29/2019 2:14 PM CDT  Subject: Referral Request    Can you please suggest a couple Dermatologists that I could go to?

## 2019-09-09 ENCOUNTER — TELEPHONE (OUTPATIENT)
Dept: INTERNAL MEDICINE CLINIC | Facility: CLINIC | Age: 69
End: 2019-09-09

## 2019-09-09 NOTE — TELEPHONE ENCOUNTER
Pre-op for surgery with Dr. Rosendo Soares on 10/11/19     Future Appointments   Date Time Provider Ruben Valarie   9/30/2019  4:00 PM Moreno Moore MD [facility]   10/11/2019  9:15 AM MD Fatoumata Rosenbaum [facility] [phone] fax

## 2019-09-10 RX ORDER — LEVOTHYROXINE SODIUM 0.07 MG/1
TABLET ORAL
Qty: 90 TABLET | Refills: 1 | Status: SHIPPED | OUTPATIENT
Start: 2019-09-10 | End: 2020-03-04

## 2019-09-10 NOTE — TELEPHONE ENCOUNTER
Last Ov: 6/5/19, JL, physical  Upcoming appt: 9/30/19  Last labs: Vit B12 6/5/19  Last Rx: levothyroxine 75mcg, #90, 1R 3/13/19    Per Protocol, passed. Refill sent.

## 2019-09-17 ENCOUNTER — HOSPITAL ENCOUNTER (OUTPATIENT)
Age: 69
Discharge: HOME | End: 2019-09-17
Attending: FAMILY MEDICINE
Payer: MEDICARE

## 2019-09-17 VITALS
HEART RATE: 77 BPM | WEIGHT: 205 LBS | BODY MASS INDEX: 35 KG/M2 | RESPIRATION RATE: 16 BRPM | SYSTOLIC BLOOD PRESSURE: 124 MMHG | TEMPERATURE: 98 F | DIASTOLIC BLOOD PRESSURE: 81 MMHG | OXYGEN SATURATION: 97 %

## 2019-09-17 DIAGNOSIS — N30.00 ACUTE CYSTITIS WITHOUT HEMATURIA: Primary | ICD-10-CM

## 2019-09-17 LAB
POCT BILIRUBIN URINE: NEGATIVE
POCT BLOOD URINE: NEGATIVE
POCT GLUCOSE URINE: NEGATIVE MG/DL
POCT KETONE URINE: NEGATIVE MG/DL
POCT NITRITE URINE: POSITIVE
POCT PH URINE: 5.5 (ref 5–8)
POCT PROTEIN URINE: NEGATIVE MG/DL
POCT SPECIFIC GRAVITY URINE: 1.02
POCT URINE COLOR: YELLOW
POCT UROBILINOGEN URINE: 0.2 MG/DL

## 2019-09-17 PROCEDURE — 87088 URINE BACTERIA CULTURE: CPT | Performed by: FAMILY MEDICINE

## 2019-09-17 PROCEDURE — 87186 SC STD MICRODIL/AGAR DIL: CPT | Performed by: FAMILY MEDICINE

## 2019-09-17 PROCEDURE — 99214 OFFICE O/P EST MOD 30 MIN: CPT

## 2019-09-17 PROCEDURE — 87086 URINE CULTURE/COLONY COUNT: CPT | Performed by: FAMILY MEDICINE

## 2019-09-17 PROCEDURE — 81002 URINALYSIS NONAUTO W/O SCOPE: CPT | Performed by: FAMILY MEDICINE

## 2019-09-17 RX ORDER — NITROFURANTOIN 25; 75 MG/1; MG/1
100 CAPSULE ORAL 2 TIMES DAILY
Qty: 14 CAPSULE | Refills: 0 | Status: SHIPPED | OUTPATIENT
Start: 2019-09-17 | End: 2019-09-24

## 2019-09-17 NOTE — ED INITIAL ASSESSMENT (HPI)
Pressure in bladder and headache for about 10 days. Has history of neurogenic bladder and urine retention.  Denies fever, chills or abdominal.

## 2019-09-17 NOTE — ED PROVIDER NOTES
Patient Seen in: [address] [facility] [place]      History   Patient presents with:  Urinary Symptoms (urologic)    Stated Complaint: uti    HPI  70-year-old female with a history of neurogenic bladder and urinary retention presents to the immediate c OTHER  2/8/17    Interstim stage I   • OTHER SURGICAL HISTORY  04/24/2018    stage 2 pudendal interstim dr Rob Nicholas history reviewed with patient/caregiver and is not pertinent to presenting problem.     Social History    Tobacco Use components:       Result Value    Urine Clarity Cloudy (*)     Nitrite Urine Positive (*)     Leukocyte esterase urine Small (*)     All other components within normal limits   URINE CULTURE, ROUTINE                  MDM     UA positive for small white blo

## 2019-09-23 NOTE — TELEPHONE ENCOUNTER
I called pt today to let her know we have not received our form back that we faxed to Dr. Viji Villarreal 3 times and she will call their office to have them fax it to us

## 2019-09-30 ENCOUNTER — OFFICE VISIT (OUTPATIENT)
Dept: INTERNAL MEDICINE CLINIC | Facility: CLINIC | Age: 69
End: 2019-09-30
Payer: MEDICARE

## 2019-09-30 VITALS
TEMPERATURE: 99 F | DIASTOLIC BLOOD PRESSURE: 64 MMHG | BODY MASS INDEX: 34.03 KG/M2 | RESPIRATION RATE: 16 BRPM | HEART RATE: 82 BPM | SYSTOLIC BLOOD PRESSURE: 118 MMHG | WEIGHT: 201.81 LBS | HEIGHT: 64.57 IN | OXYGEN SATURATION: 97 %

## 2019-09-30 DIAGNOSIS — R94.31 ABNORMAL EKG: ICD-10-CM

## 2019-09-30 DIAGNOSIS — E78.00 PURE HYPERCHOLESTEROLEMIA: Primary | ICD-10-CM

## 2019-09-30 DIAGNOSIS — Z01.818 PRE-OP EVALUATION: ICD-10-CM

## 2019-09-30 DIAGNOSIS — N31.9 NEUROGENIC BLADDER: ICD-10-CM

## 2019-09-30 DIAGNOSIS — F41.1 ANXIETY STATE: ICD-10-CM

## 2019-09-30 DIAGNOSIS — E03.9 HYPOTHYROIDISM (ACQUIRED): ICD-10-CM

## 2019-09-30 PROCEDURE — 99214 OFFICE O/P EST MOD 30 MIN: CPT | Performed by: INTERNAL MEDICINE

## 2019-09-30 PROCEDURE — 90662 IIV NO PRSV INCREASED AG IM: CPT | Performed by: INTERNAL MEDICINE

## 2019-09-30 PROCEDURE — 93000 ELECTROCARDIOGRAM COMPLETE: CPT | Performed by: INTERNAL MEDICINE

## 2019-09-30 PROCEDURE — G0008 ADMIN INFLUENZA VIRUS VAC: HCPCS | Performed by: INTERNAL MEDICINE

## 2019-09-30 RX ORDER — CITALOPRAM 40 MG/1
TABLET ORAL
Qty: 90 TABLET | Refills: 1 | Status: SHIPPED | OUTPATIENT
Start: 2019-09-30 | End: 2020-03-16

## 2019-09-30 NOTE — TELEPHONE ENCOUNTER
LOV:6/5/19 EMILY  FOV:none on file   LAST RX:3/27/19 40 mg take 1 tab daily 90 tabs 1 refill   LAST LABS: 9/17/19 urine culture,poct urinalysis  PER PROTOCOL: to provider

## 2019-10-02 ENCOUNTER — LAB ENCOUNTER (OUTPATIENT)
Dept: LAB | Age: 69
End: 2019-10-02
Attending: INTERNAL MEDICINE
Payer: MEDICARE

## 2019-10-02 DIAGNOSIS — E78.00 PURE HYPERCHOLESTEROLEMIA: ICD-10-CM

## 2019-10-02 PROCEDURE — 80053 COMPREHEN METABOLIC PANEL: CPT

## 2019-10-02 PROCEDURE — 85025 COMPLETE CBC W/AUTO DIFF WBC: CPT

## 2019-10-02 NOTE — PROGRESS NOTES
HPI:    Patient ID: Nathan Nathan is a 77year old female.     HPI  Pt here for pre op risk evaluation prior to surgery at the request of Dr. Viji Villarreal, having bladder stimulator removed, no cp or sob, no dysuria, depression controlled on meds, hypothyroi Right Ear: External ear normal.   Mouth/Throat: No oropharyngeal exudate. Eyes: Pupils are equal, round, and reactive to light. Right eye exhibits no discharge. Neck: Neck supple. Cardiovascular: Normal rate, regular rhythm and normal heart sounds.

## 2019-10-07 DIAGNOSIS — D64.9 ANEMIA, UNSPECIFIED TYPE: Primary | ICD-10-CM

## 2019-10-08 ENCOUNTER — HOSPITAL ENCOUNTER (OUTPATIENT)
Dept: CV DIAGNOSTICS | Age: 69
Discharge: HOME | End: 2019-10-08
Attending: INTERNAL MEDICINE
Payer: MEDICARE

## 2019-10-08 DIAGNOSIS — R94.31 ABNORMAL EKG: ICD-10-CM

## 2019-10-08 PROCEDURE — 93017 CV STRESS TEST TRACING ONLY: CPT | Performed by: INTERNAL MEDICINE

## 2019-10-08 PROCEDURE — 93350 STRESS TTE ONLY: CPT | Performed by: INTERNAL MEDICINE

## 2019-10-08 PROCEDURE — 93018 CV STRESS TEST I&R ONLY: CPT | Performed by: INTERNAL MEDICINE

## 2019-10-17 ENCOUNTER — LAB ENCOUNTER (OUTPATIENT)
Dept: LAB | Age: 69
End: 2019-10-17
Attending: INTERNAL MEDICINE
Payer: MEDICARE

## 2019-10-17 DIAGNOSIS — D64.9 ANEMIA, UNSPECIFIED TYPE: ICD-10-CM

## 2019-10-17 PROCEDURE — 83550 IRON BINDING TEST: CPT

## 2019-10-17 PROCEDURE — 85025 COMPLETE CBC W/AUTO DIFF WBC: CPT

## 2019-10-17 PROCEDURE — 82728 ASSAY OF FERRITIN: CPT

## 2019-10-17 PROCEDURE — 83540 ASSAY OF IRON: CPT

## 2019-10-21 ENCOUNTER — TELEPHONE (OUTPATIENT)
Dept: INTERNAL MEDICINE CLINIC | Facility: CLINIC | Age: 69
End: 2019-10-21

## 2019-11-12 RX ORDER — FENOFIBRATE 160 MG/1
TABLET ORAL
Qty: 90 TABLET | Refills: 0 | Status: SHIPPED | OUTPATIENT
Start: 2019-11-12 | End: 2020-02-12

## 2019-11-12 NOTE — TELEPHONE ENCOUNTER
Last ov:  uro 1/16/19                  JL  9/30/19    Last physical:  6/5/19    Last refill: fenofibrate 160mg  8/13/19    Quantity:90, 0 refills    Related labs:  10/17/19: ferritin, iron,cbc; 10/2 cmp    Future appointments: none scheduled    Protocol: p

## 2019-11-15 RX ORDER — BUPROPION HYDROCHLORIDE 300 MG/1
300 TABLET ORAL DAILY
Qty: 90 TABLET | Refills: 1 | Status: SHIPPED | OUTPATIENT
Start: 2019-11-15 | End: 2020-02-12

## 2019-11-15 NOTE — TELEPHONE ENCOUNTER
Last Office Visit: 9-30-19 with EMILY for pre-op  Last Rx Filled: 2-18-19 90 tabs with 2 refills   Last Labs: 10-17-19 cbc/iron/tibc/ferritin.  10-2-19 cbc/cmp  Future Appointment: none    Per protocol to provider

## 2019-12-30 ENCOUNTER — HOSPITAL ENCOUNTER (OUTPATIENT)
Age: 69
Discharge: HOME | End: 2019-12-30
Payer: MEDICARE

## 2019-12-30 VITALS
OXYGEN SATURATION: 99 % | HEART RATE: 90 BPM | RESPIRATION RATE: 16 BRPM | TEMPERATURE: 98 F | SYSTOLIC BLOOD PRESSURE: 153 MMHG | DIASTOLIC BLOOD PRESSURE: 86 MMHG

## 2019-12-30 DIAGNOSIS — N30.90 CYSTITIS: Primary | ICD-10-CM

## 2019-12-30 DIAGNOSIS — J40 BRONCHITIS: ICD-10-CM

## 2019-12-30 LAB
POCT BILIRUBIN URINE: NEGATIVE
POCT GLUCOSE URINE: NEGATIVE MG/DL
POCT KETONE URINE: NEGATIVE MG/DL
POCT NITRITE URINE: POSITIVE
POCT PH URINE: 6 (ref 5–8)
POCT PROTEIN URINE: NEGATIVE MG/DL
POCT SPECIFIC GRAVITY URINE: 1.01
POCT URINE COLOR: YELLOW
POCT UROBILINOGEN URINE: 0.2 MG/DL

## 2019-12-30 PROCEDURE — 87088 URINE BACTERIA CULTURE: CPT | Performed by: PHYSICIAN ASSISTANT

## 2019-12-30 PROCEDURE — 99214 OFFICE O/P EST MOD 30 MIN: CPT

## 2019-12-30 PROCEDURE — 87186 SC STD MICRODIL/AGAR DIL: CPT | Performed by: PHYSICIAN ASSISTANT

## 2019-12-30 PROCEDURE — 87086 URINE CULTURE/COLONY COUNT: CPT | Performed by: PHYSICIAN ASSISTANT

## 2019-12-30 PROCEDURE — 81002 URINALYSIS NONAUTO W/O SCOPE: CPT | Performed by: FAMILY MEDICINE

## 2019-12-30 RX ORDER — METHYLPREDNISOLONE 4 MG/1
TABLET ORAL
Qty: 1 PACKAGE | Refills: 0 | Status: SHIPPED | OUTPATIENT
Start: 2019-12-30 | End: 2020-02-12 | Stop reason: ALTCHOICE

## 2019-12-30 RX ORDER — BENZONATATE 100 MG/1
100 CAPSULE ORAL 3 TIMES DAILY PRN
Qty: 30 CAPSULE | Refills: 0 | Status: SHIPPED | OUTPATIENT
Start: 2019-12-30 | End: 2020-01-29

## 2019-12-30 RX ORDER — CEFDINIR 300 MG/1
300 CAPSULE ORAL 2 TIMES DAILY
Qty: 20 CAPSULE | Refills: 0 | Status: SHIPPED | OUTPATIENT
Start: 2019-12-30 | End: 2020-01-09

## 2019-12-30 NOTE — ED PROVIDER NOTES
Patient Seen in: [address] [facility]      History   Patient presents with:  Cough/URI  UTI    Stated Complaint: coughing and possible UTI    HPI    Patient is a pleasant 68-year-old female.   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 Neuro: Cranial nerves intact, Normal Gait.     ED Course     Labs Reviewed   POCT URINALYSIS DIPSTICK - Abnormal; Notable for the following components:       Result Value    Urine Clarity Slightly cloudy (*)     Jessi, Torito Randall

## 2019-12-30 NOTE — ED INITIAL ASSESSMENT (HPI)
Pt reports cough for 2 weeks, not getting any better with muccinex. States it is dry, no  Shortness of breath. Pt has not been able to sleep due to cough. Also hx of UTI's, pt self caths.  And feels she has one today due to odor of urine

## 2020-01-29 ENCOUNTER — PATIENT MESSAGE (OUTPATIENT)
Dept: INTERNAL MEDICINE CLINIC | Facility: CLINIC | Age: 70
End: 2020-01-29

## 2020-01-29 RX ORDER — LORAZEPAM 1 MG/1
1 TABLET ORAL EVERY 8 HOURS PRN
Qty: 20 TABLET | Refills: 0 | Status: SHIPPED | OUTPATIENT
Start: 2020-01-29 | End: 2020-01-31

## 2020-01-29 NOTE — TELEPHONE ENCOUNTER
Last Ov:9/30/19  Upcoming appt:  Last labs:2/17/19 CBC with dif, iron,ferritin  Last Rx:2/8/29    Per Protocol  Sent for review

## 2020-01-30 NOTE — TELEPHONE ENCOUNTER
From: Emma Trujillo  To: Flako Forbes MD  Sent: 1/29/2020 6:06 PM CST  Subject: Prescription Question    Today you sent my renewal prescription to the wrong pharmacy. You should’ve sent it to [facility] on [address] in [place] [address].  However you

## 2020-01-31 RX ORDER — LORAZEPAM 1 MG/1
1 TABLET ORAL EVERY 8 HOURS PRN
Qty: 20 TABLET | Refills: 0 | Status: SHIPPED | OUTPATIENT
Start: 2020-01-31 | End: 2021-03-12

## 2020-02-12 ENCOUNTER — OFFICE VISIT (OUTPATIENT)
Dept: INTERNAL MEDICINE CLINIC | Facility: CLINIC | Age: 70
End: 2020-02-12
Payer: MEDICARE

## 2020-02-12 VITALS
BODY MASS INDEX: 32.61 KG/M2 | RESPIRATION RATE: 18 BRPM | SYSTOLIC BLOOD PRESSURE: 118 MMHG | HEART RATE: 76 BPM | DIASTOLIC BLOOD PRESSURE: 78 MMHG | WEIGHT: 191 LBS | HEIGHT: 64 IN | OXYGEN SATURATION: 99 % | TEMPERATURE: 98 F

## 2020-02-12 DIAGNOSIS — R05.9 COUGH: ICD-10-CM

## 2020-02-12 DIAGNOSIS — R09.81 SINUS CONGESTION: Primary | ICD-10-CM

## 2020-02-12 PROCEDURE — 99214 OFFICE O/P EST MOD 30 MIN: CPT | Performed by: NURSE PRACTITIONER

## 2020-02-12 RX ORDER — PREDNISONE 20 MG/1
TABLET ORAL
Qty: 15 TABLET | Refills: 0 | Status: SHIPPED | OUTPATIENT
Start: 2020-02-12 | End: 2020-02-22

## 2020-02-12 RX ORDER — FENOFIBRATE 160 MG/1
160 TABLET ORAL
Qty: 90 TABLET | Refills: 0 | Status: SHIPPED | OUTPATIENT
Start: 2020-02-12 | End: 2020-05-12

## 2020-02-12 RX ORDER — BUPROPION HYDROCHLORIDE 300 MG/1
300 TABLET ORAL DAILY
Qty: 90 TABLET | Refills: 1 | Status: SHIPPED | OUTPATIENT
Start: 2020-02-12 | End: 2020-06-08

## 2020-02-12 RX ORDER — AMOXICILLIN AND CLAVULANATE POTASSIUM 875; 125 MG/1; MG/1
1 TABLET, FILM COATED ORAL 2 TIMES DAILY
Qty: 20 TABLET | Refills: 0 | Status: SHIPPED | OUTPATIENT
Start: 2020-02-12 | End: 2020-02-22

## 2020-02-12 RX ORDER — FLUTICASONE PROPIONATE 50 MCG
1 SPRAY, SUSPENSION (ML) NASAL 2 TIMES DAILY
Qty: 1 BOTTLE | Refills: 3 | Status: SHIPPED | OUTPATIENT
Start: 2020-02-12 | End: 2020-06-08 | Stop reason: ALTCHOICE

## 2020-02-12 NOTE — PROGRESS NOTES
Hilton Ruiz is a 68year old female. Patient presents with:  Cough: mucus- yellow, mild fever, congestion, mid left back discomfort--x1 wk      HPI:   Presents for eval of cough and congestion. Started with congestion before azlu.   Treated a TABLET BY MOUTH ONCE DAILY 90 tablet 1   • LEVOTHYROXINE SODIUM 75 MCG Oral Tab TAKE 1 TABLET BY MOUTH ONCE DAILY 90 tablet 1   • aspirin 325 MG Oral Tab Take 1 tablet (325 mg total) by mouth daily.  30 tablet 0      Past Medical History:   Diagnosis Date no diarrhea or constipation  MUSCULOSKELETAL:  myalgias  NEURO: headaches,     EXAM:   /78 (BP Location: Left arm, Patient Position: Sitting, Cuff Size: large)   Pulse 76   Temp 98.1 °F (36.7 °C) (Oral)   Resp 18   Ht 64\"   Wt 191 lb (86.6 kg)   LMP

## 2020-02-13 ENCOUNTER — PATIENT MESSAGE (OUTPATIENT)
Dept: INTERNAL MEDICINE CLINIC | Facility: CLINIC | Age: 70
End: 2020-02-13

## 2020-02-13 RX ORDER — FLUCONAZOLE 150 MG/1
150 TABLET ORAL ONCE
Qty: 1 TABLET | Refills: 0 | Status: SHIPPED | OUTPATIENT
Start: 2020-02-13 | End: 2020-02-13

## 2020-02-13 NOTE — TELEPHONE ENCOUNTER
From: Niels See  To: PATTI Hart  Sent: 2/13/2020 7:51 AM CST  Subject: Prescription Question    You saw me Wednesday and prescribed Augmentin for my sinus infection. I didn’t realize until I picked up the script it is Amoxicillin.  I alway

## 2020-02-13 NOTE — TELEPHONE ENCOUNTER
Please let her know it will not prevent a yeast infection,  Only to take if sympotms. She can use otc monistat cream nightly for prevention while on antibiotic.

## 2020-03-04 ENCOUNTER — PATIENT MESSAGE (OUTPATIENT)
Dept: INTERNAL MEDICINE CLINIC | Facility: CLINIC | Age: 70
End: 2020-03-04

## 2020-03-04 DIAGNOSIS — R92.8 ABNORMAL MAMMOGRAM: Primary | ICD-10-CM

## 2020-03-04 DIAGNOSIS — Z09 FOLLOW-UP EXAM, 3-6 MONTHS SINCE PREVIOUS EXAM: ICD-10-CM

## 2020-03-04 NOTE — TELEPHONE ENCOUNTER
From: Mark Lopez  To: Andrew Kerr MD  Sent: 3/4/2020 2:15 PM CST  Subject: Referral Request    I need a referral to schedule my annual mammogram AND a 6 month right breast ultrasound .  Last mammogram was a year ago and right breast ultrasound

## 2020-03-04 NOTE — TELEPHONE ENCOUNTER
Notes recorded by Liza Araiza MD on 8/29/2019 at 10:55 AM CDT  Please do repeat in 6 months as they recommend    Diag bilateral mammo pended for your approval if ok. Please review and advise. Thank you.

## 2020-03-05 RX ORDER — LEVOTHYROXINE SODIUM 0.07 MG/1
75 TABLET ORAL
Qty: 90 TABLET | Refills: 0 | Status: SHIPPED | OUTPATIENT
Start: 2020-03-05 | End: 2020-06-03

## 2020-03-11 ENCOUNTER — TELEPHONE (OUTPATIENT)
Dept: INTERNAL MEDICINE CLINIC | Facility: CLINIC | Age: 70
End: 2020-03-11

## 2020-03-11 DIAGNOSIS — E78.1 HYPERTRIGLYCERIDEMIA: ICD-10-CM

## 2020-03-11 DIAGNOSIS — E78.00 PURE HYPERCHOLESTEROLEMIA: ICD-10-CM

## 2020-03-11 DIAGNOSIS — R79.89 ELEVATED SERUM CREATININE: ICD-10-CM

## 2020-03-11 DIAGNOSIS — Z00.00 ROUTINE GENERAL MEDICAL EXAMINATION AT A HEALTH CARE FACILITY: Primary | ICD-10-CM

## 2020-03-11 DIAGNOSIS — E03.9 HYPOTHYROIDISM (ACQUIRED): ICD-10-CM

## 2020-03-11 NOTE — TELEPHONE ENCOUNTER
Future Appointments   Date Time Provider Ruben Valarie   3/23/2020 11:00 AM PF ROSA RM1 PF [facility] [place]   6/8/2020 10:30 AM Amrita Burger MD [facility]         Pt would like fasting labs sent to [facility] pl[facility].  Pt aware to complete 5-7

## 2020-03-17 RX ORDER — CITALOPRAM 40 MG/1
40 TABLET ORAL
Qty: 90 TABLET | Refills: 1 | Status: SHIPPED | OUTPATIENT
Start: 2020-03-17 | End: 2020-06-08

## 2020-03-17 NOTE — TELEPHONE ENCOUNTER
LOV:2/12/20 SD  FOV:6/8/20  LAST RX:9/30/19 40 mg take 1 tab daily 90 tabs 1 refill   LAST LABS:12/30/19 urine culture,poct urinalysis  PER PROTOCOL: to provider

## 2020-03-23 ENCOUNTER — HOSPITAL ENCOUNTER (OUTPATIENT)
Dept: MAMMOGRAPHY | Age: 70
Discharge: HOME | End: 2020-03-23
Attending: INTERNAL MEDICINE
Payer: MEDICARE

## 2020-03-23 DIAGNOSIS — Z09 FOLLOW-UP EXAM, 3-6 MONTHS SINCE PREVIOUS EXAM: ICD-10-CM

## 2020-03-23 DIAGNOSIS — R92.8 ABNORMAL MAMMOGRAM: Primary | ICD-10-CM

## 2020-03-23 DIAGNOSIS — R92.8 ABNORMAL MAMMOGRAM: ICD-10-CM

## 2020-03-23 PROCEDURE — 77062 BREAST TOMOSYNTHESIS BI: CPT | Performed by: INTERNAL MEDICINE

## 2020-03-23 PROCEDURE — 77066 DX MAMMO INCL CAD BI: CPT | Performed by: INTERNAL MEDICINE

## 2020-04-03 ENCOUNTER — HOSPITAL ENCOUNTER (OUTPATIENT)
Age: 70
Discharge: HOME | End: 2020-04-03
Attending: FAMILY MEDICINE
Payer: MEDICARE

## 2020-04-03 VITALS
BODY MASS INDEX: 32.44 KG/M2 | RESPIRATION RATE: 18 BRPM | OXYGEN SATURATION: 98 % | DIASTOLIC BLOOD PRESSURE: 71 MMHG | SYSTOLIC BLOOD PRESSURE: 119 MMHG | HEART RATE: 87 BPM | WEIGHT: 190 LBS | TEMPERATURE: 98 F | HEIGHT: 64 IN

## 2020-04-03 DIAGNOSIS — N30.00 ACUTE CYSTITIS WITHOUT HEMATURIA: Primary | ICD-10-CM

## 2020-04-03 PROCEDURE — 87186 SC STD MICRODIL/AGAR DIL: CPT | Performed by: FAMILY MEDICINE

## 2020-04-03 PROCEDURE — 99214 OFFICE O/P EST MOD 30 MIN: CPT

## 2020-04-03 PROCEDURE — 81002 URINALYSIS NONAUTO W/O SCOPE: CPT | Performed by: FAMILY MEDICINE

## 2020-04-03 PROCEDURE — 87077 CULTURE AEROBIC IDENTIFY: CPT | Performed by: FAMILY MEDICINE

## 2020-04-03 PROCEDURE — 87086 URINE CULTURE/COLONY COUNT: CPT | Performed by: FAMILY MEDICINE

## 2020-04-03 RX ORDER — CEFDINIR 300 MG/1
300 CAPSULE ORAL 2 TIMES DAILY
Qty: 20 CAPSULE | Refills: 0 | Status: SHIPPED | OUTPATIENT
Start: 2020-04-03 | End: 2020-04-13

## 2020-04-03 NOTE — ED INITIAL ASSESSMENT (HPI)
Pt with c/o urinary symptoms. Pt with c/o odor and urgency.   Pt has constant retention and has to self cath

## 2020-04-03 NOTE — ED PROVIDER NOTES
Patient Seen in: [address] [facility] [place]      History   Patient presents with:  Urinary Symptoms    Stated Complaint: uti symptoms    HPI  Fatoumata Morillo [age] yo F here with complaints of urinary symptoms - frequency and hesitancy.    C/o odor and some urgency too REMOVAL OF INTERSTIM DEVICE N/A 10/11/2019    Performed by Beth Sanabria MD at [facility]                Family history reviewed with patient/caregiver and is not pertinent to presenting problem.     Social History    Tobacco Use      Smokin Dispense:  20 capsule          Refill:  0    Patient verbalized understanding and agreed with the plan. MDM       Drink at least 96 ounces of water daily. Limit caffeine intake. Void after intercourse.      Avoid bubble baths, hot tubs, swi

## 2020-05-12 RX ORDER — FENOFIBRATE 160 MG/1
TABLET ORAL
Qty: 90 TABLET | Refills: 0 | Status: SHIPPED | OUTPATIENT
Start: 2020-05-12 | End: 2020-08-08

## 2020-05-12 NOTE — TELEPHONE ENCOUNTER
Last OV: 2/12/20 acute f/u    Future Appointments   Date Time Provider Ruben Valarie   6/8/2020 10:30 AM Adeel Ding MD [facility]     Latest RX: FENOFIBRATE 160MG TABLETS 90 tabs 0 refills on 2/12/20    Per protocol, passed. Rx sent.

## 2020-06-03 RX ORDER — LEVOTHYROXINE SODIUM 0.07 MG/1
75 TABLET ORAL
Qty: 90 TABLET | Refills: 0 | Status: SHIPPED | OUTPATIENT
Start: 2020-06-03 | End: 2020-06-18

## 2020-06-03 NOTE — TELEPHONE ENCOUNTER
Last Ov:2/16/19 SD  Upcoming appt:6/8/20 JL  Last labs:no recent blood work  Last Rx:3/5/20 levothyroxine sodium    Per Protocol sent for review

## 2020-06-08 ENCOUNTER — OFFICE VISIT (OUTPATIENT)
Dept: INTERNAL MEDICINE CLINIC | Facility: CLINIC | Age: 70
End: 2020-06-08
Payer: MEDICARE

## 2020-06-08 VITALS
TEMPERATURE: 97 F | RESPIRATION RATE: 16 BRPM | SYSTOLIC BLOOD PRESSURE: 106 MMHG | DIASTOLIC BLOOD PRESSURE: 70 MMHG | HEART RATE: 68 BPM | HEIGHT: 64.17 IN | BODY MASS INDEX: 32.1 KG/M2 | WEIGHT: 188 LBS

## 2020-06-08 DIAGNOSIS — N31.9 NEUROGENIC BLADDER: ICD-10-CM

## 2020-06-08 DIAGNOSIS — F41.1 ANXIETY STATE: ICD-10-CM

## 2020-06-08 DIAGNOSIS — E78.00 PURE HYPERCHOLESTEROLEMIA: Primary | ICD-10-CM

## 2020-06-08 DIAGNOSIS — R33.9 URINARY RETENTION: ICD-10-CM

## 2020-06-08 DIAGNOSIS — Z00.00 ROUTINE GENERAL MEDICAL EXAMINATION AT A HEALTH CARE FACILITY: ICD-10-CM

## 2020-06-08 DIAGNOSIS — F32.A DEPRESSION, UNSPECIFIED DEPRESSION TYPE: ICD-10-CM

## 2020-06-08 DIAGNOSIS — E03.9 HYPOTHYROIDISM (ACQUIRED): ICD-10-CM

## 2020-06-08 DIAGNOSIS — K59.01 SLOW TRANSIT CONSTIPATION: ICD-10-CM

## 2020-06-08 PROCEDURE — 99213 OFFICE O/P EST LOW 20 MIN: CPT | Performed by: INTERNAL MEDICINE

## 2020-06-08 PROCEDURE — G0439 PPPS, SUBSEQ VISIT: HCPCS | Performed by: INTERNAL MEDICINE

## 2020-06-08 PROCEDURE — 90750 HZV VACC RECOMBINANT IM: CPT | Performed by: INTERNAL MEDICINE

## 2020-06-08 PROCEDURE — 90471 IMMUNIZATION ADMIN: CPT | Performed by: INTERNAL MEDICINE

## 2020-06-08 RX ORDER — CITALOPRAM 40 MG/1
40 TABLET ORAL
Qty: 90 TABLET | Refills: 3 | Status: SHIPPED | OUTPATIENT
Start: 2020-06-08 | End: 2021-07-22

## 2020-06-08 RX ORDER — BUPROPION HYDROCHLORIDE 300 MG/1
300 TABLET ORAL DAILY
Qty: 90 TABLET | Refills: 3 | Status: SHIPPED | OUTPATIENT
Start: 2020-06-08 | End: 2021-09-07

## 2020-06-08 NOTE — PROGRESS NOTES
HPI:    Patient ID: Virgie Bean is a 68year old female.     HPI  Here for awv, please see form filled out and scaned, depression and anxiety controlled on meds, hypothyroidism on meds, due for labs, wants to start the shingrix series today  / Right Ear: External ear normal.   Left Ear: External ear normal.   Eyes: Pupils are equal, round, and reactive to light. Right eye exhibits no discharge. Neck: Neck supple. Cardiovascular: Normal rate, regular rhythm and normal heart sounds.    No mur

## 2020-06-13 ENCOUNTER — HOSPITAL ENCOUNTER (OUTPATIENT)
Age: 70
Discharge: HOME | End: 2020-06-13
Attending: FAMILY MEDICINE
Payer: MEDICARE

## 2020-06-13 VITALS
SYSTOLIC BLOOD PRESSURE: 135 MMHG | OXYGEN SATURATION: 98 % | TEMPERATURE: 96 F | DIASTOLIC BLOOD PRESSURE: 87 MMHG | HEART RATE: 85 BPM | RESPIRATION RATE: 18 BRPM

## 2020-06-13 DIAGNOSIS — S61.452A INFECTED DOG BITE OF HAND, LEFT, INITIAL ENCOUNTER: Primary | ICD-10-CM

## 2020-06-13 DIAGNOSIS — L08.9 INFECTED DOG BITE OF HAND, LEFT, INITIAL ENCOUNTER: Primary | ICD-10-CM

## 2020-06-13 DIAGNOSIS — L03.114 CELLULITIS OF LEFT HAND: ICD-10-CM
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
## 2020-06-13 PROCEDURE — 99213 OFFICE O/P EST LOW 20 MIN: CPT

## 2020-06-13 PROCEDURE — 99214 OFFICE O/P EST MOD 30 MIN: CPT

## 2020-06-13 PROCEDURE — 90471 IMMUNIZATION ADMIN: CPT

## 2020-06-13 RX ORDER — FLUCONAZOLE 150 MG/1
150 TABLET ORAL ONCE
Qty: 1 TABLET | Refills: 0 | Status: SHIPPED | OUTPATIENT
Start: 2020-06-13 | End: 2020-06-13

## 2020-06-13 RX ORDER — AMOXICILLIN AND CLAVULANATE POTASSIUM 875; 125 MG/1; MG/1
1 TABLET, FILM COATED ORAL 2 TIMES DAILY
Qty: 20 TABLET | Refills: 0 | Status: SHIPPED | OUTPATIENT
Start: 2020-06-13 | End: 2020-06-23

## 2020-06-13 NOTE — ED PROVIDER NOTES
Patient Seen in: [address] [facility]      History   Patient presents with:  Cellulitis  Bite    Stated Complaint: dog bite    HPI  66-year-old female presents to the immediate care today with chief complaints of a dog bite to the dorsal aspec colonoscopy indicated   • HYSTERECTOMY  1994    total   • LUMPECTOMY RIGHT  2001    dcis   • NEEDLE BIOPSY RIGHT  2001   • OTHER      left elbow   • OTHER  2/8/17    Interstim stage I   • OTHER SURGICAL HISTORY  04/24/2018    stage 2 pudendal interstim  noted.                 ED Course   Labs Reviewed - No data to display        MDM     Wound care instructions given. Soak hand in Epson salt and hot water. Apply Neosporin ointment. Change dressing at least twice a day.   Start Augmentin twice a day for 1

## 2020-06-18 ENCOUNTER — LAB ENCOUNTER (OUTPATIENT)
Dept: LAB | Age: 70
End: 2020-06-18
Attending: INTERNAL MEDICINE
Payer: MEDICARE

## 2020-06-18 ENCOUNTER — TELEPHONE (OUTPATIENT)
Dept: INTERNAL MEDICINE CLINIC | Facility: CLINIC | Age: 70
End: 2020-06-18

## 2020-06-18 DIAGNOSIS — R79.89 ELEVATED SERUM CREATININE: ICD-10-CM

## 2020-06-18 DIAGNOSIS — E03.9 HYPOTHYROIDISM (ACQUIRED): ICD-10-CM

## 2020-06-18 DIAGNOSIS — E78.00 PURE HYPERCHOLESTEROLEMIA: ICD-10-CM

## 2020-06-18 DIAGNOSIS — Z00.00 ROUTINE GENERAL MEDICAL EXAMINATION AT A HEALTH CARE FACILITY: ICD-10-CM

## 2020-06-18 DIAGNOSIS — E78.1 HYPERTRIGLYCERIDEMIA: ICD-10-CM

## 2020-06-18 PROCEDURE — 85025 COMPLETE CBC W/AUTO DIFF WBC: CPT

## 2020-06-18 PROCEDURE — 84439 ASSAY OF FREE THYROXINE: CPT

## 2020-06-18 PROCEDURE — 80053 COMPREHEN METABOLIC PANEL: CPT

## 2020-06-18 PROCEDURE — 84443 ASSAY THYROID STIM HORMONE: CPT

## 2020-06-18 PROCEDURE — 36415 COLL VENOUS BLD VENIPUNCTURE: CPT

## 2020-06-18 PROCEDURE — 80061 LIPID PANEL: CPT

## 2020-06-18 RX ORDER — LEVOTHYROXINE SODIUM 300 UG/1
300 TABLET ORAL
Qty: 90 TABLET | Refills: 1 | Status: SHIPPED | OUTPATIENT
Start: 2020-06-18 | End: 2020-06-19

## 2020-06-19 ENCOUNTER — TELEPHONE (OUTPATIENT)
Dept: INTERNAL MEDICINE CLINIC | Facility: CLINIC | Age: 70
End: 2020-06-19

## 2020-06-19 DIAGNOSIS — E03.9 HYPOTHYROIDISM (ACQUIRED): Primary | ICD-10-CM

## 2020-06-19 RX ORDER — LEVOTHYROXINE SODIUM 88 UG/1
88 TABLET ORAL
Qty: 30 TABLET | Refills: 0 | Status: CANCELLED | OUTPATIENT
Start: 2020-06-19

## 2020-06-19 RX ORDER — LEVOTHYROXINE SODIUM 88 UG/1
88 TABLET ORAL
Qty: 90 TABLET | Refills: 1 | Status: SHIPPED | OUTPATIENT
Start: 2020-06-19 | End: 2020-12-10

## 2020-06-19 NOTE — TELEPHONE ENCOUNTER
JL-just to clarify, patient to take 88mcg not 300mcg correct? I pended rx for your review and approval if ok. Please advise.    Notes recorded by Pamela Gonzalez MD on 6/18/2020 at 5:45 PM CDT  Thyroid slightly undereplaced, on levothyroxine 75mcg po qd,

## 2020-06-19 NOTE — TELEPHONE ENCOUNTER
Levothyroxine Sodium 300 MCG     Pharmacy states last dose was 75 mcg, states that is a huge change. pls call to clarify.

## 2020-08-04 ENCOUNTER — LAB ENCOUNTER (OUTPATIENT)
Dept: LAB | Age: 70
End: 2020-08-04
Attending: INTERNAL MEDICINE
Payer: MEDICARE

## 2020-08-04 DIAGNOSIS — E03.9 HYPOTHYROIDISM (ACQUIRED): ICD-10-CM

## 2020-08-04 DIAGNOSIS — R79.89 ABNORMAL CBC: ICD-10-CM

## 2020-08-04 LAB
BASOPHILS # BLD AUTO: 0.03 X10(3) UL (ref 0–0.2)
BASOPHILS NFR BLD AUTO: 0.7 %
DEPRECATED RDW RBC AUTO: 44.4 FL (ref 35.1–46.3)
EOSINOPHIL # BLD AUTO: 0.18 X10(3) UL (ref 0–0.7)
EOSINOPHIL NFR BLD AUTO: 3.9 %
ERYTHROCYTE [DISTWIDTH] IN BLOOD BY AUTOMATED COUNT: 13 % (ref 11–15)
HCT VFR BLD AUTO: 37.1 % (ref 35–48)
HGB BLD-MCNC: 11.9 G/DL (ref 12–16)
IMM GRANULOCYTES # BLD AUTO: 0.01 X10(3) UL (ref 0–1)
IMM GRANULOCYTES NFR BLD: 0.2 %
LYMPHOCYTES # BLD AUTO: 1.8 X10(3) UL (ref 1–4)
LYMPHOCYTES NFR BLD AUTO: 39.2 %
MCH RBC QN AUTO: 30.1 PG (ref 26–34)
MCHC RBC AUTO-ENTMCNC: 32.1 G/DL (ref 31–37)
MCV RBC AUTO: 93.7 FL (ref 80–100)
MONOCYTES # BLD AUTO: 0.5 X10(3) UL (ref 0.1–1)
MONOCYTES NFR BLD AUTO: 10.9 %
NEUTROPHILS # BLD AUTO: 2.07 X10 (3) UL (ref 1.5–7.7)
NEUTROPHILS # BLD AUTO: 2.07 X10(3) UL (ref 1.5–7.7)
NEUTROPHILS NFR BLD AUTO: 45.1 %
PLATELET # BLD AUTO: 321 10(3)UL (ref 150–450)
RBC # BLD AUTO: 3.96 X10(6)UL (ref 3.8–5.3)
T4 FREE SERPL-MCNC: 0.9 NG/DL (ref 0.8–1.7)
TSI SER-ACNC: 3.15 MIU/ML (ref 0.36–3.74)
WBC # BLD AUTO: 4.6 X10(3) UL (ref 4–11)

## 2020-08-04 PROCEDURE — 84443 ASSAY THYROID STIM HORMONE: CPT

## 2020-08-04 PROCEDURE — 84439 ASSAY OF FREE THYROXINE: CPT

## 2020-08-04 PROCEDURE — 85025 COMPLETE CBC W/AUTO DIFF WBC: CPT

## 2020-08-05 DIAGNOSIS — D64.9 ANEMIA, UNSPECIFIED TYPE: Primary | ICD-10-CM

## 2020-08-08 RX ORDER — FENOFIBRATE 160 MG/1
TABLET ORAL
Qty: 90 TABLET | Refills: 0 | Status: SHIPPED | OUTPATIENT
Start: 2020-08-08 | End: 2020-11-09

## 2020-11-09 RX ORDER — FENOFIBRATE 160 MG/1
TABLET ORAL
Qty: 90 TABLET | Refills: 0 | Status: SHIPPED | OUTPATIENT
Start: 2020-11-09 | End: 2021-02-16

## 2020-11-16 ENCOUNTER — TELEPHONE (OUTPATIENT)
Dept: INTERNAL MEDICINE CLINIC | Facility: CLINIC | Age: 70
End: 2020-11-16

## 2020-11-16 DIAGNOSIS — Z23 NEED FOR SHINGLES VACCINE: Primary | ICD-10-CM

## 2020-11-16 NOTE — TELEPHONE ENCOUNTER
2nd shingles vacc    Future Appointments   Date Time Provider Ruben Sheppard   11/18/2020 10:00 AM [facility] NURSE [facility]

## 2020-11-16 NOTE — TELEPHONE ENCOUNTER
LOV 6/8/20 with EMILY. Pended Shinglix #2 order. Please advise.     Immunizations/Injections    FLU VAC High Dose 65 YRS & Older PRSV Free (34828)9/22/2020, 9/30/2019, 12/15/2017,  ... (1 more)   FLUZONE 6 months and older PFS 0.5 ml (97999)10/1/2018, 10/5/2

## 2020-11-18 ENCOUNTER — NURSE ONLY (OUTPATIENT)
Dept: INTERNAL MEDICINE CLINIC | Facility: CLINIC | Age: 70
End: 2020-11-18
Payer: MEDICARE

## 2020-11-18 PROCEDURE — 90750 HZV VACC RECOMBINANT IM: CPT | Performed by: INTERNAL MEDICINE

## 2020-11-18 PROCEDURE — 90471 IMMUNIZATION ADMIN: CPT | Performed by: INTERNAL MEDICINE

## 2020-11-22 ENCOUNTER — HOSPITAL ENCOUNTER (EMERGENCY)
Facility: HOSPITAL | Age: 70
Discharge: HOME | End: 2020-11-22
Attending: EMERGENCY MEDICINE
Payer: MEDICARE

## 2020-11-22 ENCOUNTER — APPOINTMENT (OUTPATIENT)
Dept: GENERAL RADIOLOGY | Facility: HOSPITAL | Age: 70
End: 2020-11-22
Attending: EMERGENCY MEDICINE
Payer: MEDICARE

## 2020-11-22 VITALS
BODY MASS INDEX: 34.15 KG/M2 | OXYGEN SATURATION: 100 % | TEMPERATURE: 98 F | RESPIRATION RATE: 18 BRPM | WEIGHT: 200 LBS | HEIGHT: 64 IN | SYSTOLIC BLOOD PRESSURE: 124 MMHG | HEART RATE: 72 BPM | DIASTOLIC BLOOD PRESSURE: 72 MMHG

## 2020-11-22 DIAGNOSIS — S70.02XA CONTUSION OF LEFT HIP, INITIAL ENCOUNTER: Primary | ICD-10-CM

## 2020-11-22 PROCEDURE — 99283 EMERGENCY DEPT VISIT LOW MDM: CPT

## 2020-11-22 PROCEDURE — 73502 X-RAY EXAM HIP UNI 2-3 VIEWS: CPT | Performed by: EMERGENCY MEDICINE

## 2020-11-22 RX ORDER — HYDROCODONE BITARTRATE AND ACETAMINOPHEN 5; 325 MG/1; MG/1
1 TABLET ORAL ONCE
Status: COMPLETED | OUTPATIENT
Start: 2020-11-22 | End: 2020-11-22

## 2020-11-22 RX ORDER — HYDROCODONE BITARTRATE AND ACETAMINOPHEN 5; 325 MG/1; MG/1
1 TABLET ORAL EVERY 6 HOURS PRN
Qty: 6 TABLET | Refills: 0 | Status: SHIPPED | OUTPATIENT
Start: 2020-11-22 | End: 2020-11-29

## 2020-11-22 NOTE — ED PROVIDER NOTES
Patient Seen in: [facility] Emergency Department      History   Patient presents with:  Fall    Stated Complaint: Ramila James w/ left hip pain ~ reports dog pulled on the leash and pulled her over.  Fe*    HPI    49-year-old with a history of arthritis, hig PROCEDURE UNLISTED  10/2000    breast surgery lumpectomy   • COLONOSCOPY  09/21/07    diverticulosis, Dr. Madison Manzo, Repeat in 7-10 years   • COLONOSCOPY  2000   • COLONOSCOPY  06/12/2018    high fiber diet,hep c antibody,EGD to evaluate,no further colo focal tenderness to the left hip/pelvis laterally without bruising or deformity. Patient able to actively flex and extend the hip without significant pain. Vascular: 2+ DP PT pulses left lower extremity. Neuro: No saddle anesthesia.   Intact strength and

## 2020-11-22 NOTE — ED INITIAL ASSESSMENT (HPI)
PT walking her dog, when he pulled the leash causing Pt to fall onto grass, PT c/o left hip pain; Pt was able to initially walk on it, however, now cannot bare weight

## 2020-12-10 RX ORDER — LEVOTHYROXINE SODIUM 88 UG/1
88 TABLET ORAL
Qty: 90 TABLET | Refills: 1 | Status: SHIPPED | OUTPATIENT
Start: 2020-12-10 | End: 2021-06-11

## 2020-12-10 NOTE — TELEPHONE ENCOUNTER
LOV:6/8/20, DINESH JL  Last CPE:6/8/20, DINESH JL  Last refill:Levothyroxine Sodium 88 MCG Oral Tab,6/19/20  Quantity:90 tablet, 1 refills  Last labs that are related: cbc, tsh 8/4/20  Future appointment:No future appointments.   Protocol: passed protocol; appr

## 2021-02-16 RX ORDER — FENOFIBRATE 160 MG/1
TABLET ORAL
Qty: 90 TABLET | Refills: 0 | Status: SHIPPED | OUTPATIENT
Start: 2021-02-16 | End: 2021-05-09 | Stop reason: CLARIF

## 2021-03-09 RX ORDER — LORAZEPAM 1 MG/1
1 TABLET ORAL EVERY 8 HOURS PRN
Qty: 20 TABLET | Refills: 0 | OUTPATIENT
Start: 2021-03-09

## 2021-03-09 NOTE — TELEPHONE ENCOUNTER
Last Ov: 6/8/20, JL, CPE  Last labs: TSH+Free T4, CBC 8/4/20  Last Rx: Lorazepam 1mg, #20, 0R 1/31/20    No future appointments. Per Protocol - not on protocol, Rx pending.

## 2021-03-12 ENCOUNTER — TELEPHONE (OUTPATIENT)
Dept: INTERNAL MEDICINE CLINIC | Facility: CLINIC | Age: 71
End: 2021-03-12

## 2021-03-12 DIAGNOSIS — Z00.00 ROUTINE GENERAL MEDICAL EXAMINATION AT A HEALTH CARE FACILITY: ICD-10-CM

## 2021-03-12 DIAGNOSIS — E03.9 HYPOTHYROIDISM (ACQUIRED): ICD-10-CM

## 2021-03-12 DIAGNOSIS — Z12.31 ENCOUNTER FOR SCREENING MAMMOGRAM FOR MALIGNANT NEOPLASM OF BREAST: ICD-10-CM

## 2021-03-12 DIAGNOSIS — E78.00 PURE HYPERCHOLESTEROLEMIA: Primary | ICD-10-CM

## 2021-03-12 RX ORDER — LORAZEPAM 1 MG/1
1 TABLET ORAL EVERY 8 HOURS PRN
Qty: 20 TABLET | Refills: 0 | Status: SHIPPED | OUTPATIENT
Start: 2021-03-12

## 2021-03-12 NOTE — TELEPHONE ENCOUNTER
Patient scheduled for AWV with Dr. Migdalia Chauhan.     Future Appointments   Date Time Provider Ruben Sheppard   6/18/2021 10:00 AM Sabra Hendricks MD [facility]

## 2021-03-12 NOTE — TELEPHONE ENCOUNTER
Patient returning call states she does take this mediation occasionally if she is going to the dentist or ha a social engagement. Pt only receives qty 20 per year. Pt confirmed she is taking Lorazepam 1 mg tab every 8 hours PRN.    Gokul on [place] and M

## 2021-03-13 DIAGNOSIS — Z23 NEED FOR VACCINATION: ICD-10-CM

## 2021-05-03 ENCOUNTER — OFFICE VISIT (OUTPATIENT)
Dept: INTERNAL MEDICINE CLINIC | Facility: CLINIC | Age: 71
End: 2021-05-03
Payer: MEDICARE

## 2021-05-03 ENCOUNTER — HOSPITAL ENCOUNTER (OUTPATIENT)
Dept: GENERAL RADIOLOGY | Age: 71
Discharge: HOME | End: 2021-05-03
Attending: FAMILY MEDICINE
Payer: MEDICARE

## 2021-05-03 VITALS
RESPIRATION RATE: 18 BRPM | TEMPERATURE: 98 F | SYSTOLIC BLOOD PRESSURE: 124 MMHG | HEIGHT: 64 IN | BODY MASS INDEX: 33.4 KG/M2 | DIASTOLIC BLOOD PRESSURE: 82 MMHG | WEIGHT: 195.63 LBS

## 2021-05-03 DIAGNOSIS — G89.29 CHRONIC PAIN OF LEFT KNEE: Primary | ICD-10-CM

## 2021-05-03 DIAGNOSIS — M25.562 CHRONIC PAIN OF LEFT KNEE: Primary | ICD-10-CM

## 2021-05-03 DIAGNOSIS — M25.562 CHRONIC PAIN OF LEFT KNEE: ICD-10-CM

## 2021-05-03 DIAGNOSIS — G89.29 CHRONIC PAIN OF LEFT KNEE: ICD-10-CM

## 2021-05-03 PROCEDURE — 73564 X-RAY EXAM KNEE 4 OR MORE: CPT | Performed by: FAMILY MEDICINE

## 2021-05-03 PROCEDURE — 99214 OFFICE O/P EST MOD 30 MIN: CPT | Performed by: FAMILY MEDICINE

## 2021-05-03 RX ORDER — NAPROXEN 500 MG/1
500 TABLET ORAL 2 TIMES DAILY PRN
Qty: 30 TABLET | Refills: 0 | Status: ON HOLD | OUTPATIENT
Start: 2021-05-03 | End: 2021-05-12

## 2021-05-03 NOTE — PROGRESS NOTES
Marah Johansen  12/4/1950    Patient presents with:  Knee Pain: MR rm 8 L knee pain after a fall while playing golf x1wk       HPI:   Marah Johansen is a 65year old female who presents with evaluation of left knee pain that began while playing g cholesterol    • History of depression    • Hyperlipidemia    • Hypothyroidism    • Osteoarthritis     generalized   • Problems with swallowing    • Thyroid disease    • Urinary retention     surgery scheduled 02/08/17   • Visual impairment     glasses   • • Cancer Brother         Throat   • Hypertension Brother    • Cancer Brother         Prostate   • Hypertension Brother       Social History    Tobacco Use      Smoking status: Never Smoker      Smokeless tobacco: Never Used    Vaping Use      Vaping Use: on Chronic pain of left knee  - XR KNEE, COMPLETE (4 OR MORE VIEWS), LEFT (UHR=94363); Future  - naproxen 500 MG Oral Tab; Take 1 tablet (500 mg total) by mouth 2 (two) times daily as needed. Dispense: 30 tablet;  Refill: 0      All questions were answered

## 2021-05-09 ENCOUNTER — APPOINTMENT (OUTPATIENT)
Dept: ULTRASOUND IMAGING | Facility: HOSPITAL | Age: 71
DRG: 166 | End: 2021-05-09
Attending: EMERGENCY MEDICINE
Payer: MEDICARE

## 2021-05-09 ENCOUNTER — HOSPITAL ENCOUNTER (INPATIENT)
Facility: HOSPITAL | Age: 71
LOS: 4 days | Discharge: HOME | DRG: 166 | End: 2021-05-13
Attending: EMERGENCY MEDICINE | Admitting: INTERNAL MEDICINE
Payer: MEDICARE

## 2021-05-09 ENCOUNTER — APPOINTMENT (OUTPATIENT)
Dept: GENERAL RADIOLOGY | Facility: HOSPITAL | Age: 71
DRG: 166 | End: 2021-05-09
Payer: MEDICARE

## 2021-05-09 ENCOUNTER — APPOINTMENT (OUTPATIENT)
Dept: CV DIAGNOSTICS | Facility: HOSPITAL | Age: 71
DRG: 166 | End: 2021-05-09
Attending: EMERGENCY MEDICINE
Payer: MEDICARE

## 2021-05-09 ENCOUNTER — APPOINTMENT (OUTPATIENT)
Dept: CT IMAGING | Facility: HOSPITAL | Age: 71
DRG: 166 | End: 2021-05-09
Attending: EMERGENCY MEDICINE
Payer: MEDICARE

## 2021-05-09 DIAGNOSIS — R91.8 PULMONARY NODULES: ICD-10-CM

## 2021-05-09 DIAGNOSIS — M17.12 PRIMARY OSTEOARTHRITIS OF LEFT KNEE: ICD-10-CM

## 2021-05-09 DIAGNOSIS — I26.09 ACUTE PULMONARY EMBOLISM WITH ACUTE COR PULMONALE, UNSPECIFIED PULMONARY EMBOLISM TYPE (HCC): Primary | ICD-10-CM

## 2021-05-09 DIAGNOSIS — R79.89 ELEVATED BRAIN NATRIURETIC PEPTIDE (BNP) LEVEL: ICD-10-CM

## 2021-05-09 DIAGNOSIS — I26.09 OTHER ACUTE PULMONARY EMBOLISM WITH ACUTE COR PULMONALE (HCC): ICD-10-CM

## 2021-05-09 DIAGNOSIS — R77.8 ELEVATED TROPONIN: ICD-10-CM

## 2021-05-09 PROCEDURE — 93306 TTE W/DOPPLER COMPLETE: CPT | Performed by: EMERGENCY MEDICINE

## 2021-05-09 PROCEDURE — 99223 1ST HOSP IP/OBS HIGH 75: CPT | Performed by: INTERNAL MEDICINE

## 2021-05-09 PROCEDURE — 93971 EXTREMITY STUDY: CPT | Performed by: EMERGENCY MEDICINE

## 2021-05-09 PROCEDURE — 71260 CT THORAX DX C+: CPT | Performed by: EMERGENCY MEDICINE

## 2021-05-09 PROCEDURE — 71045 X-RAY EXAM CHEST 1 VIEW: CPT | Performed by: EMERGENCY MEDICINE

## 2021-05-09 RX ORDER — ONDANSETRON 2 MG/ML
4 INJECTION INTRAMUSCULAR; INTRAVENOUS EVERY 6 HOURS PRN
Status: DISCONTINUED | OUTPATIENT
Start: 2021-05-09 | End: 2021-05-13

## 2021-05-09 RX ORDER — ASPIRIN 81 MG/1
324 TABLET, CHEWABLE ORAL ONCE
Status: COMPLETED | OUTPATIENT
Start: 2021-05-09 | End: 2021-05-09

## 2021-05-09 RX ORDER — BUPROPION HYDROCHLORIDE 300 MG/1
300 TABLET ORAL DAILY
Status: DISCONTINUED | OUTPATIENT
Start: 2021-05-10 | End: 2021-05-13

## 2021-05-09 RX ORDER — HYDROCODONE BITARTRATE AND ACETAMINOPHEN 5; 325 MG/1; MG/1
1 TABLET ORAL EVERY 4 HOURS PRN
Status: DISCONTINUED | OUTPATIENT
Start: 2021-05-09 | End: 2021-05-13

## 2021-05-09 RX ORDER — LEVOTHYROXINE SODIUM 88 UG/1
88 TABLET ORAL
Status: DISCONTINUED | OUTPATIENT
Start: 2021-05-10 | End: 2021-05-13

## 2021-05-09 RX ORDER — ACETAMINOPHEN 325 MG/1
650 TABLET ORAL EVERY 6 HOURS PRN
Status: DISCONTINUED | OUTPATIENT
Start: 2021-05-09 | End: 2021-05-13

## 2021-05-09 RX ORDER — IBUPROFEN 200 MG
600 TABLET ORAL EVERY 8 HOURS PRN
Status: ON HOLD | COMMUNITY
End: 2021-05-12

## 2021-05-09 RX ORDER — HEPARIN SODIUM AND DEXTROSE 10000; 5 [USP'U]/100ML; G/100ML
18 INJECTION INTRAVENOUS ONCE
Status: COMPLETED | OUTPATIENT
Start: 2021-05-09 | End: 2021-05-09

## 2021-05-09 RX ORDER — HEPARIN SODIUM 5000 [USP'U]/ML
80 INJECTION INTRAVENOUS; SUBCUTANEOUS ONCE
Status: COMPLETED | OUTPATIENT
Start: 2021-05-09 | End: 2021-05-09

## 2021-05-09 RX ORDER — LORAZEPAM 1 MG/1
1 TABLET ORAL EVERY 8 HOURS PRN
Status: DISCONTINUED | OUTPATIENT
Start: 2021-05-09 | End: 2021-05-13

## 2021-05-09 RX ORDER — FENOFIBRATE 134 MG/1
134 CAPSULE ORAL
Status: DISCONTINUED | OUTPATIENT
Start: 2021-05-10 | End: 2021-05-13

## 2021-05-09 RX ORDER — FENOFIBRATE 160 MG/1
160 TABLET ORAL DAILY
COMMUNITY
End: 2021-05-27

## 2021-05-09 RX ORDER — METOCLOPRAMIDE HYDROCHLORIDE 5 MG/ML
10 INJECTION INTRAMUSCULAR; INTRAVENOUS EVERY 8 HOURS PRN
Status: DISCONTINUED | OUTPATIENT
Start: 2021-05-09 | End: 2021-05-13

## 2021-05-09 RX ORDER — ACETAMINOPHEN 325 MG/1
650 TABLET ORAL EVERY 4 HOURS PRN
Status: DISCONTINUED | OUTPATIENT
Start: 2021-05-09 | End: 2021-05-13

## 2021-05-09 RX ORDER — METHENAMINE HIPPURATE 1000 MG/1
1 TABLET ORAL 2 TIMES DAILY
Status: DISCONTINUED | OUTPATIENT
Start: 2021-05-09 | End: 2021-05-10

## 2021-05-09 RX ORDER — HEPARIN SODIUM AND DEXTROSE 10000; 5 [USP'U]/100ML; G/100ML
INJECTION INTRAVENOUS CONTINUOUS
Status: DISCONTINUED | OUTPATIENT
Start: 2021-05-09 | End: 2021-05-11

## 2021-05-09 RX ORDER — ESCITALOPRAM OXALATE 20 MG/1
20 TABLET ORAL DAILY
Status: DISCONTINUED | OUTPATIENT
Start: 2021-05-10 | End: 2021-05-13

## 2021-05-09 RX ORDER — HYDROCODONE BITARTRATE AND ACETAMINOPHEN 5; 325 MG/1; MG/1
2 TABLET ORAL EVERY 4 HOURS PRN
Status: DISCONTINUED | OUTPATIENT
Start: 2021-05-09 | End: 2021-05-13

## 2021-05-09 NOTE — ED INITIAL ASSESSMENT (HPI)
Pt c/o SOB since 4/27 while golfing, progressively worsening since then. Pt states fell last week while golfing injuring her knee, states was referred to PT for her knee. Denies c/o chest pain.

## 2021-05-09 NOTE — PROGRESS NOTES
Patient with severe pulmonary hypertension by echo with RV enlargement. This is new since her last study in 2012 and appears to be new as best as I can tell based on a stress echo 2 years ago.     Therefore, I recommended that she consider catheter directe

## 2021-05-09 NOTE — H&P
[facility] HOSPITAL[facility]  History and Physical     Ammy Mckeon Patient Status:  Emergency    1950 MRN [id number]   Location [address] Attending Jennifer Pope MD   Hosp Day # 0 PCP Usman Metzger MD     Chief Complai has never used smokeless tobacco. She reports current alcohol use. She reports that she does not use drugs.     Family History:   Family History   Problem Relation Age of Onset   • Alcohol and Other Disorders Associated Maternal Grandfather    • Cancer Sist 3  aspirin 325 MG Oral Tab, Take 1 tablet (325 mg total) by mouth daily. , Disp: 30 tablet, Rfl: 0        Review of Systems:   A comprehensive 14 point review of systems was completed. Pertinent positives and negatives noted in the HPI.     Physical Exam: hours.    Inflammatory Markers  Recent Labs   Lab 05/09/21  1155   DDIMER 1.95*       Imaging: Imaging data reviewed in Epic. ASSESSMENT / PLAN:     1. Acute bilateral PE -> ? Due to limited mobility post injury   1. Heparin drip   2.  Echo stat  3. US

## 2021-05-09 NOTE — CONSULTS
[facility]  Report of Consultation    Satish Plummer Patient Status:  Emergency    1950 MRN [id number]   Location [address] [facility] Attending Venessa Aase, MD   Hosp Day # 0 PCP Durand Sandifer, MD     Reason for Con Becca Bueno, Repeat in 7-10 years   • COLONOSCOPY  2000   • COLONOSCOPY  06/12/2018    high fiber diet,hep c antibody,EGD to evaluate,no further colonoscopy indicated   • HYSTERECTOMY  1994    total   • LUMPECTOMY RIGHT  2001    dcis   • NEEDLE BIOPSY RIGHT  20 not currently breastfeeding.   Temp (24hrs), Av.4 °F (36.9 °C), Min:98.4 °F (36.9 °C), Max:98.4 °F (36.9 °C)    Wt Readings from Last 3 Encounters:  21 : 195 lb (88.5 kg)  21 : 195 lb 9.6 oz (88.7 kg)  21 : 200 lb (90.7 kg)      Teleme her blood pressures here are elevated.   We will keep an eye on  this in the hospital.      Elmira Augustin MD  5/9/2021  3:18 PM

## 2021-05-09 NOTE — ED PROVIDER NOTES
Patient Seen in: [facility] Emergency Department      History   Patient presents with:  Difficulty Breathing    Stated Complaint: TL c/o SOB x 3 days without chest pain and denies any heart hx [place]    HPI/Subjective:   HPI    This is 13-year-old wom RIGHT  2001   • OTHER      left elbow   • OTHER  2/8/17    Interstim stage I   • OTHER SURGICAL HISTORY  04/24/2018    stage 2 pudendal interstim dr Michelle Whipple History    Tobacco Use      Smoking status: Never Smoker      Smokeless tobacco components:       Result Value    BUN 21 (*)     BUN/CREA Ratio 23.1 (*)     All other components within normal limits   URINALYSIS WITH CULTURE REFLEX - Abnormal; Notable for the following components:    Blood Urine Moderate (*)     WBC Urine 11-20 (*) PATIENT STATED HISTORY: (As transcribed by Technologist)  Patient states she was golfing and when she swung the club she lost her balance an twisted her left knee. She is here for orthopedic evaluation.     FINDINGS:  BONES:  There is mild joint space narro reduction techniques were used. Dose information is transmitted to the ACR [facility] [employer] [facility]) NRDR ([address]) which includes the Dose Index Registry.   PATIENT STATED HISTORY:(As transcribed by Technologist)  Patient wit morphology or upper lobe location, consider 12 month follow-up. In high risk patients, optional CT in 12 months.      Dictated by (CST): Swapnil Leone MD on 5/09/2021 at 1:11 PM     Finalized by (CST): Swapnil Leone MD on 5/09/2021 at 1:20 PM          Tota Reviewed: 5/3/2021        ICD-10-CM Noted POA    * (Principal) Acute pulmonary embolism with acute cor pulmonale (HCC) I26.09 5/9/2021 Unknown    Azotemia R79.89 5/9/2021 Yes

## 2021-05-09 NOTE — PLAN OF CARE
Aox4, room air, no complaints of chest pain, SOB w/exertion, VSS, heparin drip 16 mL/hr, neurogenic bladder pt self caths, left knee pain, orthopedic surgeon consult called, bedrest, safety precautions in place, will continue to monitor the pt.      ADULT signs/symptoms of CO2 retention  Outcome: Progressing

## 2021-05-10 ENCOUNTER — APPOINTMENT (OUTPATIENT)
Dept: INTERVENTIONAL RADIOLOGY/VASCULAR | Facility: HOSPITAL | Age: 71
DRG: 166 | End: 2021-05-10
Attending: INTERNAL MEDICINE
Payer: MEDICARE

## 2021-05-10 PROCEDURE — 3E06317 INTRODUCTION OF OTHER THROMBOLYTIC INTO CENTRAL ARTERY, PERCUTANEOUS APPROACH: ICD-10-PCS | Performed by: INTERNAL MEDICINE

## 2021-05-10 PROCEDURE — 02FR3Z0 FRAGMENTATION OF LEFT PULMONARY ARTERY, PERCUTANEOUS APPROACH, ULTRASONIC: ICD-10-PCS | Performed by: INTERNAL MEDICINE

## 2021-05-10 PROCEDURE — 99232 SBSQ HOSP IP/OBS MODERATE 35: CPT | Performed by: INTERNAL MEDICINE

## 2021-05-10 PROCEDURE — 37211 THROMBOLYTIC ART THERAPY: CPT | Performed by: INTERNAL MEDICINE

## 2021-05-10 PROCEDURE — 93451 RIGHT HEART CATH: CPT | Performed by: INTERNAL MEDICINE

## 2021-05-10 PROCEDURE — 99222 1ST HOSP IP/OBS MODERATE 55: CPT | Performed by: ORTHOPAEDIC SURGERY

## 2021-05-10 PROCEDURE — 02FQ3Z0 FRAGMENTATION OF RIGHT PULMONARY ARTERY, PERCUTANEOUS APPROACH, ULTRASONIC: ICD-10-PCS | Performed by: INTERNAL MEDICINE

## 2021-05-10 PROCEDURE — 99152 MOD SED SAME PHYS/QHP 5/>YRS: CPT | Performed by: INTERNAL MEDICINE

## 2021-05-10 PROCEDURE — 4A023N6 MEASUREMENT OF CARDIAC SAMPLING AND PRESSURE, RIGHT HEART, PERCUTANEOUS APPROACH: ICD-10-PCS | Performed by: INTERNAL MEDICINE

## 2021-05-10 RX ORDER — LIDOCAINE HYDROCHLORIDE 10 MG/ML
INJECTION, SOLUTION EPIDURAL; INFILTRATION; INTRACAUDAL; PERINEURAL
Status: COMPLETED
Start: 2021-05-10 | End: 2021-05-10

## 2021-05-10 RX ORDER — HEPARIN SODIUM 5000 [USP'U]/ML
30 INJECTION, SOLUTION INTRAVENOUS; SUBCUTANEOUS ONCE
Status: COMPLETED | OUTPATIENT
Start: 2021-05-10 | End: 2021-05-10

## 2021-05-10 RX ORDER — ASPIRIN 81 MG/1
324 TABLET, CHEWABLE ORAL ONCE
Status: DISCONTINUED | OUTPATIENT
Start: 2021-05-10 | End: 2021-05-10 | Stop reason: HOSPADM

## 2021-05-10 RX ORDER — HYDROMORPHONE HYDROCHLORIDE 1 MG/ML
0.5 INJECTION, SOLUTION INTRAMUSCULAR; INTRAVENOUS; SUBCUTANEOUS EVERY 2 HOUR PRN
Status: DISCONTINUED | OUTPATIENT
Start: 2021-05-10 | End: 2021-05-13

## 2021-05-10 RX ORDER — SODIUM CHLORIDE 9 MG/ML
INJECTION, SOLUTION INTRAVENOUS CONTINUOUS
Status: DISCONTINUED | OUTPATIENT
Start: 2021-05-10 | End: 2021-05-11

## 2021-05-10 RX ORDER — MELATONIN
3 NIGHTLY
Status: DISCONTINUED | OUTPATIENT
Start: 2021-05-10 | End: 2021-05-10

## 2021-05-10 RX ORDER — MELATONIN
3 NIGHTLY PRN
Status: DISCONTINUED | OUTPATIENT
Start: 2021-05-10 | End: 2021-05-13

## 2021-05-10 RX ORDER — POTASSIUM CHLORIDE 1.5 G/1.77G
40 POWDER, FOR SOLUTION ORAL EVERY 4 HOURS
Status: COMPLETED | OUTPATIENT
Start: 2021-05-10 | End: 2021-05-10

## 2021-05-10 RX ORDER — MIDAZOLAM HYDROCHLORIDE 1 MG/ML
INJECTION INTRAMUSCULAR; INTRAVENOUS
Status: COMPLETED
Start: 2021-05-10 | End: 2021-05-10

## 2021-05-10 RX ORDER — HEPARIN SODIUM 5000 [USP'U]/ML
INJECTION, SOLUTION INTRAVENOUS; SUBCUTANEOUS
Status: COMPLETED
Start: 2021-05-10 | End: 2021-05-10

## 2021-05-10 NOTE — CONSULTS
[facility] Orth[facility] Consult Note    CC: Left knee pain and swelling    HPI: This 65year old female is admitted and diagnosed with acute pulmonary emboli and is undergoing medical treatment.   She was seen by Dr. Suresh Ramirez for left knee pain about 1 week ago afte DIZZINESS  Family History   Problem Relation Age of Onset   • Alcohol and Other Disorders Associated Maternal Grandfather    • Cancer Sister         breast   • Breast Cancer Sister 39        estimate on age, before age 48   • DCIS Sister    • Diabetes Mate report read. Mild medial joint space narrowing is noted with no acute bony abnormalities.       Labs:   Lab Results   Component Value Date    WBC 8.4 05/09/2021    HGB 12.2 05/09/2021    HCT 37.9 05/09/2021    .0 05/10/2021    CREATSERUM 0.92 05/09/

## 2021-05-10 NOTE — PLAN OF CARE
Patient to room 1091 from cath lab. EKOS catheter in right groin. Site intact, no hematoma noted. Pulses palpable. Head of bed flat, tolerating well. Patient c/o headache, resolved with tylenol. Dr. Cali John notified.

## 2021-05-10 NOTE — PLAN OF CARE
Patient aox3, vss, ra,lungs diminished. NSR, no edema, positive bowel sounds. Straight cath's self a few times a day. Ruiz inserted prior to cath. Bilateral pe, thrombolyc therapy today in cath lab. Heparin gtt at 13.5 ml/hr. Next ptt and bmp at 1400.   Be administer replacement therapy as ordered  Outcome: Progressing     Problem: RESPIRATORY - ADULT  Goal: Achieves optimal ventilation and oxygenation  Description: INTERVENTIONS:  - Assess for changes in respiratory status  - Assess for changes in mentation

## 2021-05-10 NOTE — PLAN OF CARE
Alert & oriented. Room air, saturations WDL. , incentive spirometer encouraged. NSR on telemetry. Heparin gtt infusing per PE protocol. Bowel sounds present, hx of neurogenic bladder, pt self caths with supervision. PRN tylenol for pain.  Left knee area

## 2021-05-10 NOTE — OPERATIVE REPORT
Full note dictated,    PA pressure approximately 45 to 50 mmHg    EKOS catheters placed in the right and left PA, 0.5 mg/h TPA started via each catheter    100 units an hour of heparin started by each 6F sheath (no protocol)    Lisa Holloway MD

## 2021-05-10 NOTE — PROGRESS NOTES
[facility]PITAL[facility]  Progress Note     [facility] Patient Status:  Inpatient    1950 MRN [id number]   [facility] 8NE-A Attending Dr. Michael Hdz Day # 1 PCP Mary Ann Squires MD     Chief Complaint:  PE    S: Patient reports n (based on SCr of 0.92 mg/dL). No results for input(s): PTP, INR in the last 168 hours.          COVID-19 Lab Results    COVID-19  Lab Results   Component Value Date    COVID19 Not Detected 05/09/2021       Pro-Calcitonin  No results for input(s): PCT in Pln:  - heparin drip  - thrombolysis today   - to ICU after  - ortho eval, imaging per ortho of knee     Aditi Suero MD

## 2021-05-11 ENCOUNTER — APPOINTMENT (OUTPATIENT)
Dept: MRI IMAGING | Facility: HOSPITAL | Age: 71
DRG: 166 | End: 2021-05-11
Attending: ORTHOPAEDIC SURGERY
Payer: MEDICARE

## 2021-05-11 PROCEDURE — 99232 SBSQ HOSP IP/OBS MODERATE 35: CPT | Performed by: INTERNAL MEDICINE

## 2021-05-11 PROCEDURE — 73721 MRI JNT OF LWR EXTRE W/O DYE: CPT | Performed by: ORTHOPAEDIC SURGERY

## 2021-05-11 PROCEDURE — 99232 SBSQ HOSP IP/OBS MODERATE 35: CPT | Performed by: ORTHOPAEDIC SURGERY

## 2021-05-11 RX ORDER — LIDOCAINE HYDROCHLORIDE 10 MG/ML
20 INJECTION, SOLUTION INFILTRATION; PERINEURAL ONCE AS NEEDED
Status: DISPENSED | OUTPATIENT
Start: 2021-05-11 | End: 2021-05-11

## 2021-05-11 RX ORDER — TRIAMCINOLONE ACETONIDE 40 MG/ML
40 INJECTION, SUSPENSION INTRA-ARTICULAR; INTRAMUSCULAR ONCE AS NEEDED
Status: DISPENSED | OUTPATIENT
Start: 2021-05-11 | End: 2021-05-11

## 2021-05-11 RX ORDER — HEPARIN SODIUM AND DEXTROSE 10000; 5 [USP'U]/100ML; G/100ML
18 INJECTION INTRAVENOUS ONCE
Status: COMPLETED | OUTPATIENT
Start: 2021-05-11 | End: 2021-05-11

## 2021-05-11 RX ORDER — HEPARIN SODIUM AND DEXTROSE 10000; 5 [USP'U]/100ML; G/100ML
INJECTION INTRAVENOUS CONTINUOUS
Status: DISPENSED | OUTPATIENT
Start: 2021-05-11 | End: 2021-05-13

## 2021-05-11 NOTE — PROGRESS NOTES
[facility]    Orthopedic Progress Note    [facility] Patient Status:  Inpatient    1950 MRN [id number]   [facility] 6NE-A Attending Luna Narvaez MD   Hosp Day # 2 PCP Mary Ann Squires MD     HPI/Subjective:   She is com is a low risk of intra-articular hemorrhage due to the location of the injection however the risk is not 0. We also discussed the risk of infection however we use aseptic technique to minimize this risk.   She is inquiring about pain medication to help wit

## 2021-05-11 NOTE — PROCEDURES
[facility]    PATIENT'S NAME: Chacha Ponce   ATTENDING PHYSICIAN: Iron Bear DO   OPERATING PHYSICIAN: Shara Mejía M.D.    PATIENT ACCOUNT#:   [de-identified]    LOCATION:  [address] [place] [address]  MEDICAL RECORD #:   [id number]       DATE OF BIRTH ventricular pressure was 45/12 mmHg. Pulmonary artery pressure was approximately 52/22 mmHg. The pulmonary artery saturations were 62.4%. This was on room air. Next, a total of 5 mg of tPA was given into the right PA.   An exchange length 0.025 wire w

## 2021-05-11 NOTE — PROGRESS NOTES
[facility]ITAL[facility]  Progress Note     [facility] Patient Status:  Inpatient    1950 MRN [id number]   [facility] 8NE-A Attending Dr. Michael Hdz Day # 2 PCP Mary Ann Squires MD     Chief Complaint:  PE    S: Patient reports d   --  140 139  --    K 3.9   < > 3.4* 3.5 4.9     --  108 109  --    CO2 23.0  --  24.0 22.0  --    ALKPHO 65  --   --   --   --    AST 24  --   --   --   --    ALT 24  --   --   --   --    BILT 0.3  --   --   --   --    TP 7.3  --   --   -- line: no  · If COVID testing is negative, may discontinue isolation: yes     Will the patient be referred to [facility] on discharge?: tbd  Estimated date of discharge: tbd    Plan of care discussed with pt, RN.      RAGHAVENDRA Shields  9:35 AM     Addendum:    Diana Styles

## 2021-05-11 NOTE — PLAN OF CARE
Assumed care of patient at 0730. Patient A/Ox4. Breath sounds clear on RA. VSS, NSR on the monitor. Right venous groin sheath site C/D/I, soft, no hematoma. EKOS catheters d/c'd. Manual pressure held until hemostasis achieved. Pulses palpable.  Ruiz in roger medications as ordered  - Initiate emergency measures for life threatening arrhythmias  - Monitor electrolytes and administer replacement therapy as ordered  Outcome: Progressing     Problem: RESPIRATORY - ADULT  Goal: Achieves optimal ventilation and oxyg

## 2021-05-11 NOTE — PLAN OF CARE
Assumed care of pt at [facility]. VSS, NSR. EKOS intact, R groin site is C/D/I and no hematoma. Pulses all palpable, and no loss of sensation reported by pt. Flynn intact and flynn care done per protocol. K replaced. Pt HOB remained flat throughout the night.  Wi

## 2021-05-11 NOTE — PROGRESS NOTES
[facility]  Cardiology Critical Care Progress Note    Shirley Nagy Patient Status:  Inpatient    1950 MRN [id number]   [facility] 6NE-A Attending Janak Mcintyre MD   Hosp Day # 2 PCP Cordell Balderas MD       Subjective:  Debbie Flores strain, pasp 75-80, moderate tr- actively receiving cdt. clinically stable. Ultrasound neg for dvt. Felt provoked due to limited mobility post knee injury  • Htn, grade 1 diastolic dysfunction  • Left knee pain/swellling-         Plan:     · Dc tPA.  Will p

## 2021-05-12 PROCEDURE — 99223 1ST HOSP IP/OBS HIGH 75: CPT | Performed by: INTERNAL MEDICINE

## 2021-05-12 PROCEDURE — 99232 SBSQ HOSP IP/OBS MODERATE 35: CPT | Performed by: INTERNAL MEDICINE

## 2021-05-12 PROCEDURE — 99233 SBSQ HOSP IP/OBS HIGH 50: CPT | Performed by: PHYSICIAN ASSISTANT

## 2021-05-12 PROCEDURE — 99232 SBSQ HOSP IP/OBS MODERATE 35: CPT | Performed by: HOSPITALIST

## 2021-05-12 NOTE — CONSULTS
Hem/Onc Report of Consultation    Patient Name: Hilton Ruiz   YOB: 1950   Medical Record Number: [id number]   CSN: [id number]   Consulting oncology Physician: Dr Bola Drew  Referring Provider(s): Dr Mercedes Santiago  Date of Consultation: 5/12/2021 years   • COLONOSCOPY  2000   • COLONOSCOPY  06/12/2018    high fiber diet,hep c antibody,EGD to evaluate,no further colonoscopy indicated   • HYSTERECTOMY  1994    total   • LUMPECTOMY RIGHT  2001    dcis   • NEEDLE BIOPSY RIGHT  2001   • OTHER      left Concern: Yes        Special Diet: No        Back Care: Not Asked        Exercise: Yes        Bike Helmet: Not Asked        Seat Belt: No        Self-Exams: Not Asked    Social History Narrative      Not on file    Social Determinants of Health  Financial R [place] for Injection injection, , ,   [COMPLETED] fentaNYL citrate (SUBLIMAZE) 0.05 MG/ML injection, , ,   [COMPLETED] Midazolam HCl (VERSED) 2 MG/2ML injection, , ,   [COMPLETED] alteplase (ACTIVASE) 2 MG injection, , ,   HYDROmorphone HCl (DILAUDID) 1 MG/ daily.  Methenamine Hippurate 1 g Oral Tab, Take 1 tablet (1 g total) by mouth 2 (two) times daily. LORazepam 1 MG Oral Tab, Take 1 tablet (1 mg total) by mouth every 8 (eight) hours as needed.   Levothyroxine Sodium 88 MCG Oral Tab, Take 1 tablet (88 mcg 05/09/21  2008 05/09/21  2008 05/10/21  1529 05/11/21  0050 05/12/21  0449   GLU 83   < > 109*  --  96  --  100*   BUN 21*   < > 16  --  13  --  14   CREATSERUM 0.91   < > 0.92  --  0.77  --  0.68   GFRAA 73   < > 72  --  89  --  101   GFRNAA 63   < > 62 right breast   LIMITED ABDOMEN:  Increased fullness of incompletely visualized left renal pelvis.    BONES:  Mild degenerative changes of spine.             Impression   CONCLUSION:     1. Multiple bilateral pulmonary emboli.  The overall clot burden is con appears to have some degree of anemia dating back to 2017. Labs sent for further evaluation. She reports that she has required iron replacement in the past (IM as a young person)     Incidental finding of pulmonary nodules: multiple small (<3mm) nodules.  Fareed Unger prior to starting oral AC, okay with injection from a heme standpoint if patient and ortho decide to proceed. Plan for this tomorrow morning     Normocytic Anemia: mild, appears to have some degree of anemia dating back to 2017.  Labs sent for further eval

## 2021-05-12 NOTE — CM/SW NOTE
MD order noted for xarelto cost check  first month xarelto- free coupon applied. second month $311, then 3rd month & going forward it is $47. CM will follow up with patient in am to check affordability.

## 2021-05-12 NOTE — PLAN OF CARE
Received care of pt at 0720. Pt A&O x 4. VSS. Right groin c/d/I. +PP. MRI left knee obtained overnight. Tolerating diet. Ruiz to gravity. Heparin gtt hypertherapuetic this am - currently on hold per protocol. No family present during shift.  Denied pain fo

## 2021-05-12 NOTE — PROGRESS NOTES
[facility]  Cardiology Critical Care Progress Note    Emma Trujillo Patient Status:  Inpatient    1950 MRN [id number]   [facility] 6NE-A Attending Eamon Slade MD   Hosp Day # 3 PCP Flako Forbes MD       Subjective:  [place] Ba Continuous dose Heparin infusion Stopped (05/12/21 0117)       Assessment:    • Submassive pe with rv strain, pasp 75-80, moderate tr- s/p cdt- remains on iv heparin. Ultrasound neg for dvt.  Felt provoked due to limited mobility post knee injury  • Htn, gr

## 2021-05-12 NOTE — PROGRESS NOTES
[facility] HOSPITAL[facility]  Progress Note     Hilton Ruiz Patient Status:  Inpatient    1950 MRN [id number]   [facility] 8NE-A Attending Dr. Eunice Ordoñez Day # 3 PCP Saeid Diaz MD     Chief Complaint:  PE    S: Patient reports 0.77  --  0.68   GFRAA 73   < > 72  --  89  --  101   GFRNAA 63   < > 62  --  77  --  88   CA 8.8   < > 8.9  --  8.4*  --  8.4*   ALB 3.7  --   --   --   --   --   --       < > 140  --  139  --  139   K 3.9   < > 3.4*   < > 3.5 4.9 4.1      < > avoid NSAIDs  3. Aspiration on hold due to [facility] - reviewed w/ cards apn  4. Ortho evaluated - f/u on recs. 4. Hypothyroidism   5. HL  6. Anxiety/depression   7. UTI Ecoli 2/2 Neurogenic bladder  1. cx pansensitive - transition to oral abx at dc  2.  Self cat

## 2021-05-13 ENCOUNTER — APPOINTMENT (OUTPATIENT)
Dept: CV DIAGNOSTICS | Facility: HOSPITAL | Age: 71
DRG: 166 | End: 2021-05-13
Attending: NURSE PRACTITIONER
Payer: MEDICARE

## 2021-05-13 VITALS
WEIGHT: 196.19 LBS | HEIGHT: 64.5 IN | TEMPERATURE: 98 F | SYSTOLIC BLOOD PRESSURE: 108 MMHG | OXYGEN SATURATION: 100 % | HEART RATE: 76 BPM | BODY MASS INDEX: 33.09 KG/M2 | RESPIRATION RATE: 16 BRPM | DIASTOLIC BLOOD PRESSURE: 70 MMHG

## 2021-05-13 DIAGNOSIS — D64.9 NORMOCYTIC ANEMIA: Primary | ICD-10-CM

## 2021-05-13 PROCEDURE — 93306 TTE W/DOPPLER COMPLETE: CPT | Performed by: NURSE PRACTITIONER

## 2021-05-13 PROCEDURE — 99239 HOSP IP/OBS DSCHRG MGMT >30: CPT | Performed by: HOSPITALIST

## 2021-05-13 PROCEDURE — 99232 SBSQ HOSP IP/OBS MODERATE 35: CPT | Performed by: INTERNAL MEDICINE

## 2021-05-13 PROCEDURE — 20610 DRAIN/INJ JOINT/BURSA W/O US: CPT | Performed by: ORTHOPAEDIC SURGERY

## 2021-05-13 PROCEDURE — 0S9D3ZZ DRAINAGE OF LEFT KNEE JOINT, PERCUTANEOUS APPROACH: ICD-10-PCS | Performed by: ORTHOPAEDIC SURGERY

## 2021-05-13 RX ORDER — CEPHALEXIN 500 MG/1
500 CAPSULE ORAL 4 TIMES DAILY
Qty: 20 CAPSULE | Refills: 0 | Status: SHIPPED | OUTPATIENT
Start: 2021-05-14 | End: 2021-05-27

## 2021-05-13 RX ORDER — HYDROCODONE BITARTRATE AND ACETAMINOPHEN 5; 325 MG/1; MG/1
1 TABLET ORAL EVERY 4 HOURS PRN
Qty: 15 TABLET | Refills: 0 | Status: SHIPPED | OUTPATIENT
Start: 2021-05-13 | End: 2021-06-18 | Stop reason: ALTCHOICE

## 2021-05-13 NOTE — PROGRESS NOTES
Hematology/Oncology Progress Note    Patient Name: Marlee Lea Record Number: [id number]    YOB: 1950     Reason for Consultation:  Dedra Powell was seen today for the diagnosis of pulmonary embolism    Interval events NEPRELIM 4.20  --   --   --  2.63    < > = values in this interval not displayed. Recent Labs   Lab 05/09/21  1122 05/09/21  1122 05/09/21  2008 05/09/21  2008 05/10/21  1529 05/11/21  0050 05/12/21  0449      < > 140  --  139  --  139   K 3. work up recommended at this time, however repeat CT scan in 12 months could be considered. Okay to discharge home today following her steroid injection. I will arrange for her to follow-up with me in clinic in 2 weeks with repeat labs.       Stephan Foster

## 2021-05-13 NOTE — OCCUPATIONAL THERAPY NOTE
OCCUPATIONAL THERAPY QUICK EVALUATION - INPATIENT    Room Number: 4903/0113-A  Evaluation Date: 5/13/2021     Type of Evaluation: Quick Eval  Presenting Problem: B PEs, MI, L knee effusion s/p aspiration 5/13/21    Physician Order: IP Consult to Occupation fiber diet,hep c antibody,EGD to evaluate,no further colonoscopy indicated   • HYSTERECTOMY  1994    total   • LUMPECTOMY RIGHT  2001    dcis   • NEEDLE BIOPSY RIGHT  2001   • OTHER      left elbow   • OTHER  2/8/17    Interstim stage I   • OTHER SURGICAL body clothing?: None  -   Taking care of personal grooming such as brushing teeth?: None  -   Eating meals?: None    AM-PAC Score:  Score: 24  Approx Degree of Impairment: 0%  Standardized Score (AM-PAC Scale): 57.54  CMS Modifier (G-Code): CH    FUNCTIONA safe return to I/ADL tasks. No further OT services needed at this time.        Patient Complexity  Occupational Profile/Medical History  LOW - Brief history including review of medical or therapy records    Specific performance deficits impacting engagement

## 2021-05-13 NOTE — PROGRESS NOTES
[facility]  Orthopaedic Progress Note    Jono Poag Patient Status:  Inpatient    1950 MRN [id number]   [facility] 6NE-A Attending Shyann Ambriz MD   [facility] Day # 4 PCP Jose Francisco Pond MD     Subjective:  Brady Nair discussed the nature and risks and she elects to proceed giving written consent. Icing, topical Voltaren and modified activities should result in gradual improvement.   The aspirated fluid was heme arthritic in nature and therefore cortisone was not inject

## 2021-05-13 NOTE — PROGRESS NOTES
[facility] H[facility]PITAL[facility]  Progress Note     Aure Holly Patient Status:  Inpatient    1950 MRN [id number]   [facility] 8NE-A Attending Dr. Prince Delacruz Day # 4 PCP Janyce Krabbe, MD     Chief Complaint:  PE    S: Patient reports 05/09/21  2008 05/09/21  2008 05/10/21  1529 05/11/21  0050 05/12/21  0449   GLU 83   < > 109*  --  96  --  100*   BUN 21*   < > 16  --  13  --  14   CREATSERUM 0.91   < > 0.92  --  0.77  --  0.68   GFRAA 73   < > 72  --  89  --  101   GFRNAA 63   < > 62 negative  4. Stable on RA  5. Heparin drip to Xarelto today per hem recs  2. NSTEMI due to above likely  1.  see above, cards following  3. Acute left knee pain 2/2 effusion/patellar tendinopathy  1. 2/2 twisting her knee playing golf  1. Pain control   2.

## 2021-05-13 NOTE — PHYSICAL THERAPY NOTE
PHYSICAL THERAPY QUICK EVALUATION - INPATIENT    Room Number: 1022/5784-N  Evaluation Date: 5/13/2021  Presenting Problem: PE  Physician Order: PT Eval and Treat  64 yo female with hx of twisting/fall L knee 2.5 wks ago while golfing and SOB lightheadedn darwin Rodriguez  Type of Home: House   Home Layout: One level  Stairs to Enter : 2             Lives With: Spouse  Drives: Yes  Patient Owned Equipment: Rolling walker;Cane  Patient Regularly Uses: Glasses    Prior Level of Assistance: Modified independent  Distance (ft): 200  Assistive Device: Cane             Skilled Therapy Provided:   Evaluation as above  Supine>sit indep  Sit<>Stand mod Indep  Amb 200ft with str cane mod indep; antalgic gait  Stairs x 2 with railing and

## 2021-05-13 NOTE — PLAN OF CARE
Assumed patient care at [address]. Patient alert/oriented. VSS. Monitor shows NSR. Heparin gtt infusing per PE/DVT protocol, will stop at 5am per order. Plan for left knee aspiration/cortisone injection in am. Transfer orders, waiting for a bed.  Plan of care up

## 2021-05-13 NOTE — PLAN OF CARE
Assumed care of patient at 0730. Patient A/Ox4. VSS, NSR on the monitor. Breath sounds clear on RA. Right groin site C/D/I,soft, no hematama. Pulses palpable. Heparin titrated per PE/DVT protocol. Reporting mild left knee pain, declined pain medication.  [address] Mirna Ken monitor vital signs, obtain 12 lead EKG if indicated  - Evaluate effectiveness of antiarrhythmic and heart rate control medications as ordered  - Initiate emergency measures for life threatening arrhythmias  - Monitor electrolytes and administer replacemen

## 2021-05-13 NOTE — PLAN OF CARE
Pt. Follows commands, ambulating halls, pt. Discharged home, vitals stable, discharge instructions reviewed with pt.all questions answered, all personal belongings with pt., anticoagulation instructional videos reviewed by patient.

## 2021-05-13 NOTE — DISCHARGE SUMMARY
[facility] PSYCHIATRIC [place] HOSPITALIST  DISCHARGE SUMMARY     Angelita Hillman Patient Status:  Inpatient    1950 MRN [id number]   [facility] 6NE-A Attending Page Ferreira MD   [facility] Day # 4 PCP Rhonda Gonzales MD     Date of Admission: 2021  Anotnio tendinopathy, joint effusion. Aspiration of knee showed hemarthrosis. No cortisol injected. Hematology evaluated and recommended to transition to [address] [place] [address]. Patient will follow up with hematology as outpatient.   She was instructed to follow-up with Ortho XARELTO      Take 1 tablet (20 mg total) by mouth nightly.    Quantity: 30 tablet  Refills: 1        CONTINUE taking these medications      Instructions Prescription details   buPROPion HCl ER (XL) 300 MG Tb24  Commonly known as: WELLBUTRIN XL      Take 1 t [facility] [address] Patrica Glaser    Schedule an appointment as soon as possible for a visit in 2 weeks      Dayne Mccormick MD  [address]  [phone]          -------------------------------

## 2021-05-14 ENCOUNTER — PATIENT OUTREACH (OUTPATIENT)
Dept: CASE MANAGEMENT | Age: 71
End: 2021-05-14

## 2021-05-14 DIAGNOSIS — Z02.9 ENCOUNTERS FOR ADMINISTRATIVE PURPOSE: ICD-10-CM

## 2021-05-14 PROCEDURE — 1111F DSCHRG MED/CURRENT MED MERGE: CPT

## 2021-05-14 NOTE — PROGRESS NOTES
Initial Post Discharge Follow Up   Discharge Date: 5/13/21  Contact Date: 5/14/2021    Consent Verification:  Assessment Completed With: Patient  HIPAA Verified?   Yes    Discharge Dx:   Acute pulmonary embolism with acute cor pulmonale, unspecified pulm mg by mouth twice daily Days 22-30: 20 mg by mouth once daily 51 each 0   • Rivaroxaban 20 MG Oral Tab Take 1 tablet (20 mg total) by mouth nightly. 30 tablet 1   • Fenofibrate 160 MG Oral Tab Take 160 mg by mouth daily.      • Methenamine Hippurate 1 g Josh Rob Treatment with [facility] in [place] ([address] [place] [address])        May 18, 2021  3:00 PM CDT Video Visit with [facility] ([facility])    Please verify your telehealth ins [place] [facility]        Jun 03, 2021  1:00 PM CDT Physical Therapy Ortho Treatment with [facility] in [place] ([facility])        Jun 09, 2021  4:00 PM CDT Physical Therapy Ortho Treatment with SHRUTHI Moulton scheduling with Ortho. She states that she does have a little constipation from the [place] but will start taking stool softeners. She states that her breathing is good now.   She denies having any abdominal pain, n/v, sob, lightheadedness, chest pain or any

## 2021-05-17 ENCOUNTER — OFFICE VISIT (OUTPATIENT)
Dept: PHYSICAL THERAPY | Age: 71
End: 2021-05-17
Attending: PHYSICIAN ASSISTANT
Payer: MEDICARE

## 2021-05-17 DIAGNOSIS — M17.12 PRIMARY OSTEOARTHRITIS OF LEFT KNEE: ICD-10-CM

## 2021-05-17 PROCEDURE — 97162 PT EVAL MOD COMPLEX 30 MIN: CPT

## 2021-05-17 PROCEDURE — 97112 NEUROMUSCULAR REEDUCATION: CPT

## 2021-05-17 NOTE — PROGRESS NOTES
SPINE EVALUATION:   Referring Physician: Dr. John Fischer  Diagnosis: L knee OA     Date of Service: 5/17/2021     PATIENT SUMMARY   Renuka Felipe is a 65year old female who presents to therapy today with complaints of L knee pain  Pt describes pain leve and HEP. Pt voiced understanding and performs HEP correctly without reported pain. Skilled Physical Therapy is medically necessary to address the above impairments and reach functional goals.      Precautions:  None  OBJECTIVE:   Neuro Screen:  Sensation: symptoms including changing pain levels.   PLAN OF CARE:    Goals: (to be met in 16 visits)   Pt will improve FOTO score from 31/100 to 51/100 to display improved ability to stand, walk and navigate stairs  Pt will report pain at its worst from 10/10 to 7/1

## 2021-05-18 ENCOUNTER — TELEMEDICINE (OUTPATIENT)
Dept: INTERNAL MEDICINE CLINIC | Facility: CLINIC | Age: 71
End: 2021-05-18

## 2021-05-18 ENCOUNTER — OFFICE VISIT (OUTPATIENT)
Dept: PHYSICAL THERAPY | Age: 71
End: 2021-05-18
Attending: FAMILY MEDICINE
Payer: MEDICARE

## 2021-05-18 DIAGNOSIS — N30.00 ACUTE CYSTITIS WITHOUT HEMATURIA: ICD-10-CM

## 2021-05-18 DIAGNOSIS — M25.562 ACUTE PAIN OF LEFT KNEE: ICD-10-CM

## 2021-05-18 DIAGNOSIS — I26.09 ACUTE PULMONARY EMBOLISM WITH ACUTE COR PULMONALE, UNSPECIFIED PULMONARY EMBOLISM TYPE (HCC): Primary | ICD-10-CM

## 2021-05-18 DIAGNOSIS — R33.9 URINARY RETENTION: ICD-10-CM

## 2021-05-18 DIAGNOSIS — N31.9 NEUROGENIC BLADDER: ICD-10-CM

## 2021-05-18 PROCEDURE — 97140 MANUAL THERAPY 1/> REGIONS: CPT

## 2021-05-18 PROCEDURE — 97110 THERAPEUTIC EXERCISES: CPT

## 2021-05-18 PROCEDURE — 99495 TRANSJ CARE MGMT MOD F2F 14D: CPT | Performed by: NURSE PRACTITIONER

## 2021-05-18 NOTE — PROGRESS NOTES
TRANSITIONAL CARE CLINIC PHARMACIST MEDICATION RECONCILIATION        lAyssia Escobar MRN [id number]    1950 PCP Gio Sandoval MD       Comments: Medication history completed by the [facility] Pharmacist with the patient using t taking: Reported on 5/14/2021 )     Medication Adherence Assessment:   Patient reports taking the Cephalexin for her UTI. States she is feeling better. She will restart the Methenamine after finishing her Cephalexin.   Reports only using the Hydrocodone/A

## 2021-05-18 NOTE — PROGRESS NOTES
Dx: L knee OA           Insurance (Authorized # of Visits):  Medicare - POC for 16 visits         Authorizing Physician: Dr. Henna Lopez Next MD visit: none scheduled    Fall Risk: standard         Precautions: n/a               Subjective: Pain Rating: 3-4/10 Treatment: 45 min  Total Treatment Time: 45 min  Date: 5/18/2021  Tx#: 2 Date: Tx#: 3 Date:   Tx#: 4   Ther-Ex  -Prone press up x 15  -S/L hip abduction 2 sets x 10  -Supine  Bridge 3 sets x 10  -Supine SLR 3 sets x 10     Manual  -Seated tibfem distracti

## 2021-05-18 NOTE — PROGRESS NOTES
Video Visit  [address]      Please note that the following visit was completed using two-way, real-time interactive audio and video communication.   This has been done in good yulisa to provide continuity of care in the best well since discharge. She is now getting around using a cane and still has intermittent left knee pain. She is taking her Xarelto and has not had any further bleeding and is tolerating well. She reports no further SOB with exertion.  She reports ongoing uri Sodium 88 MCG Oral Tab, Take 1 tablet (88 mcg total) by mouth before breakfast., Disp: 90 tablet, Rfl: 1  buPROPion HCl ER, XL, 300 MG Oral Tablet 24 Hr, Take 1 tablet (300 mg total) by mouth daily. , Disp: 90 tablet, Rfl: 3  Citalopram Hydrobromide 40 MG O Stroke Paternal Grandfather    • Breast Cancer Self 46   • DCIS Self    • Cancer Father         lung   • Hypertension Father    • Cancer Sister         Breast   • Hypertension Sister    • Cancer Brother         Throat   • Hypertension Brother    • Cancer B nodules, measuring less than 6mm.   2017 guidelines from the [address] [place] [address] for the follow-up and management of incidentally detected indeterminate pulmonary nodules in persons at least 28years of age depend on nodule size (average length and width) an Zoster Vaccine Live (Zostavax) 01/29/2013   • Zoster Vaccine Recombinant Adjuvanted (Shingrix) 06/08/2020, 11/18/2020       ROS:  GENERAL:denies fever, + mild LLE weakness  SKIN: denies any skin changes  EYES: denies blurred vision   HEENT: denies ear pain Take 1 tablet by mouth every 4 (four) hours as needed. , Disp: 15 tablet, Rfl: 0  cephALEXin 500 MG Oral Cap, Take 1 capsule (500 mg total) by mouth 4 (four) times daily for 5 days. Next dose tomorrow at lunch time. , Disp: 20 capsule, Rfl: 0  rivaroxaban 15 as listed below in orders Assisted in scheduling required follow-up with community providers and services. Medication Reconciliation:  I am aware of an inpatient discharge within the last 30 days.   The discharge medication list has been reconciled with 30 minutes    Mignon Santiago, APRN, 5/18/2021  [facility]  [phone]

## 2021-05-20 ENCOUNTER — OFFICE VISIT (OUTPATIENT)
Dept: PHYSICAL THERAPY | Age: 71
End: 2021-05-20
Attending: PHYSICIAN ASSISTANT
Payer: MEDICARE

## 2021-05-20 PROCEDURE — 97110 THERAPEUTIC EXERCISES: CPT

## 2021-05-20 NOTE — PROGRESS NOTES
Progress Summary  Pt has attended 2 visits in Physical Therapy.      Dx: L knee OA           Insurance (Authorized # of Visits):  Medicare - POC for 16 visits         Authorizing Physician: Dr. Roger Pena Next MD visit: none scheduled    Fall Risk: standard and has agreed to actively participate in planning and for this course of care. Thank you for your referral. If you have any questions, please contact me at Dept: [phone].     Sincerely,  Electronically signed by therapist: Steve Nesbitt PT     Phys flexed to 90 degrees x 3 min; through 90-0 degrees x 3 min  -     N Re-Ed  -

## 2021-05-21 ENCOUNTER — OFFICE VISIT (OUTPATIENT)
Dept: INTERNAL MEDICINE CLINIC | Facility: CLINIC | Age: 71
End: 2021-05-21
Payer: MEDICARE

## 2021-05-21 VITALS
RESPIRATION RATE: 16 BRPM | HEART RATE: 76 BPM | TEMPERATURE: 98 F | DIASTOLIC BLOOD PRESSURE: 74 MMHG | WEIGHT: 193 LBS | BODY MASS INDEX: 32.55 KG/M2 | HEIGHT: 64.5 IN | SYSTOLIC BLOOD PRESSURE: 118 MMHG

## 2021-05-21 DIAGNOSIS — E78.00 PURE HYPERCHOLESTEROLEMIA: ICD-10-CM

## 2021-05-21 DIAGNOSIS — M17.12 PRIMARY OSTEOARTHRITIS OF LEFT KNEE: ICD-10-CM

## 2021-05-21 DIAGNOSIS — E03.9 HYPOTHYROIDISM (ACQUIRED): ICD-10-CM

## 2021-05-21 DIAGNOSIS — M25.562 ACUTE PAIN OF LEFT KNEE: ICD-10-CM

## 2021-05-21 DIAGNOSIS — I26.09 ACUTE PULMONARY EMBOLISM WITH ACUTE COR PULMONALE, UNSPECIFIED PULMONARY EMBOLISM TYPE (HCC): Primary | ICD-10-CM

## 2021-05-21 DIAGNOSIS — I26.09 OTHER ACUTE PULMONARY EMBOLISM WITH ACUTE COR PULMONALE (HCC): ICD-10-CM

## 2021-05-21 PROBLEM — R77.8 ELEVATED TROPONIN: Status: RESOLVED | Noted: 2021-05-09 | Resolved: 2021-05-21

## 2021-05-21 PROBLEM — R79.89 AZOTEMIA: Status: RESOLVED | Noted: 2021-05-09 | Resolved: 2021-05-21

## 2021-05-21 PROBLEM — R79.89 ELEVATED TROPONIN: Status: RESOLVED | Noted: 2021-05-09 | Resolved: 2021-05-21

## 2021-05-21 PROBLEM — R79.89 ELEVATED BRAIN NATRIURETIC PEPTIDE (BNP) LEVEL: Status: RESOLVED | Noted: 2021-05-09 | Resolved: 2021-05-21

## 2021-05-21 PROCEDURE — 99214 OFFICE O/P EST MOD 30 MIN: CPT | Performed by: NURSE PRACTITIONER

## 2021-05-21 NOTE — PROGRESS NOTES
HPI:    Marah Johansen is a 65year old female here today for hospital follow up.    She was discharged from Inpatient hospital, [facility] to Home   Admission Date: 5/9/21   Discharge Date: 5/13/21  Hospital Discharge Diagnoses (since 4/21/2021 ECHO 3 months. Allergies:  She is allergic to bactrim [sulfamethoxazole w/trimethoprim] and levaquin [levofloxacin]. Current Meds:  HYDROcodone-acetaminophen 5-325 MG Oral Tab, Take 1 tablet by mouth every 4 (four) hours as needed.   rivaroxaban 15 and sister; Diabetes in her brother and maternal grandmother; HIV in her brother; Heart Disease in her mother; Hypertension in her brother, brother, brother, father, mother, and sister; Prostate Cancer in her brother; Stroke in her paternal grandfather. Stable  Levothyroxine. s  Pure hypercholesterolemia  Stable     Primary osteoarthritis of left knee  Has f/u scheduled with Dr Karena Dakin meds. Stable     Acute pain of left knee  As above  Dr Tru Benson.           No orders of the defined types w

## 2021-05-25 ENCOUNTER — APPOINTMENT (OUTPATIENT)
Dept: PHYSICAL THERAPY | Age: 71
End: 2021-05-25
Payer: MEDICARE

## 2021-05-26 ENCOUNTER — APPOINTMENT (OUTPATIENT)
Dept: PHYSICAL THERAPY | Age: 71
End: 2021-05-26
Attending: PHYSICIAN ASSISTANT
Payer: MEDICARE

## 2021-05-27 ENCOUNTER — OFFICE VISIT (OUTPATIENT)
Dept: HEMATOLOGY/ONCOLOGY | Facility: HOSPITAL | Age: 71
End: 2021-05-27
Attending: INTERNAL MEDICINE
Payer: MEDICARE

## 2021-05-27 VITALS
OXYGEN SATURATION: 99 % | DIASTOLIC BLOOD PRESSURE: 80 MMHG | SYSTOLIC BLOOD PRESSURE: 135 MMHG | HEIGHT: 64.49 IN | TEMPERATURE: 98 F | RESPIRATION RATE: 16 BRPM | HEART RATE: 82 BPM | WEIGHT: 193.38 LBS | BODY MASS INDEX: 32.61 KG/M2

## 2021-05-27 DIAGNOSIS — I26.09 OTHER ACUTE PULMONARY EMBOLISM WITH ACUTE COR PULMONALE (HCC): Primary | ICD-10-CM

## 2021-05-27 DIAGNOSIS — R91.8 PULMONARY NODULES: ICD-10-CM

## 2021-05-27 DIAGNOSIS — D64.9 NORMOCYTIC ANEMIA: ICD-10-CM

## 2021-05-27 PROCEDURE — 99215 OFFICE O/P EST HI 40 MIN: CPT | Performed by: INTERNAL MEDICINE

## 2021-05-27 RX ORDER — FENOFIBRATE 160 MG/1
TABLET ORAL
Qty: 90 TABLET | Refills: 0 | Status: SHIPPED | OUTPATIENT
Start: 2021-05-27 | End: 2021-08-10 | Stop reason: CLARIF

## 2021-05-27 NOTE — PROGRESS NOTES
Education Record    Learner:  Patient    Disease / Diagnosis:PE    Barriers / Limitations:  None   Comments:    Method:  Discussion   Comments:    General Topics:  Plan of care reviewed   Comments:    Outcome:  Shows understanding   Comments:post hospital

## 2021-05-27 NOTE — PROGRESS NOTES
Hematology Clinic Follow Up Visit    Patient Name: Marlee Lea Record Number: [id number]    YOB: 1950    PCP: Dr. Bryant Garcia  Other providers: Dr. Eugenio Ramos (Ortho), Dr. Michael Reece (cardiology)    Reason for Cons reflux     Take omepazole   • High cholesterol    • History of depression    • Hyperlipidemia    • Hypothyroidism    • Osteoarthritis     generalized   • Problems with swallowing    • Thyroid disease    • Urinary retention     surgery scheduled 02/08/17 NAUSEA AND VOMITING  Levaquin [Levofloxa*    NAUSEA ONLY, DIZZINESS    Psychosocial History:  Social History    Social History Narrative      Not on file    Social History    Tobacco Use      Smoking status: Never Smoker      Smokeless tobacco: Never Us murmurs, no LE edema  Respiratory: Lungs clear to auscultation bilaterally  GI/Abd: Soft, non-tender with normoactive bowel sounds, no hepatosplenomegaly  Neurological: Grossly intact   Lymphatics: No palpable lymphadenopathy  Skin: no rashes or petechiae 3 months of therapy, however given unprovoked status will consider indefinite duration.  -Reassess at the 3-month meli with D-dimer.   If still with significant dyspnea at the 3-month meli we will need to reassess for pulmonary hypertension     *Left knee p

## 2021-05-28 ENCOUNTER — APPOINTMENT (OUTPATIENT)
Dept: PHYSICAL THERAPY | Age: 71
End: 2021-05-28
Attending: PHYSICIAN ASSISTANT
Payer: MEDICARE

## 2021-06-01 ENCOUNTER — APPOINTMENT (OUTPATIENT)
Dept: PHYSICAL THERAPY | Age: 71
End: 2021-06-01
Payer: MEDICARE

## 2021-06-03 ENCOUNTER — OFFICE VISIT (OUTPATIENT)
Dept: ORTHOPEDICS CLINIC | Facility: CLINIC | Age: 71
End: 2021-06-03
Payer: MEDICARE

## 2021-06-03 ENCOUNTER — APPOINTMENT (OUTPATIENT)
Dept: PHYSICAL THERAPY | Age: 71
End: 2021-06-03
Payer: MEDICARE

## 2021-06-03 VITALS — WEIGHT: 193 LBS | BODY MASS INDEX: 32.95 KG/M2 | HEIGHT: 64 IN

## 2021-06-03 DIAGNOSIS — M17.12 PRIMARY OSTEOARTHRITIS OF LEFT KNEE: ICD-10-CM

## 2021-06-03 DIAGNOSIS — M25.062 HEMARTHROSIS OF LEFT KNEE: Primary | ICD-10-CM

## 2021-06-03 DIAGNOSIS — M25.462 EFFUSION OF LEFT KNEE: ICD-10-CM

## 2021-06-03 PROCEDURE — 99213 OFFICE O/P EST LOW 20 MIN: CPT | Performed by: ORTHOPAEDIC SURGERY

## 2021-06-03 NOTE — PROGRESS NOTES
[facility] Follow-up Progress Note      Chief Complaint: Left knee pain and swelling      History: The patient is a 68-year-old female who presents for follow-up after being seen in the hospital with a left knee hemarthrosis.   She was undergoin her back in follow-up for reassessment. Monica Huddleston MD  [address] [place] [address] Surgery    The dictation was partially prepared using ALIRIO SOPHIA [facility] voice recognition software.   Although every attempt is made to correct errors where identified, holly

## 2021-06-05 ENCOUNTER — APPOINTMENT (OUTPATIENT)
Dept: PHYSICAL THERAPY | Age: 71
End: 2021-06-05
Attending: PHYSICIAN ASSISTANT
Payer: MEDICARE

## 2021-06-09 ENCOUNTER — APPOINTMENT (OUTPATIENT)
Dept: PHYSICAL THERAPY | Age: 71
End: 2021-06-09
Payer: MEDICARE

## 2021-06-09 ENCOUNTER — APPOINTMENT (OUTPATIENT)
Dept: PHYSICAL THERAPY | Age: 71
End: 2021-06-09
Attending: PHYSICIAN ASSISTANT
Payer: MEDICARE

## 2021-06-10 ENCOUNTER — TELEPHONE (OUTPATIENT)
Dept: INTERNAL MEDICINE CLINIC | Facility: CLINIC | Age: 71
End: 2021-06-10

## 2021-06-10 DIAGNOSIS — R92.8 ABNORMAL MAMMOGRAM OF LEFT BREAST: Primary | ICD-10-CM

## 2021-06-10 DIAGNOSIS — R92.1 BREAST CALCIFICATION, LEFT: ICD-10-CM

## 2021-06-10 NOTE — TELEPHONE ENCOUNTER
LOV 6/8/20 with EMILY. Future visit 6/18/21 with EMILY for AWV,    OK to cancel Bilateral Screening Mammogram order? Pended Bilateral Diagnostic 2D 3D Mamogram.  OK to order?

## 2021-06-10 NOTE — TELEPHONE ENCOUNTER
Pt was supposed have done a diagnostic mammo of her left breast which pt failed to do. Pt is now calling to schedule a mammo.  Please advise

## 2021-06-11 ENCOUNTER — APPOINTMENT (OUTPATIENT)
Dept: PHYSICAL THERAPY | Age: 71
End: 2021-06-11
Payer: MEDICARE

## 2021-06-11 RX ORDER — LEVOTHYROXINE SODIUM 88 UG/1
TABLET ORAL
Qty: 90 TABLET | Refills: 1 | Status: SHIPPED | OUTPATIENT
Start: 2021-06-11 | End: 2021-08-10 | Stop reason: CLARIF

## 2021-06-15 ENCOUNTER — APPOINTMENT (OUTPATIENT)
Dept: PHYSICAL THERAPY | Age: 71
End: 2021-06-15
Payer: MEDICARE

## 2021-06-17 ENCOUNTER — HOSPITAL ENCOUNTER (OUTPATIENT)
Dept: MAMMOGRAPHY | Age: 71
Discharge: HOME | End: 2021-06-17
Attending: INTERNAL MEDICINE
Payer: MEDICARE

## 2021-06-17 DIAGNOSIS — R92.8 ABNORMAL MAMMOGRAM OF LEFT BREAST: ICD-10-CM

## 2021-06-17 DIAGNOSIS — R92.1 BREAST CALCIFICATION, LEFT: ICD-10-CM

## 2021-06-17 PROCEDURE — 77066 DX MAMMO INCL CAD BI: CPT | Performed by: INTERNAL MEDICINE

## 2021-06-17 PROCEDURE — 77062 BREAST TOMOSYNTHESIS BI: CPT | Performed by: INTERNAL MEDICINE

## 2021-06-18 ENCOUNTER — OFFICE VISIT (OUTPATIENT)
Dept: INTERNAL MEDICINE CLINIC | Facility: CLINIC | Age: 71
End: 2021-06-18
Payer: MEDICARE

## 2021-06-18 VITALS
TEMPERATURE: 98 F | HEIGHT: 63.5 IN | DIASTOLIC BLOOD PRESSURE: 70 MMHG | SYSTOLIC BLOOD PRESSURE: 120 MMHG | WEIGHT: 192 LBS | HEART RATE: 78 BPM | OXYGEN SATURATION: 94 % | RESPIRATION RATE: 16 BRPM | BODY MASS INDEX: 33.6 KG/M2

## 2021-06-18 DIAGNOSIS — E78.00 PURE HYPERCHOLESTEROLEMIA: ICD-10-CM

## 2021-06-18 DIAGNOSIS — M85.80 OSTEOPENIA, UNSPECIFIED LOCATION: ICD-10-CM

## 2021-06-18 DIAGNOSIS — Z00.00 ROUTINE GENERAL MEDICAL EXAMINATION AT A HEALTH CARE FACILITY: ICD-10-CM

## 2021-06-18 DIAGNOSIS — F32.A DEPRESSION, UNSPECIFIED DEPRESSION TYPE: ICD-10-CM

## 2021-06-18 DIAGNOSIS — Z78.0 POST-MENOPAUSAL: Primary | ICD-10-CM

## 2021-06-18 DIAGNOSIS — N31.9 NEUROGENIC BLADDER: ICD-10-CM

## 2021-06-18 DIAGNOSIS — F41.1 ANXIETY STATE: ICD-10-CM

## 2021-06-18 DIAGNOSIS — I26.99 PULMONARY EMBOLISM, UNSPECIFIED CHRONICITY, UNSPECIFIED PULMONARY EMBOLISM TYPE, UNSPECIFIED WHETHER ACUTE COR PULMONALE PRESENT (HCC): ICD-10-CM

## 2021-06-18 PROCEDURE — 99213 OFFICE O/P EST LOW 20 MIN: CPT | Performed by: INTERNAL MEDICINE

## 2021-06-18 PROCEDURE — G0439 PPPS, SUBSEQ VISIT: HCPCS | Performed by: INTERNAL MEDICINE

## 2021-06-23 NOTE — PROGRESS NOTES
HPI/Subjective:   Patient ID: Aure Holly is a 65year old female.     HPI  Here for pe, hypothyroid, hypercho, hypertrigh, anxity, otherwise feels ok  /70   Pulse 78   Temp 97.5 °F (36.4 °C) (Temporal)   Resp 16   Ht 5' 3.5\" (1.613 m)   Wt 1 Appearance: Normal appearance. HENT:      Head: Atraumatic. Right Ear: Tympanic membrane normal.      Left Ear: Tympanic membrane normal.   Cardiovascular:      Rate and Rhythm: Regular rhythm. Heart sounds: No murmur heard.      Pulmonary:

## 2021-07-21 ENCOUNTER — HOSPITAL ENCOUNTER (OUTPATIENT)
Dept: BONE DENSITY | Age: 71
Discharge: HOME | End: 2021-07-21
Attending: INTERNAL MEDICINE
Payer: MEDICARE

## 2021-07-21 DIAGNOSIS — Z78.0 POST-MENOPAUSAL: ICD-10-CM

## 2021-07-21 PROCEDURE — 77080 DXA BONE DENSITY AXIAL: CPT | Performed by: INTERNAL MEDICINE

## 2021-07-22 RX ORDER — CITALOPRAM 40 MG/1
TABLET ORAL
Qty: 90 TABLET | Refills: 3 | Status: SHIPPED | OUTPATIENT
Start: 2021-07-22 | End: 2021-08-10 | Stop reason: CLARIF

## 2021-07-22 NOTE — TELEPHONE ENCOUNTER
Last VISIT 6/18/21 JL CPE    Last REFILL Citalopram 40mg #90 3R 6/8/20    Last LABS CBC CMP 5/27/21    Future Appointments   Date Time Provider Ruben Sheppard   8/16/2021  2:00 PM Clementina Watkins MD [address]         Per PROTOCOL not on

## 2021-08-10 ENCOUNTER — HOSPITAL ENCOUNTER (INPATIENT)
Facility: HOSPITAL | Age: 71
LOS: 1 days | Discharge: HOME | DRG: 440 | End: 2021-08-11
Attending: EMERGENCY MEDICINE | Admitting: HOSPITALIST
Payer: MEDICARE

## 2021-08-10 ENCOUNTER — APPOINTMENT (OUTPATIENT)
Dept: CT IMAGING | Facility: HOSPITAL | Age: 71
DRG: 440 | End: 2021-08-10
Attending: EMERGENCY MEDICINE
Payer: MEDICARE

## 2021-08-10 DIAGNOSIS — K85.90 ACUTE PANCREATITIS, UNSPECIFIED COMPLICATION STATUS, UNSPECIFIED PANCREATITIS TYPE: Primary | ICD-10-CM

## 2021-08-10 LAB
ALBUMIN SERPL-MCNC: 3.9 G/DL (ref 3.4–5)
ALBUMIN/GLOB SERPL: 1 {RATIO} (ref 1–2)
ALP LIVER SERPL-CCNC: 76 U/L
ALT SERPL-CCNC: 60 U/L
ANION GAP SERPL CALC-SCNC: 3 MMOL/L (ref 0–18)
AST SERPL-CCNC: 84 U/L (ref 15–37)
ATRIAL RATE: 78 BPM
BASOPHILS # BLD AUTO: 0.03 X10(3) UL (ref 0–0.2)
BASOPHILS NFR BLD AUTO: 0.3 %
BILIRUB SERPL-MCNC: 0.4 MG/DL (ref 0.1–2)
BUN BLD-MCNC: 24 MG/DL (ref 7–18)
CALCIUM BLD-MCNC: 9.4 MG/DL (ref 8.5–10.1)
CANCER AG19-9 SERPL-ACNC: 34 U/ML (ref ?–37)
CHLORIDE SERPL-SCNC: 105 MMOL/L (ref 98–112)
CO2 SERPL-SCNC: 27 MMOL/L (ref 21–32)
CREAT BLD-MCNC: 0.94 MG/DL
EOSINOPHIL # BLD AUTO: 0.03 X10(3) UL (ref 0–0.7)
EOSINOPHIL NFR BLD AUTO: 0.3 %
ERYTHROCYTE [DISTWIDTH] IN BLOOD BY AUTOMATED COUNT: 13.2 %
GLOBULIN PLAS-MCNC: 3.8 G/DL (ref 2.8–4.4)
GLUCOSE BLD-MCNC: 107 MG/DL (ref 70–99)
HCT VFR BLD AUTO: 38 %
HGB BLD-MCNC: 12.8 G/DL
IMM GRANULOCYTES # BLD AUTO: 0.02 X10(3) UL (ref 0–1)
IMM GRANULOCYTES NFR BLD: 0.2 %
LIPASE SERPL-CCNC: 1350 U/L (ref 73–393)
LYMPHOCYTES # BLD AUTO: 1.01 X10(3) UL (ref 1–4)
LYMPHOCYTES NFR BLD AUTO: 9.3 %
M PROTEIN MFR SERPL ELPH: 7.7 G/DL (ref 6.4–8.2)
MCH RBC QN AUTO: 30.5 PG (ref 26–34)
MCHC RBC AUTO-ENTMCNC: 33.7 G/DL (ref 31–37)
MCV RBC AUTO: 90.7 FL
MONOCYTES # BLD AUTO: 0.6 X10(3) UL (ref 0.1–1)
MONOCYTES NFR BLD AUTO: 5.5 %
NEUTROPHILS # BLD AUTO: 9.15 X10 (3) UL (ref 1.5–7.7)
NEUTROPHILS # BLD AUTO: 9.15 X10(3) UL (ref 1.5–7.7)
NEUTROPHILS NFR BLD AUTO: 84.4 %
OSMOLALITY SERPL CALC.SUM OF ELEC: 285 MOSM/KG (ref 275–295)
P AXIS: 63 DEGREES
P-R INTERVAL: 170 MS
PLATELET # BLD AUTO: 297 10(3)UL (ref 150–450)
POTASSIUM SERPL-SCNC: 3.9 MMOL/L (ref 3.5–5.1)
Q-T INTERVAL: 380 MS
QRS DURATION: 86 MS
QTC CALCULATION (BEZET): 433 MS
R AXIS: 39 DEGREES
RBC # BLD AUTO: 4.19 X10(6)UL
SARS-COV-2 RNA RESP QL NAA+PROBE: NOT DETECTED
SODIUM SERPL-SCNC: 135 MMOL/L (ref 136–145)
T AXIS: 43 DEGREES
TRIGL SERPL-MCNC: 107 MG/DL (ref 30–149)
TRIGL SERPL-MCNC: 80 MG/DL (ref 30–149)
TROPONIN I SERPL-MCNC: <0.045 NG/ML (ref ?–0.04)
VENTRICULAR RATE: 78 BPM
WBC # BLD AUTO: 10.8 X10(3) UL (ref 4–11)

## 2021-08-10 PROCEDURE — 99223 1ST HOSP IP/OBS HIGH 75: CPT | Performed by: HOSPITALIST

## 2021-08-10 PROCEDURE — 74177 CT ABD & PELVIS W/CONTRAST: CPT | Performed by: EMERGENCY MEDICINE

## 2021-08-10 RX ORDER — LEVOTHYROXINE SODIUM 88 UG/1
88 TABLET ORAL
COMMUNITY
End: 2021-09-07

## 2021-08-10 RX ORDER — ESCITALOPRAM OXALATE 20 MG/1
20 TABLET ORAL DAILY
Status: DISCONTINUED | OUTPATIENT
Start: 2021-08-11 | End: 2021-08-11

## 2021-08-10 RX ORDER — FENOFIBRATE 160 MG/1
160 TABLET ORAL DAILY
COMMUNITY
End: 2021-08-30

## 2021-08-10 RX ORDER — SODIUM CHLORIDE, SODIUM LACTATE, POTASSIUM CHLORIDE, CALCIUM CHLORIDE 600; 310; 30; 20 MG/100ML; MG/100ML; MG/100ML; MG/100ML
INJECTION, SOLUTION INTRAVENOUS CONTINUOUS
Status: DISCONTINUED | OUTPATIENT
Start: 2021-08-10 | End: 2021-08-11

## 2021-08-10 RX ORDER — HYDROCODONE BITARTRATE AND ACETAMINOPHEN 5; 325 MG/1; MG/1
2 TABLET ORAL EVERY 4 HOURS PRN
Status: DISCONTINUED | OUTPATIENT
Start: 2021-08-10 | End: 2021-08-11

## 2021-08-10 RX ORDER — LEVOTHYROXINE SODIUM 88 UG/1
88 TABLET ORAL
Status: DISCONTINUED | OUTPATIENT
Start: 2021-08-11 | End: 2021-08-11

## 2021-08-10 RX ORDER — POLYETHYLENE GLYCOL 3350 17 G/17G
17 POWDER, FOR SOLUTION ORAL DAILY PRN
Status: DISCONTINUED | OUTPATIENT
Start: 2021-08-10 | End: 2021-08-11

## 2021-08-10 RX ORDER — ACETAMINOPHEN 325 MG/1
650 TABLET ORAL EVERY 4 HOURS PRN
Status: DISCONTINUED | OUTPATIENT
Start: 2021-08-10 | End: 2021-08-11

## 2021-08-10 RX ORDER — KETOROLAC TROMETHAMINE 15 MG/ML
15 INJECTION, SOLUTION INTRAMUSCULAR; INTRAVENOUS EVERY 6 HOURS PRN
Status: DISCONTINUED | OUTPATIENT
Start: 2021-08-10 | End: 2021-08-11

## 2021-08-10 RX ORDER — ENOXAPARIN SODIUM 100 MG/ML
40 INJECTION SUBCUTANEOUS DAILY
Status: CANCELLED | OUTPATIENT
Start: 2021-08-10

## 2021-08-10 RX ORDER — BISACODYL 10 MG
10 SUPPOSITORY, RECTAL RECTAL
Status: DISCONTINUED | OUTPATIENT
Start: 2021-08-10 | End: 2021-08-11

## 2021-08-10 RX ORDER — HYDROCODONE BITARTRATE AND ACETAMINOPHEN 5; 325 MG/1; MG/1
1 TABLET ORAL EVERY 4 HOURS PRN
Status: DISCONTINUED | OUTPATIENT
Start: 2021-08-10 | End: 2021-08-11

## 2021-08-10 RX ORDER — MELATONIN
3 NIGHTLY PRN
Status: DISCONTINUED | OUTPATIENT
Start: 2021-08-10 | End: 2021-08-11

## 2021-08-10 RX ORDER — BUPROPION HYDROCHLORIDE 150 MG/1
300 TABLET ORAL DAILY
Status: DISCONTINUED | OUTPATIENT
Start: 2021-08-11 | End: 2021-08-11

## 2021-08-10 RX ORDER — SODIUM CHLORIDE 9 MG/ML
1000 INJECTION, SOLUTION INTRAVENOUS ONCE
Status: COMPLETED | OUTPATIENT
Start: 2021-08-10 | End: 2021-08-11

## 2021-08-10 RX ORDER — HYDROMORPHONE HYDROCHLORIDE 1 MG/ML
0.4 INJECTION, SOLUTION INTRAMUSCULAR; INTRAVENOUS; SUBCUTANEOUS EVERY 2 HOUR PRN
Status: DISCONTINUED | OUTPATIENT
Start: 2021-08-10 | End: 2021-08-11

## 2021-08-10 RX ORDER — LORAZEPAM 1 MG/1
1 TABLET ORAL EVERY 8 HOURS PRN
Status: DISCONTINUED | OUTPATIENT
Start: 2021-08-10 | End: 2021-08-11

## 2021-08-10 RX ORDER — ONDANSETRON 2 MG/ML
8 INJECTION INTRAMUSCULAR; INTRAVENOUS EVERY 6 HOURS PRN
Status: DISCONTINUED | OUTPATIENT
Start: 2021-08-10 | End: 2021-08-11

## 2021-08-10 RX ORDER — ACETAMINOPHEN 325 MG/1
650 TABLET ORAL EVERY 6 HOURS PRN
Status: DISCONTINUED | OUTPATIENT
Start: 2021-08-10 | End: 2021-08-11

## 2021-08-10 RX ORDER — HYDROMORPHONE HYDROCHLORIDE 1 MG/ML
0.8 INJECTION, SOLUTION INTRAMUSCULAR; INTRAVENOUS; SUBCUTANEOUS EVERY 2 HOUR PRN
Status: DISCONTINUED | OUTPATIENT
Start: 2021-08-10 | End: 2021-08-11

## 2021-08-10 RX ORDER — CITALOPRAM 40 MG/1
40 TABLET ORAL DAILY
COMMUNITY

## 2021-08-10 RX ORDER — ACETAMINOPHEN 500 MG
1000 TABLET ORAL EVERY 6 HOURS PRN
COMMUNITY
End: 2021-08-19 | Stop reason: ALTCHOICE

## 2021-08-10 RX ORDER — HYDROMORPHONE HYDROCHLORIDE 1 MG/ML
0.2 INJECTION, SOLUTION INTRAMUSCULAR; INTRAVENOUS; SUBCUTANEOUS EVERY 2 HOUR PRN
Status: DISCONTINUED | OUTPATIENT
Start: 2021-08-10 | End: 2021-08-11

## 2021-08-10 NOTE — ED INITIAL ASSESSMENT (HPI)
Pt noted mid abdominal pain today while golfing, vomiting, loose stools x 2 days. States took pepto bismol, states slight pain relief.

## 2021-08-10 NOTE — CONSULTS
[facility]                       Gastroenterology [address] [facility] Gastroenterology    Altamese Nataliya Patient Status:  Emergency    1950 MRN [id number]   Location [address] [facility] [address] [facility] [address] Attending Jewell Banerjee diet,hep c antibody,EGD to evaluate,no further colonoscopy indicated   • HYSTERECTOMY  1994    total   • LUMPECTOMY RIGHT  2001    dcis   • NEEDLE BIOPSY RIGHT  2001   • OTHER      left elbow   • OTHER  2/8/17    Interstim stage I   • OTHER SURGICAL HISTOR The patient has no history of known chronic anemia            Dermatologic: The patient reports no recent rashes or chronic skin disorders            Rheumatologic: The patient reports no history of chronic arthritis, myalgias, arthralgias            [place] 08/10/2021     08/10/2021    CA 9.4 08/10/2021    ALB 3.9 08/10/2021    ALKPHO 76 08/10/2021    BILT 0.4 08/10/2021    AST 84 08/10/2021    ALT 60 08/10/2021    LIP 1,350 08/10/2021     Recent Labs   Lab 08/10/21  1313   *   BUN 24*   CREATSE

## 2021-08-10 NOTE — H&P
[facility] HOSPITALIST  History and Physical     Freedman Fresh Patient Status:  Emergency    1950 MRN [id number]   Location [address] [facility] Attending Eliot Elizondo, DO   Hosp Day # 0 PCP Joseph Joiner MD     Chief Com History:   Family History   Problem Relation Age of Onset   • Alcohol and Other Disorders Associated Maternal Grandfather    • Cancer Sister         breast   • Breast Cancer Sister 39        estimate on age, before age 48   • DCIS Sister    • Diabetes Mate Ht 5' 4\" (1.626 m)   Wt 195 lb (88.5 kg)   LMP  (LMP Unknown)   SpO2 99%   BMI 33.47 kg/m²   General: No acute distress. Alert and oriented x 3. HEENT: Normocephalic atraumatic. Moist mucous membranes. EOM-I. PERRLA. Anicteric.   Neck: No lymphadenopath xarelto once review complete  3. Hypothyroidism  4. HL  5.  Anxiety/depression  Will do med rec once review complete  Quality:  · DVT Prophylaxis: xarelto once review complete; scds  · CODE status: full  · Ruiz: no  · If COVID testing is negative, may disc

## 2021-08-10 NOTE — ED PROVIDER NOTES
Patient Seen in: [facility] Emergency Department      History   Patient presents with:  Abdomen/Flank Pain  Nausea/Vomiting/Diarrhea    Stated Complaint: abdominal pain, diarrhea.  vomiting while golfing today    HPI/Subjective:   HPI    77-year-old Review of Systems    Positive for stated complaint: abdominal pain, diarrhea.  vomiting while golfing today  Other systems are as noted in HPI. Constitutional and vital signs reviewed.       All other systems reviewed and negative except as noted abo Sodium 135 (*)     BUN 24 (*)     AST 84 (*)     ALT 60 (*)     All other components within normal limits   LIPASE - Abnormal; Notable for the following components:    Lipase 1,350 (*)     All other components within normal limits   CBC W/ DIFFERENTIAL - A matched controls:  116    Change from prior hip examination:  -0.8%           FEMORAL NECK ANALYSIS RESULTS:      Bone mineral density (BMD) (g/cm2):  0.666    Femoral neck T-Score:  -1.7    % young normals:  78    % age matched controls:  105    Change fr ACR (American College of Radiology) NRDR ([address]) which includes the Dose Index Registry.   PATIENT STATED HISTORY:(As transcribed by Technologist)  Patient presents with nause aand diarrhea while golfing today   CONTRAST USED:  10 (CST): Caitlin Arias MD on 8/10/2021 at 5:04 PM     Finalized by (CST): Caitlin Arias MD on 8/10/2021 at 5:09 PM              MDM      Lipase 1350, will get CT abdomen pelvis to evaluate pancreas as well as rule out gallstones.   Giving IV fluids,

## 2021-08-11 VITALS
HEART RATE: 78 BPM | SYSTOLIC BLOOD PRESSURE: 110 MMHG | RESPIRATION RATE: 16 BRPM | WEIGHT: 193.38 LBS | BODY MASS INDEX: 33.02 KG/M2 | TEMPERATURE: 99 F | HEIGHT: 64 IN | DIASTOLIC BLOOD PRESSURE: 59 MMHG | OXYGEN SATURATION: 99 %

## 2021-08-11 LAB
ALBUMIN SERPL-MCNC: 3.2 G/DL (ref 3.4–5)
ALBUMIN/GLOB SERPL: 1 {RATIO} (ref 1–2)
ALP LIVER SERPL-CCNC: 72 U/L
ALT SERPL-CCNC: 85 U/L
ANION GAP SERPL CALC-SCNC: 8 MMOL/L (ref 0–18)
AST SERPL-CCNC: 73 U/L (ref 15–37)
BASOPHILS # BLD AUTO: 0.04 X10(3) UL (ref 0–0.2)
BASOPHILS NFR BLD AUTO: 0.7 %
BILIRUB SERPL-MCNC: 0.6 MG/DL (ref 0.1–2)
BILIRUB UR QL STRIP.AUTO: NEGATIVE
BUN BLD-MCNC: 15 MG/DL (ref 7–18)
CALCIUM BLD-MCNC: 8.4 MG/DL (ref 8.5–10.1)
CHLORIDE SERPL-SCNC: 106 MMOL/L (ref 98–112)
CLARITY UR REFRACT.AUTO: CLEAR
CO2 SERPL-SCNC: 24 MMOL/L (ref 21–32)
COLOR UR AUTO: YELLOW
CREAT BLD-MCNC: 0.93 MG/DL
EOSINOPHIL # BLD AUTO: 0.07 X10(3) UL (ref 0–0.7)
EOSINOPHIL NFR BLD AUTO: 1.2 %
ERYTHROCYTE [DISTWIDTH] IN BLOOD BY AUTOMATED COUNT: 13.5 %
GLOBULIN PLAS-MCNC: 3.2 G/DL (ref 2.8–4.4)
GLUCOSE BLD-MCNC: 82 MG/DL (ref 70–99)
GLUCOSE UR STRIP.AUTO-MCNC: NEGATIVE MG/DL
HAV IGM SER QL: 1.8 MG/DL (ref 1.6–2.6)
HCT VFR BLD AUTO: 33 %
HGB BLD-MCNC: 11 G/DL
IMM GRANULOCYTES # BLD AUTO: 0.02 X10(3) UL (ref 0–1)
IMM GRANULOCYTES NFR BLD: 0.3 %
KETONES UR STRIP.AUTO-MCNC: NEGATIVE MG/DL
LYMPHOCYTES # BLD AUTO: 0.95 X10(3) UL (ref 1–4)
LYMPHOCYTES NFR BLD AUTO: 16.2 %
M PROTEIN MFR SERPL ELPH: 6.4 G/DL (ref 6.4–8.2)
MCH RBC QN AUTO: 30.5 PG (ref 26–34)
MCHC RBC AUTO-ENTMCNC: 33.3 G/DL (ref 31–37)
MCV RBC AUTO: 91.4 FL
MONOCYTES # BLD AUTO: 0.69 X10(3) UL (ref 0.1–1)
MONOCYTES NFR BLD AUTO: 11.8 %
NEUTROPHILS # BLD AUTO: 4.08 X10 (3) UL (ref 1.5–7.7)
NEUTROPHILS # BLD AUTO: 4.08 X10(3) UL (ref 1.5–7.7)
NEUTROPHILS NFR BLD AUTO: 69.8 %
NITRITE UR QL STRIP.AUTO: NEGATIVE
OSMOLALITY SERPL CALC.SUM OF ELEC: 286 MOSM/KG (ref 275–295)
PH UR STRIP.AUTO: 5 [PH] (ref 5–8)
PHOSPHATE SERPL-MCNC: 2.2 MG/DL (ref 2.5–4.9)
PLATELET # BLD AUTO: 232 10(3)UL (ref 150–450)
POTASSIUM SERPL-SCNC: 3.3 MMOL/L (ref 3.5–5.1)
PROT UR STRIP.AUTO-MCNC: NEGATIVE MG/DL
RBC # BLD AUTO: 3.61 X10(6)UL
SODIUM SERPL-SCNC: 138 MMOL/L (ref 136–145)
SP GR UR STRIP.AUTO: 1.01 (ref 1–1.03)
UROBILINOGEN UR STRIP.AUTO-MCNC: <2 MG/DL
WBC # BLD AUTO: 5.9 X10(3) UL (ref 4–11)

## 2021-08-11 PROCEDURE — 99239 HOSP IP/OBS DSCHRG MGMT >30: CPT | Performed by: INTERNAL MEDICINE

## 2021-08-11 RX ORDER — POTASSIUM CHLORIDE 14.9 MG/ML
20 INJECTION INTRAVENOUS ONCE
Status: COMPLETED | OUTPATIENT
Start: 2021-08-11 | End: 2021-08-11

## 2021-08-11 RX ORDER — MAGNESIUM SULFATE HEPTAHYDRATE 40 MG/ML
2 INJECTION, SOLUTION INTRAVENOUS ONCE
Status: COMPLETED | OUTPATIENT
Start: 2021-08-11 | End: 2021-08-11

## 2021-08-11 NOTE — DISCHARGE SUMMARY
[facility] HOSPITALIST  DISCHARGE SUMMARY     Sapna An Patient Status:  Inpatient    1950 MRN [id number]   [facility] 3NE-A Attending Katy Rose, [facility] Day # 1 PCP Uri Ledezma MD     Date of Admission: 8/10/2021  Antonio improved faster than expected.                Procedures during hospitalization:   • None    Incidental or significant findings and recommendations (brief descriptions):  • None    Lab/Test results pending at Discharge:   · Final urine culture  · IgG4    Co Location Instructions:     **IF YOU NEED LABWORK OR AN INFUSION ALONG WITH YOUR APPOINTMENT, YOU MUST CALL TO SCHEDULE.**  Your appointment is on the [facility] in the [facility]. The address is [address] [place], [place] [address]Priya.  Please deb

## 2021-08-11 NOTE — PLAN OF CARE
A&Ox4. On room air, lungs clear. Abdomen soft and tender, BS active. Denies N/V/D. C/o upper abdominal pain- PRN Toradol given with positive result. NSR on tele. Hx PE- on Xarelto. IVF infusing- LR @ 150 mL/hr.  Patient has a neurogenic bladder and self cat as needed  - Evaluate fluid balance  Outcome: Progressing

## 2021-08-11 NOTE — PROGRESS NOTES
ANDRE HOSPITALIST  Progress Note     Pancho Del Patient Status:  Inpatient    1950 MRN [id number]   [facility] [place] 3NE-A Attending Garret Guevara, [address] [facility] Day # 1 PCP Jayro Sin MD     Chief Complaint: Abdominal pain    S: Imaging: Imaging data reviewed in Epic.     Medications:   • potassium phosphate IVPB  15 mmol Intravenous Once    Followed by   • potassium chloride  20 mEq Intravenous Once   • magnesium sulfate  2 g Intravenous Once   • buPROPion  300 mg Oral Daily   •

## 2021-08-11 NOTE — PLAN OF CARE
Assumed care at 0730. Pt a/ox4, VSS, on RA, NSR on telemetry. No c/o pain, n/v/d, SOB, dizziness/lightheadedness. Tolerating CLD. GI following. IVF running LR at 150mL/hr. Pt with neurogenic bladder, self caths.  C/o cloudy, foul smelling urine, UA/C&S orde nonpharmacologic comfort measures as appropriate  - Advance diet as tolerated, if ordered  - Obtain nutritional consult as needed  - Evaluate fluid balance  Outcome: Progressing

## 2021-08-11 NOTE — PROGRESS NOTES
[facility] Gastroenterology Progress Note    CC: Pancreatitis    S: Patient with improved abdominal pain this time. Patient is hungry. Denies nausea or vomiting.     O: BP 99/47 (BP Location: Left arm)   Pulse 80   Temp 98.5 °F (36.9 °C) (Oral)   Res

## 2021-08-11 NOTE — PROGRESS NOTES
NURSING DISCHARGE NOTE    Discharged Home via Wheelchair. Accompanied by Family member and Support staff  Belongings Taken by patient/family. Pt discharged home. Discharge paperwork given to pt.  Pt understands the importance of f/u labs, and appts

## 2021-08-11 NOTE — PROGRESS NOTES
NURSING ADMISSION NOTE      Patient admitted via Cart  Oriented to room 3628  Safety precautions initiated. Bed in low position. Call light in reach. Received patient from ED. A&Ox4. Admission navigator completed. Admission orders received.  Update

## 2021-08-12 ENCOUNTER — PATIENT OUTREACH (OUTPATIENT)
Dept: CASE MANAGEMENT | Age: 71
End: 2021-08-12

## 2021-08-12 DIAGNOSIS — Z02.9 ENCOUNTERS FOR UNSPECIFIED ADMINISTRATIVE PURPOSE: ICD-10-CM

## 2021-08-12 LAB
IMMUNOGLOBULIN G SUBCLASS 1: 466 MG/DL
IMMUNOGLOBULIN G SUBCLASS 2: 173 MG/DL
IMMUNOGLOBULIN G SUBCLASS 3: 38 MG/DL
IMMUNOGLOBULIN G SUBCLASS 4: 28 MG/DL

## 2021-08-12 PROCEDURE — 1111F DSCHRG MED/CURRENT MED MERGE: CPT

## 2021-08-13 ENCOUNTER — PATIENT OUTREACH (OUTPATIENT)
Dept: CASE MANAGEMENT | Age: 71
End: 2021-08-13

## 2021-08-13 NOTE — PROGRESS NOTES
Initial Post Discharge Follow Up   Discharge Date: 8/11/21  Contact Date: 8/12/2021    Consent Verification:  Assessment Completed With: Patient  HIPAA Verified?   Yes    Discharge Dx:     Acute pancreatitis  Elevated LFTs    Was TCC ordered: no    Gener (six) hours as needed for Pain or Fever. • Rivaroxaban 20 MG Oral Tab Take 1 tablet (20 mg total) by mouth daily with food. 30 tablet 2   • Methenamine Hippurate 1 g Oral Tab Take 1 tablet (1 g total) by mouth 2 (two) times daily.  60 tablet 3   • Cristiane Samuel Dr. Cat Leiva) today for a f/u appt. Is there any reason as to why you cannot make your appointments? No     NCM Reviewed upcoming Specialist Appt with patient     Yes    Overall Rating:    How would you rate the care you received while in the hosp

## 2021-08-13 NOTE — PROGRESS NOTES
Patient LVM requesting assistance scheduling apts    Dr.James Vizcaino  [address]  [place] [address]   Apt made with Dao ARMSTRONG for Thurs.  Aug. 19th @11:20am    Lena Montenegro  [address]  [place]

## 2021-08-16 ENCOUNTER — OFFICE VISIT (OUTPATIENT)
Dept: HEMATOLOGY/ONCOLOGY | Facility: HOSPITAL | Age: 71
End: 2021-08-16
Attending: INTERNAL MEDICINE
Payer: MEDICARE

## 2021-08-16 ENCOUNTER — LAB ENCOUNTER (OUTPATIENT)
Dept: LAB | Age: 71
End: 2021-08-16
Attending: INTERNAL MEDICINE
Payer: MEDICARE

## 2021-08-16 VITALS
TEMPERATURE: 98 F | BODY MASS INDEX: 32.72 KG/M2 | OXYGEN SATURATION: 98 % | SYSTOLIC BLOOD PRESSURE: 118 MMHG | HEART RATE: 76 BPM | HEIGHT: 64.49 IN | RESPIRATION RATE: 18 BRPM | WEIGHT: 194 LBS | DIASTOLIC BLOOD PRESSURE: 77 MMHG

## 2021-08-16 DIAGNOSIS — I26.09 OTHER ACUTE PULMONARY EMBOLISM WITH ACUTE COR PULMONALE (HCC): Primary | ICD-10-CM

## 2021-08-16 DIAGNOSIS — E78.00 PURE HYPERCHOLESTEROLEMIA: ICD-10-CM

## 2021-08-16 DIAGNOSIS — K85.90 ACUTE PANCREATITIS, UNSPECIFIED COMPLICATION STATUS, UNSPECIFIED PANCREATITIS TYPE: ICD-10-CM

## 2021-08-16 DIAGNOSIS — I26.09 OTHER ACUTE PULMONARY EMBOLISM WITH ACUTE COR PULMONALE (HCC): ICD-10-CM

## 2021-08-16 DIAGNOSIS — Z00.00 ROUTINE GENERAL MEDICAL EXAMINATION AT A HEALTH CARE FACILITY: ICD-10-CM

## 2021-08-16 DIAGNOSIS — Z71.89 ENCOUNTER FOR ANTICOAGULATION DISCUSSION AND COUNSELING: ICD-10-CM

## 2021-08-16 DIAGNOSIS — E03.9 HYPOTHYROIDISM (ACQUIRED): ICD-10-CM

## 2021-08-16 LAB
ALBUMIN SERPL-MCNC: 3.7 G/DL (ref 3.4–5)
ALBUMIN/GLOB SERPL: 1 {RATIO} (ref 1–2)
ALP LIVER SERPL-CCNC: 122 U/L
ALT SERPL-CCNC: 62 U/L
ANION GAP SERPL CALC-SCNC: 6 MMOL/L (ref 0–18)
AST SERPL-CCNC: 26 U/L (ref 15–37)
BASOPHILS # BLD AUTO: 0.04 X10(3) UL (ref 0–0.2)
BASOPHILS NFR BLD AUTO: 0.7 %
BILIRUB SERPL-MCNC: 0.2 MG/DL (ref 0.1–2)
BUN BLD-MCNC: 26 MG/DL (ref 7–18)
CALCIUM BLD-MCNC: 9.6 MG/DL (ref 8.5–10.1)
CHLORIDE SERPL-SCNC: 104 MMOL/L (ref 98–112)
CHOLEST SMN-MCNC: 234 MG/DL (ref ?–200)
CO2 SERPL-SCNC: 27 MMOL/L (ref 21–32)
CREAT BLD-MCNC: 1.09 MG/DL
D-DIMER: <0.27 UG/ML FEU (ref ?–0.72)
EOSINOPHIL # BLD AUTO: 0.15 X10(3) UL (ref 0–0.7)
EOSINOPHIL NFR BLD AUTO: 2.6 %
ERYTHROCYTE [DISTWIDTH] IN BLOOD BY AUTOMATED COUNT: 13.1 %
GLOBULIN PLAS-MCNC: 3.7 G/DL (ref 2.8–4.4)
GLUCOSE BLD-MCNC: 92 MG/DL (ref 70–99)
HAV IGM SER QL: 2 MG/DL (ref 1.6–2.6)
HCT VFR BLD AUTO: 36 %
HDLC SERPL-MCNC: 54 MG/DL (ref 40–59)
HGB BLD-MCNC: 11.9 G/DL
IMM GRANULOCYTES # BLD AUTO: 0.01 X10(3) UL (ref 0–1)
IMM GRANULOCYTES NFR BLD: 0.2 %
LDLC SERPL CALC-MCNC: 154 MG/DL (ref ?–100)
LYMPHOCYTES # BLD AUTO: 1.67 X10(3) UL (ref 1–4)
LYMPHOCYTES NFR BLD AUTO: 29 %
M PROTEIN MFR SERPL ELPH: 7.4 G/DL (ref 6.4–8.2)
MCH RBC QN AUTO: 30.6 PG (ref 26–34)
MCHC RBC AUTO-ENTMCNC: 33.1 G/DL (ref 31–37)
MCV RBC AUTO: 92.5 FL
MONOCYTES # BLD AUTO: 0.58 X10(3) UL (ref 0.1–1)
MONOCYTES NFR BLD AUTO: 10.1 %
NEUTROPHILS # BLD AUTO: 3.31 X10 (3) UL (ref 1.5–7.7)
NEUTROPHILS # BLD AUTO: 3.31 X10(3) UL (ref 1.5–7.7)
NEUTROPHILS NFR BLD AUTO: 57.4 %
NONHDLC SERPL-MCNC: 180 MG/DL (ref ?–130)
OSMOLALITY SERPL CALC.SUM OF ELEC: 288 MOSM/KG (ref 275–295)
PATIENT FASTING Y/N/NP: NO
PATIENT FASTING Y/N/NP: YES
PHOSPHATE SERPL-MCNC: 3 MG/DL (ref 2.5–4.9)
PLATELET # BLD AUTO: 316 10(3)UL (ref 150–450)
POTASSIUM SERPL-SCNC: 5.1 MMOL/L (ref 3.5–5.1)
RBC # BLD AUTO: 3.89 X10(6)UL
SODIUM SERPL-SCNC: 137 MMOL/L (ref 136–145)
TRIGL SERPL-MCNC: 143 MG/DL (ref 30–149)
TSI SER-ACNC: 2.1 MIU/ML (ref 0.36–3.74)
VLDLC SERPL CALC-MCNC: 27 MG/DL (ref 0–30)
WBC # BLD AUTO: 5.8 X10(3) UL (ref 4–11)

## 2021-08-16 PROCEDURE — 36415 COLL VENOUS BLD VENIPUNCTURE: CPT

## 2021-08-16 PROCEDURE — 99215 OFFICE O/P EST HI 40 MIN: CPT | Performed by: INTERNAL MEDICINE

## 2021-08-16 PROCEDURE — 83735 ASSAY OF MAGNESIUM: CPT

## 2021-08-16 PROCEDURE — 84100 ASSAY OF PHOSPHORUS: CPT

## 2021-08-16 PROCEDURE — 80061 LIPID PANEL: CPT

## 2021-08-16 PROCEDURE — 84443 ASSAY THYROID STIM HORMONE: CPT

## 2021-08-16 NOTE — PROGRESS NOTES
Education Record    Learner:  Patient    Disease / Diagnosis:PE    Barriers / Limitations:  None   Comments:    Method:  Discussion   Comments:    General Topics:  Plan of care reviewed   Comments:    Outcome:  Shows understanding   Comments:3 month f/up.

## 2021-08-16 NOTE — PROGRESS NOTES
Hematology Clinic Follow Up Visit    Patient Name: Marlee Lea Record Number: [id number]    YOB: 1950    PCP: Dr. Michelle Yoon  Other providers: Dr. Juan F Zhang (Ortho), Dr. Nadia Morrison (cardiology)    Reason for Cons Problems with swallowing    • Pulmonary embolism (HCC)    • Thyroid disease    • Urinary retention     surgery scheduled 02/08/17   • Visual impairment     glasses   • Wears glasses        Past Surgical History:   Procedure Laterality Date   • BREAST SURGE file    Social History    Tobacco Use      Smoking status: Never Smoker      Smokeless tobacco: Never Used    Vaping Use      Vaping Use: Never used    Alcohol use: Yes      Comment: rarely    Drug use: No      Family Medical History:  Family History   Pro hepatosplenomegaly  Neurological: Grossly intact   Lymphatics: No palpable lymphadenopathy  Skin: no rashes or petechiae    Laboratory:  Lab Results   Component Value Date    WBC 5.8 08/16/2021    WBC 5.9 08/11/2021    WBC 10.8 08/10/2021    HGB 11.9 (L) 0 5% per year thereafter (~30% over the first 5 years off anticoagulation). Further we discussed the risk for a major bleed on anticoagulation is probably less than 1% per year.   D-dimer today is normal.  We discussed that both options- continuing anticoagu

## 2021-08-19 ENCOUNTER — OFFICE VISIT (OUTPATIENT)
Dept: INTERNAL MEDICINE CLINIC | Facility: CLINIC | Age: 71
End: 2021-08-19
Payer: MEDICARE

## 2021-08-19 VITALS
HEIGHT: 64.49 IN | DIASTOLIC BLOOD PRESSURE: 74 MMHG | TEMPERATURE: 98 F | BODY MASS INDEX: 32.21 KG/M2 | HEART RATE: 76 BPM | SYSTOLIC BLOOD PRESSURE: 124 MMHG | WEIGHT: 191 LBS

## 2021-08-19 DIAGNOSIS — R79.89 ELEVATED LFTS: ICD-10-CM

## 2021-08-19 DIAGNOSIS — N28.9 RENAL INSUFFICIENCY: ICD-10-CM

## 2021-08-19 DIAGNOSIS — E78.00 PURE HYPERCHOLESTEROLEMIA: ICD-10-CM

## 2021-08-19 DIAGNOSIS — K80.20 CALCULUS OF GALLBLADDER WITHOUT CHOLECYSTITIS WITHOUT OBSTRUCTION: ICD-10-CM

## 2021-08-19 DIAGNOSIS — K85.90 ACUTE PANCREATITIS, UNSPECIFIED COMPLICATION STATUS, UNSPECIFIED PANCREATITIS TYPE: Primary | ICD-10-CM

## 2021-08-19 DIAGNOSIS — I26.09 OTHER ACUTE PULMONARY EMBOLISM WITH ACUTE COR PULMONALE (HCC): ICD-10-CM

## 2021-08-19 PROCEDURE — 99496 TRANSJ CARE MGMT HIGH F2F 7D: CPT | Performed by: NURSE PRACTITIONER

## 2021-08-19 NOTE — PROGRESS NOTES
HPI:    Jose R Chao is a 65year old female here today for hospital follow up.    She was discharged from Inpatient hospital, [facility] to Home   Admission Date: 8/10/21   Discharge Date: 8/11/21  Hospital Discharge Diagnoses (since 7/20/202 [levofloxacin]. Current Meds:  cephalexin (KEFLEX) 500 MG Oral Cap, Take 1 capsule (500 mg total) by mouth 3 (three) times daily. citalopram hydrobromide 40 MG Oral Tab, Take 40 mg by mouth daily.   Fenofibrate 160 MG Oral Tab, Take 160 mg by mouth peyton never smoked. She has never used smokeless tobacco. She reports current alcohol use. She reports that she does not use drugs. ROS:   GENERAL: doing well.    LUNGS: denies shortness of breath with exertion  CARDIOVASCULAR: denies chest pain on exertion o Future  -     LIPID PANEL; Future    Calculus of gallbladder without cholecystitis without obstruction  Suspected. She will f/u with General surgery as she is concerned this will happen again. She is uncertain if gallbladder need to be removed.   Will con

## 2021-08-30 RX ORDER — FENOFIBRATE 160 MG/1
TABLET ORAL
Qty: 90 TABLET | Refills: 0 | Status: SHIPPED | OUTPATIENT
Start: 2021-08-30 | End: 2021-11-27

## 2021-09-02 ENCOUNTER — LAB ENCOUNTER (OUTPATIENT)
Dept: LAB | Age: 71
End: 2021-09-02
Attending: NURSE PRACTITIONER
Payer: MEDICARE

## 2021-09-02 DIAGNOSIS — E78.00 PURE HYPERCHOLESTEROLEMIA: ICD-10-CM

## 2021-09-02 LAB
ALBUMIN SERPL-MCNC: 3.8 G/DL (ref 3.4–5)
ALBUMIN/GLOB SERPL: 1.1 {RATIO} (ref 1–2)
ALP LIVER SERPL-CCNC: 76 U/L
ALT SERPL-CCNC: 23 U/L
ANION GAP SERPL CALC-SCNC: 6 MMOL/L (ref 0–18)
AST SERPL-CCNC: 17 U/L (ref 15–37)
BILIRUB SERPL-MCNC: 0.3 MG/DL (ref 0.1–2)
BUN BLD-MCNC: 20 MG/DL (ref 7–18)
CALCIUM BLD-MCNC: 9.2 MG/DL (ref 8.5–10.1)
CHLORIDE SERPL-SCNC: 109 MMOL/L (ref 98–112)
CO2 SERPL-SCNC: 25 MMOL/L (ref 21–32)
CREAT BLD-MCNC: 0.89 MG/DL
GLOBULIN PLAS-MCNC: 3.6 G/DL (ref 2.8–4.4)
GLUCOSE BLD-MCNC: 89 MG/DL (ref 70–99)
M PROTEIN MFR SERPL ELPH: 7.4 G/DL (ref 6.4–8.2)
OSMOLALITY SERPL CALC.SUM OF ELEC: 292 MOSM/KG (ref 275–295)
PATIENT FASTING Y/N/NP: NO
POTASSIUM SERPL-SCNC: 4.6 MMOL/L (ref 3.5–5.1)
SODIUM SERPL-SCNC: 140 MMOL/L (ref 136–145)

## 2021-09-02 PROCEDURE — 36415 COLL VENOUS BLD VENIPUNCTURE: CPT

## 2021-09-02 PROCEDURE — 80053 COMPREHEN METABOLIC PANEL: CPT

## 2021-09-07 ENCOUNTER — TELEPHONE (OUTPATIENT)
Dept: INTERNAL MEDICINE CLINIC | Facility: CLINIC | Age: 71
End: 2021-09-07

## 2021-09-07 RX ORDER — LEVOTHYROXINE SODIUM 88 UG/1
TABLET ORAL
Qty: 90 TABLET | Refills: 0 | Status: SHIPPED | OUTPATIENT
Start: 2021-09-07

## 2021-09-07 RX ORDER — BUPROPION HYDROCHLORIDE 300 MG/1
TABLET ORAL
Qty: 90 TABLET | Refills: 3 | Status: SHIPPED | OUTPATIENT
Start: 2021-09-07

## 2021-09-07 NOTE — TELEPHONE ENCOUNTER
Chacha Ayala - stated there is an interaction with pts scripts, see below. Pharm also stated the script Bupropion was for XL, but the one listed in chart doesn't reflect XL. High TE. Please advise.         BUPROPION 300 MG Oral Tablet 24 Hr 90 tablet 3

## 2021-09-07 NOTE — TELEPHONE ENCOUNTER
Last VISIT - 8/19/21 HFU    Last CPE - 6/18/21 Well visit    Last REFILL -     buPROPion HCl ER, XL, 300 MG Oral Tablet 24 Hr 90 tablet 3 6/8/20     Levothyroxine Sodium 88 MCG Oral Tab - Unknown date    Last LABS - 9/2/21 cmp 8/16/21 cbc    No future appo

## 2021-09-27 ENCOUNTER — OFFICE VISIT (OUTPATIENT)
Dept: SURGERY | Facility: CLINIC | Age: 71
End: 2021-09-27
Payer: MEDICARE

## 2021-09-27 VITALS — DIASTOLIC BLOOD PRESSURE: 80 MMHG | HEART RATE: 70 BPM | TEMPERATURE: 97 F | SYSTOLIC BLOOD PRESSURE: 124 MMHG

## 2021-09-27 DIAGNOSIS — K85.10 ACUTE BILIARY PANCREATITIS WITHOUT INFECTION OR NECROSIS: Primary | ICD-10-CM

## 2021-09-27 PROCEDURE — 99204 OFFICE O/P NEW MOD 45 MIN: CPT | Performed by: SURGERY

## 2021-09-27 NOTE — H&P
New Patient Visit Note       Active Problems      1. Acute biliary pancreatitis without infection or necrosis        Chief Complaint   Patient presents with:  Gallbladder: NP - gallbladder consult -- Denies pain or discomfort at this time.  States in Aug we Jose A Wheatley PA-C under the supervision of Ela Grimes MD.    Roman Starks is allergic to bactrim [sulfamethoxazole w/trimethoprim] and levaquin [levofloxacin].     Past Medical / Surgical / Social / Family History    The past medica Breast Cancer Self 50   • DCIS Self    • Cancer Father         lung   • Hypertension Father    • Cancer Sister         Breast   • Hypertension Sister    • Cancer Brother         Throat   • Hypertension Brother    • Cancer Brother         Prostate   • Hyper Cardiovascular: Negative for chest pain, palpitations and leg swelling. Gastrointestinal: Negative for abdominal distention, abdominal pain, anal bleeding, blood in stool, constipation, diarrhea, nausea and vomiting.    Genitourinary: Negative for diffi Speech: Speech normal.         Behavior: Behavior normal.         Thought Content: Thought content normal.         Judgment: Judgment normal.           CT abdomen/pelvis   Dr. Harris Born personally reviewed the report.     FINDINGS:     LIVER:  No enlargement, bowel obstruction.             Dictated by (CST): Josep Mcclain MD on 8/10/2021 at 5:04 PM         Assessment   Acute biliary pancreatitis without infection or necrosis  (primary encounter diagnosis)      Plan   · Since the patient had gallstone pancr

## 2021-09-27 NOTE — H&P (VIEW-ONLY)
New Patient Visit Note       Active Problems      1. Acute biliary pancreatitis without infection or necrosis        Chief Complaint   Patient presents with:  Gallbladder: NP - gallbladder consult -- Denies pain or discomfort at this time.  States in Aug we Brian Garcia PA-C under the supervision of Mary Miramontes MD.    Romayne Raddle is allergic to bactrim [sulfamethoxazole w/trimethoprim] and levaquin [levofloxacin].     Past Medical / Surgical / Social / Family History    The past medica Breast Cancer Self 50   • DCIS Self    • Cancer Father         lung   • Hypertension Father    • Cancer Sister         Breast   • Hypertension Sister    • Cancer Brother         Throat   • Hypertension Brother    • Cancer Brother         Prostate   • Hyper Cardiovascular: Negative for chest pain, palpitations and leg swelling. Gastrointestinal: Negative for abdominal distention, abdominal pain, anal bleeding, blood in stool, constipation, diarrhea, nausea and vomiting.    Genitourinary: Negative for diffi Speech: Speech normal.         Behavior: Behavior normal.         Thought Content: Thought content normal.         Judgment: Judgment normal.           CT abdomen/pelvis   Dr. Geo Tyson personally reviewed the report.     FINDINGS:     LIVER:  No enlargement, bowel obstruction.             Dictated by (CST): Fabienne Field MD on 8/10/2021 at 5:04 PM         Assessment   Acute biliary pancreatitis without infection or necrosis  (primary encounter diagnosis)      Plan   · Since the patient had gallstone pancr

## 2021-10-15 RX ORDER — ACETAMINOPHEN 500 MG
1000 TABLET ORAL ONCE
Status: CANCELLED | OUTPATIENT
Start: 2021-10-15 | End: 2021-10-15

## 2021-10-18 ENCOUNTER — LAB ENCOUNTER (OUTPATIENT)
Dept: LAB | Age: 71
End: 2021-10-18
Attending: SURGERY
Payer: MEDICARE

## 2021-10-18 DIAGNOSIS — K85.10 ACUTE BILIARY PANCREATITIS, UNSPECIFIED COMPLICATION STATUS: ICD-10-CM

## 2021-10-20 ENCOUNTER — ANESTHESIA EVENT (OUTPATIENT)
Dept: SURGERY | Facility: HOSPITAL | Age: 71
End: 2021-10-20
Payer: MEDICARE

## 2021-10-21 ENCOUNTER — APPOINTMENT (OUTPATIENT)
Dept: GENERAL RADIOLOGY | Facility: HOSPITAL | Age: 71
End: 2021-10-21
Attending: SURGERY
Payer: MEDICARE

## 2021-10-21 ENCOUNTER — ANESTHESIA (OUTPATIENT)
Dept: SURGERY | Facility: HOSPITAL | Age: 71
End: 2021-10-21
Payer: MEDICARE

## 2021-10-21 ENCOUNTER — HOSPITAL ENCOUNTER (OUTPATIENT)
Facility: HOSPITAL | Age: 71
Setting detail: HOSPITAL OUTPATIENT SURGERY
Discharge: HOME | End: 2021-10-21
Attending: SURGERY | Admitting: SURGERY
Payer: MEDICARE

## 2021-10-21 VITALS
SYSTOLIC BLOOD PRESSURE: 123 MMHG | TEMPERATURE: 97 F | BODY MASS INDEX: 31.64 KG/M2 | WEIGHT: 187.63 LBS | DIASTOLIC BLOOD PRESSURE: 65 MMHG | HEIGHT: 64.5 IN | RESPIRATION RATE: 18 BRPM | HEART RATE: 78 BPM | OXYGEN SATURATION: 90 %

## 2021-10-21 DIAGNOSIS — K85.10 ACUTE BILIARY PANCREATITIS, UNSPECIFIED COMPLICATION STATUS: Primary | ICD-10-CM

## 2021-10-21 DIAGNOSIS — K85.10 ACUTE BILIARY PANCREATITIS WITHOUT INFECTION OR NECROSIS: ICD-10-CM

## 2021-10-21 PROCEDURE — BF121ZZ FLUOROSCOPY OF GALLBLADDER USING LOW OSMOLAR CONTRAST: ICD-10-PCS | Performed by: SURGERY

## 2021-10-21 PROCEDURE — 0FT44ZZ RESECTION OF GALLBLADDER, PERCUTANEOUS ENDOSCOPIC APPROACH: ICD-10-PCS | Performed by: SURGERY

## 2021-10-21 PROCEDURE — 88304 TISSUE EXAM BY PATHOLOGIST: CPT | Performed by: SURGERY

## 2021-10-21 PROCEDURE — 74300 X-RAY BILE DUCTS/PANCREAS: CPT | Performed by: SURGERY

## 2021-10-21 RX ORDER — ACETAMINOPHEN 500 MG
1000 TABLET ORAL EVERY 6 HOURS PRN
COMMUNITY
End: 2021-10-21

## 2021-10-21 RX ORDER — BUPIVACAINE HYDROCHLORIDE AND EPINEPHRINE 5; 5 MG/ML; UG/ML
INJECTION, SOLUTION EPIDURAL; INTRACAUDAL; PERINEURAL AS NEEDED
Status: DISCONTINUED | OUTPATIENT
Start: 2021-10-21 | End: 2021-10-21 | Stop reason: HOSPADM

## 2021-10-21 RX ORDER — SODIUM CHLORIDE, SODIUM LACTATE, POTASSIUM CHLORIDE, CALCIUM CHLORIDE 600; 310; 30; 20 MG/100ML; MG/100ML; MG/100ML; MG/100ML
INJECTION, SOLUTION INTRAVENOUS CONTINUOUS
Status: DISCONTINUED | OUTPATIENT
Start: 2021-10-21 | End: 2021-10-21

## 2021-10-21 RX ORDER — HEPARIN SODIUM 5000 [USP'U]/ML
5000 INJECTION, SOLUTION INTRAVENOUS; SUBCUTANEOUS ONCE
Status: COMPLETED | OUTPATIENT
Start: 2021-10-21 | End: 2021-10-21

## 2021-10-21 RX ORDER — DEXAMETHASONE SODIUM PHOSPHATE 4 MG/ML
VIAL (ML) INJECTION AS NEEDED
Status: DISCONTINUED | OUTPATIENT
Start: 2021-10-21 | End: 2021-10-21 | Stop reason: SURG

## 2021-10-21 RX ORDER — ROCURONIUM BROMIDE 10 MG/ML
INJECTION, SOLUTION INTRAVENOUS AS NEEDED
Status: DISCONTINUED | OUTPATIENT
Start: 2021-10-21 | End: 2021-10-21 | Stop reason: SURG

## 2021-10-21 RX ORDER — HYDROCODONE BITARTRATE AND ACETAMINOPHEN 5; 325 MG/1; MG/1
2 TABLET ORAL AS NEEDED
Status: COMPLETED | OUTPATIENT
Start: 2021-10-21 | End: 2021-10-21

## 2021-10-21 RX ORDER — NALOXONE HYDROCHLORIDE 0.4 MG/ML
80 INJECTION, SOLUTION INTRAMUSCULAR; INTRAVENOUS; SUBCUTANEOUS AS NEEDED
Status: DISCONTINUED | OUTPATIENT
Start: 2021-10-21 | End: 2021-10-21

## 2021-10-21 RX ORDER — HYDROCODONE BITARTRATE AND ACETAMINOPHEN 5; 325 MG/1; MG/1
1 TABLET ORAL AS NEEDED
Status: COMPLETED | OUTPATIENT
Start: 2021-10-21 | End: 2021-10-21

## 2021-10-21 RX ORDER — ONDANSETRON 2 MG/ML
INJECTION INTRAMUSCULAR; INTRAVENOUS AS NEEDED
Status: DISCONTINUED | OUTPATIENT
Start: 2021-10-21 | End: 2021-10-21 | Stop reason: SURG

## 2021-10-21 RX ORDER — PHENYLEPHRINE HCL 10 MG/ML
VIAL (ML) INJECTION AS NEEDED
Status: DISCONTINUED | OUTPATIENT
Start: 2021-10-21 | End: 2021-10-21 | Stop reason: SURG

## 2021-10-21 RX ORDER — ONDANSETRON 2 MG/ML
4 INJECTION INTRAMUSCULAR; INTRAVENOUS AS NEEDED
Status: DISCONTINUED | OUTPATIENT
Start: 2021-10-21 | End: 2021-10-21

## 2021-10-21 RX ORDER — HYDROMORPHONE HYDROCHLORIDE 1 MG/ML
0.4 INJECTION, SOLUTION INTRAMUSCULAR; INTRAVENOUS; SUBCUTANEOUS EVERY 5 MIN PRN
Status: DISCONTINUED | OUTPATIENT
Start: 2021-10-21 | End: 2021-10-21

## 2021-10-21 RX ORDER — HYDROCODONE BITARTRATE AND ACETAMINOPHEN 5; 325 MG/1; MG/1
1 TABLET ORAL EVERY 6 HOURS PRN
Qty: 15 TABLET | Refills: 0 | Status: SHIPPED | OUTPATIENT
Start: 2021-10-21

## 2021-10-21 RX ORDER — EPHEDRINE SULFATE 50 MG/ML
INJECTION INTRAVENOUS AS NEEDED
Status: DISCONTINUED | OUTPATIENT
Start: 2021-10-21 | End: 2021-10-21 | Stop reason: SURG

## 2021-10-21 RX ADMIN — ROCURONIUM BROMIDE 10 MG: 10 INJECTION, SOLUTION INTRAVENOUS at 14:10:00

## 2021-10-21 RX ADMIN — EPHEDRINE SULFATE 5 MG: 50 INJECTION INTRAVENOUS at 13:57:00

## 2021-10-21 RX ADMIN — PHENYLEPHRINE HCL 100 MCG: 10 MG/ML VIAL (ML) INJECTION at 13:55:00

## 2021-10-21 RX ADMIN — DEXAMETHASONE SODIUM PHOSPHATE 8 MG: 4 MG/ML VIAL (ML) INJECTION at 13:50:00

## 2021-10-21 RX ADMIN — ROCURONIUM BROMIDE 20 MG: 10 INJECTION, SOLUTION INTRAVENOUS at 13:46:00

## 2021-10-21 RX ADMIN — ROCURONIUM BROMIDE 30 MG: 10 INJECTION, SOLUTION INTRAVENOUS at 13:27:00

## 2021-10-21 RX ADMIN — EPHEDRINE SULFATE 5 MG: 50 INJECTION INTRAVENOUS at 13:54:00

## 2021-10-21 RX ADMIN — EPHEDRINE SULFATE 5 MG: 50 INJECTION INTRAVENOUS at 13:56:00

## 2021-10-21 RX ADMIN — EPHEDRINE SULFATE 5 MG: 50 INJECTION INTRAVENOUS at 13:37:00

## 2021-10-21 RX ADMIN — EPHEDRINE SULFATE 10 MG: 50 INJECTION INTRAVENOUS at 13:33:00

## 2021-10-21 RX ADMIN — ONDANSETRON 4 MG: 2 INJECTION INTRAMUSCULAR; INTRAVENOUS at 13:45:00

## 2021-10-21 RX ADMIN — PHENYLEPHRINE HCL 100 MCG: 10 MG/ML VIAL (ML) INJECTION at 13:38:00

## 2021-10-21 RX ADMIN — EPHEDRINE SULFATE 5 MG: 50 INJECTION INTRAVENOUS at 14:03:00

## 2021-10-21 NOTE — ANESTHESIA PREPROCEDURE EVALUATION
PRE-OP EVALUATION    Patient Name: Marcy Chopra    Admit Diagnosis: Acute biliary pancreatitis, unspecified complication status [H05.50]    Pre-op Diagnosis: Acute biliary pancreatitis, unspecified complication status [M53.30]    LAPAROSCOPIC CHOLEC reviewed. Anesthetic Complications  (-) history of anesthetic complications         GI/Hepatic/Renal                                 Cardiovascular  Comment: Hx of PE May 2021    ECHO 5-13-21  Conclusions:   1. Left ventricle:  The cavity size was normal antibody,EGD to evaluate,no further colonoscopy indicated   • HYSTERECTOMY  1994    total   • LUMPECTOMY RIGHT  2001    dcis   • NEEDLE BIOPSY RIGHT  2001   • OTHER      left elbow   • OTHER  2/8/17    Interstim stage I   • OTHER SURGICAL HISTORY  04/24/20

## 2021-10-21 NOTE — OPERATIVE REPORT
[facility]  Op Note    Ammon Beck Location: OR   [facility] [id number] MRN [id number]   Admission Date 10/21/2021 Operation Date 10/21/2021   Attending Physician Tamica Valentino MD Operating Physician Stephanie Erazo MD   DATE OF OPERATION: incision site. The abdomen was inspected and found to be grossly normal. Under direct vision, a 5 mm subxiphoid port and two right-sided lateral 5 mm ports were placed. The patient was placed head-up, right side up.  The gallbladder was grasped and retracte OR, and went to the PACU in good condition.     Matilde Toscano MD

## 2021-10-21 NOTE — ANESTHESIA PROCEDURE NOTES
Airway  Date/Time: 10/21/2021 1:29 PM  Urgency: elective    Airway not difficult    General Information and Staff    Patient location during procedure: OR  Anesthesiologist: Marilu Jensen DO  Performed: anesthesiologist     Indications and Patient

## 2021-10-21 NOTE — INTERVAL H&P NOTE
Pre-op Diagnosis: Acute biliary pancreatitis, unspecified complication status [H08.56]    The above referenced H&P was reviewed by Stephanie Erazo MD on 10/21/2021, the patient was examined and no significant changes have occurred in the patient's cond

## 2021-10-21 NOTE — ANESTHESIA POSTPROCEDURE EVALUATION
[address] Patient Status:  Hospital Outpatient Surgery   Age/Gender 65year old female MRN [id number]   Location [address] [facility] Attending Adolfo Etienne MD   Hosp Day # 0 PCP Jose Francisco Pond MD

## 2021-10-29 ENCOUNTER — OFFICE VISIT (OUTPATIENT)
Dept: SURGERY | Facility: CLINIC | Age: 71
End: 2021-10-29

## 2021-10-29 VITALS — TEMPERATURE: 97 F | WEIGHT: 187 LBS | HEIGHT: 64.5 IN | BODY MASS INDEX: 31.54 KG/M2

## 2021-10-29 DIAGNOSIS — Z90.49 S/P LAPAROSCOPIC CHOLECYSTECTOMY: Primary | ICD-10-CM

## 2021-10-29 PROCEDURE — 99024 POSTOP FOLLOW-UP VISIT: CPT | Performed by: STUDENT IN AN ORGANIZED HEALTH CARE EDUCATION/TRAINING PROGRAM

## 2021-10-29 NOTE — PROGRESS NOTES
Post Operative Visit Note       Active Problems  1. S/P laparoscopic cholecystectomy         Chief Complaint   Patient presents with:  Post-Op: PO lap melinda 10/21 w [place]- Pt. states little pain on the right  side especially when inhaling.  Pt. denies drainag c antibody,EGD to evaluate,no further colonoscopy indicated   • HYSTERECTOMY  1994    total   • LUMPECTOMY RIGHT  2001    dcis   • NEEDLE BIOPSY RIGHT  2001   • OTHER      left elbow   • OTHER  2/8/17    Interstim stage I   • OTHER SURGICAL HISTORY  04/24/ MOUTH BEFORE BREAKFAST, Disp: 90 tablet, Rfl: 0  •  FENOFIBRATE 160 MG Oral Tab, TAKE 1 TABLET(160 MG) BY MOUTH EVERY DAY, Disp: 90 tablet, Rfl: 0  •  citalopram hydrobromide 40 MG Oral Tab, Take 40 mg by mouth daily. , Disp: , Rfl:   •  LORazepam 1 MG Oral Palpations: Abdomen is soft. There is no mass. Tenderness: There is no abdominal tenderness. There is no guarding or rebound. Hernia: No hernia is present. Comments: The abdomen is soft, nontender, and nondistended.   No rebound or guardi file.    Taran Rey PA-C

## 2021-11-04 ENCOUNTER — LAB ENCOUNTER (OUTPATIENT)
Dept: LAB | Age: 71
End: 2021-11-04
Attending: NURSE PRACTITIONER
Payer: MEDICARE

## 2021-11-04 DIAGNOSIS — K80.20 CALCULUS OF GALLBLADDER WITHOUT CHOLECYSTITIS WITHOUT OBSTRUCTION: ICD-10-CM

## 2021-11-04 DIAGNOSIS — E78.00 PURE HYPERCHOLESTEROLEMIA: ICD-10-CM

## 2021-11-04 DIAGNOSIS — K85.90 ACUTE PANCREATITIS, UNSPECIFIED COMPLICATION STATUS, UNSPECIFIED PANCREATITIS TYPE: ICD-10-CM

## 2021-11-04 DIAGNOSIS — I26.09 OTHER ACUTE PULMONARY EMBOLISM WITH ACUTE COR PULMONALE (HCC): ICD-10-CM

## 2021-11-04 DIAGNOSIS — N28.9 RENAL INSUFFICIENCY: ICD-10-CM

## 2021-11-04 DIAGNOSIS — R79.89 ELEVATED LFTS: ICD-10-CM

## 2021-11-04 PROCEDURE — 80053 COMPREHEN METABOLIC PANEL: CPT

## 2021-11-04 PROCEDURE — 36415 COLL VENOUS BLD VENIPUNCTURE: CPT

## 2021-11-04 PROCEDURE — 80061 LIPID PANEL: CPT

## 2021-11-27 RX ORDER — FENOFIBRATE 160 MG/1
TABLET ORAL
Qty: 90 TABLET | Refills: 0 | Status: SHIPPED | OUTPATIENT
Start: 2021-11-27

## 2021-12-03 RX ORDER — FENOFIBRATE 160 MG/1
TABLET ORAL
Qty: 90 TABLET | Refills: 0 | OUTPATIENT
Start: 2021-12-03

## 2022-02-03 ENCOUNTER — TELEPHONE (OUTPATIENT)
Dept: INTERNAL MEDICINE CLINIC | Facility: CLINIC | Age: 72
End: 2022-02-03

## 2022-02-03 NOTE — TELEPHONE ENCOUNTER
Orders to [facility]  Pt aware to get labs done no sooner than 2 weeks prior to the appt. Pt aware to fast.  No call back required.   Future Appointments   Date Time Provider Ruben Sheppard   6/3/2022 10:00 AM Celi Castillo MD [facility]

## 2022-03-02 ENCOUNTER — PATIENT MESSAGE (OUTPATIENT)
Dept: INTERNAL MEDICINE CLINIC | Facility: CLINIC | Age: 72
End: 2022-03-02

## 2022-03-02 ENCOUNTER — APPOINTMENT (OUTPATIENT)
Dept: CT IMAGING | Facility: HOSPITAL | Age: 72
End: 2022-03-02
Attending: EMERGENCY MEDICINE
Payer: MEDICARE

## 2022-03-02 ENCOUNTER — HOSPITAL ENCOUNTER (EMERGENCY)
Facility: HOSPITAL | Age: 72
Discharge: HOME | End: 2022-03-02
Attending: EMERGENCY MEDICINE
Payer: MEDICARE

## 2022-03-02 VITALS
RESPIRATION RATE: 14 BRPM | TEMPERATURE: 97 F | HEIGHT: 64 IN | BODY MASS INDEX: 32.78 KG/M2 | WEIGHT: 192 LBS | DIASTOLIC BLOOD PRESSURE: 69 MMHG | SYSTOLIC BLOOD PRESSURE: 134 MMHG | OXYGEN SATURATION: 98 % | HEART RATE: 70 BPM

## 2022-03-02 DIAGNOSIS — R06.00 DYSPNEA ON EXERTION: Primary | ICD-10-CM

## 2022-03-02 LAB
ALBUMIN SERPL-MCNC: 3.5 G/DL (ref 3.4–5)
ALBUMIN/GLOB SERPL: 1.1 {RATIO} (ref 1–2)
ALP LIVER SERPL-CCNC: 59 U/L
ALT SERPL-CCNC: 21 U/L
ANION GAP SERPL CALC-SCNC: 2 MMOL/L (ref 0–18)
AST SERPL-CCNC: 18 U/L (ref 15–37)
BASOPHILS # BLD AUTO: 0.05 X10(3) UL (ref 0–0.2)
BASOPHILS NFR BLD AUTO: 0.9 %
BILIRUB SERPL-MCNC: 0.2 MG/DL (ref 0.1–2)
BUN BLD-MCNC: 31 MG/DL (ref 7–18)
CHLORIDE SERPL-SCNC: 107 MMOL/L (ref 98–112)
CO2 SERPL-SCNC: 27 MMOL/L (ref 21–32)
CREAT BLD-MCNC: 1.07 MG/DL
EOSINOPHIL # BLD AUTO: 0.21 X10(3) UL (ref 0–0.7)
EOSINOPHIL NFR BLD AUTO: 3.7 %
ERYTHROCYTE [DISTWIDTH] IN BLOOD BY AUTOMATED COUNT: 12.9 %
GLOBULIN PLAS-MCNC: 3.3 G/DL (ref 2.8–4.4)
GLUCOSE BLD-MCNC: 102 MG/DL (ref 70–99)
HCT VFR BLD AUTO: 35.9 %
HGB BLD-MCNC: 11.7 G/DL
IMM GRANULOCYTES # BLD AUTO: 0.01 X10(3) UL (ref 0–1)
IMM GRANULOCYTES NFR BLD: 0.2 %
INR BLD: 1 (ref 0.8–1.2)
LYMPHOCYTES # BLD AUTO: 2.06 X10(3) UL (ref 1–4)
LYMPHOCYTES NFR BLD AUTO: 36.3 %
MCH RBC QN AUTO: 30.5 PG (ref 26–34)
MCHC RBC AUTO-ENTMCNC: 32.6 G/DL (ref 31–37)
MCV RBC AUTO: 93.5 FL
MONOCYTES # BLD AUTO: 0.58 X10(3) UL (ref 0.1–1)
MONOCYTES NFR BLD AUTO: 10.2 %
NEUTROPHILS # BLD AUTO: 2.76 X10 (3) UL (ref 1.5–7.7)
NEUTROPHILS # BLD AUTO: 2.76 X10(3) UL (ref 1.5–7.7)
NEUTROPHILS NFR BLD AUTO: 48.7 %
OSMOLALITY SERPL CALC.SUM OF ELEC: 289 MOSM/KG (ref 275–295)
PLATELET # BLD AUTO: 259 10(3)UL (ref 150–450)
POTASSIUM SERPL-SCNC: 3.8 MMOL/L (ref 3.5–5.1)
PROT SERPL-MCNC: 6.8 G/DL (ref 6.4–8.2)
PROTHROMBIN TIME: 13.2 SECONDS (ref 11.6–14.8)
RBC # BLD AUTO: 3.84 X10(6)UL
SARS-COV-2 RNA RESP QL NAA+PROBE: NOT DETECTED
TROPONIN I HIGH SENSITIVITY: <3 NG/L
WBC # BLD AUTO: 5.7 X10(3) UL (ref 4–11)

## 2022-03-02 PROCEDURE — 93010 ELECTROCARDIOGRAM REPORT: CPT

## 2022-03-02 PROCEDURE — 93005 ELECTROCARDIOGRAM TRACING: CPT

## 2022-03-02 PROCEDURE — 99284 EMERGENCY DEPT VISIT MOD MDM: CPT

## 2022-03-02 PROCEDURE — 36415 COLL VENOUS BLD VENIPUNCTURE: CPT

## 2022-03-02 PROCEDURE — 85610 PROTHROMBIN TIME: CPT | Performed by: EMERGENCY MEDICINE

## 2022-03-02 PROCEDURE — 71260 CT THORAX DX C+: CPT | Performed by: EMERGENCY MEDICINE

## 2022-03-02 PROCEDURE — 80053 COMPREHEN METABOLIC PANEL: CPT | Performed by: EMERGENCY MEDICINE

## 2022-03-02 PROCEDURE — 84484 ASSAY OF TROPONIN QUANT: CPT | Performed by: EMERGENCY MEDICINE

## 2022-03-02 PROCEDURE — 85025 COMPLETE CBC W/AUTO DIFF WBC: CPT | Performed by: EMERGENCY MEDICINE

## 2022-03-02 RX ORDER — IOHEXOL 350 MG/ML
100 INJECTION, SOLUTION INTRAVENOUS
Status: COMPLETED | OUTPATIENT
Start: 2022-03-02 | End: 2022-03-02

## 2022-03-02 NOTE — TELEPHONE ENCOUNTER
Pt aware and agreeable to go to the ED. Pt confirmed is not on blood thinners and no longer sees Dr. Placido Lopez.

## 2022-03-03 RX ORDER — LEVOTHYROXINE SODIUM 88 UG/1
TABLET ORAL
Qty: 90 TABLET | Refills: 0 | Status: SHIPPED | OUTPATIENT
Start: 2022-03-03

## 2022-03-03 RX ORDER — FENOFIBRATE 160 MG/1
TABLET ORAL
Qty: 90 TABLET | Refills: 0 | Status: SHIPPED | OUTPATIENT
Start: 2022-03-03

## 2022-03-03 NOTE — TELEPHONE ENCOUNTER
Future Appointments   Date Time Provider Ruben Sheppard   3/5/2022  9:00 AM PATTI Mcarthur [facility]

## 2022-03-03 NOTE — TELEPHONE ENCOUNTER
Last VISIT - 8/19/21 HFU    Last CPE - 6/18/21    Last REFILL -     FENOFIBRATE 160 MG Oral Tab 90 tablet 0 11/27/2021      Last LABS - 3/2/22 cbc, cmp 11/4/21 lipid    Future Appointments   Date Time Provider Ruben Sheppard   3/5/2022  9:00 AM PATTI Garcia [facility]   6/3/2022 10:00 AM Marianne Rain MD [facility]         Per PROTOCOL? PASSED    Please Approve or Deny.

## 2022-03-03 NOTE — TELEPHONE ENCOUNTER
Last VISIT - 8/19/21 HFU    Last CPE - 6/18/21 Well adult    Last REFILL -     LEVOTHYROXINE 88 MCG Oral Tab 90 tablet 0 9/7/2021     Last LABS - 3/3/22 cbc, cmp     Future Appointments   Date Time Provider Ruben Sheppard   3/5/2022  9:00 AM PATTI Mccain [facility]   6/3/2022 10:00 Zayda Santana MD [facility]       Per PROTOCOL? PASSED    Please Approve or Deny.

## 2022-03-03 NOTE — ED INITIAL ASSESSMENT (HPI)
Directed by primary MD to get CT scan. Pt states she has shortness of breath when she walks. At one point when walking pt states that he O2 dropped to 89% but came back up. Denies SOB currently, chest pain or cough. Pt has hx of bilateral PE last may. Pt is not longer on any blood thinners.

## 2022-03-04 ENCOUNTER — TELEPHONE (OUTPATIENT)
Dept: INTERNAL MEDICINE CLINIC | Facility: CLINIC | Age: 72
End: 2022-03-04

## 2022-03-04 LAB
ATRIAL RATE: 73 BPM
P AXIS: 55 DEGREES
P-R INTERVAL: 164 MS
Q-T INTERVAL: 396 MS
QRS DURATION: 90 MS
QTC CALCULATION (BEZET): 436 MS
R AXIS: 14 DEGREES
T AXIS: 22 DEGREES
VENTRICULAR RATE: 73 BPM

## 2022-03-05 ENCOUNTER — OFFICE VISIT (OUTPATIENT)
Dept: INTERNAL MEDICINE CLINIC | Facility: CLINIC | Age: 72
End: 2022-03-05
Payer: MEDICARE

## 2022-03-05 VITALS
TEMPERATURE: 96 F | HEIGHT: 64 IN | HEART RATE: 75 BPM | WEIGHT: 193 LBS | SYSTOLIC BLOOD PRESSURE: 124 MMHG | BODY MASS INDEX: 32.95 KG/M2 | OXYGEN SATURATION: 99 % | DIASTOLIC BLOOD PRESSURE: 68 MMHG

## 2022-03-05 DIAGNOSIS — R06.02 SOB (SHORTNESS OF BREATH) ON EXERTION: Primary | ICD-10-CM

## 2022-03-05 DIAGNOSIS — E78.00 PURE HYPERCHOLESTEROLEMIA: ICD-10-CM

## 2022-03-05 DIAGNOSIS — R91.8 PULMONARY NODULES: ICD-10-CM

## 2022-03-05 DIAGNOSIS — Z86.711 HISTORY OF PULMONARY EMBOLISM: ICD-10-CM

## 2022-03-05 PROCEDURE — 99214 OFFICE O/P EST MOD 30 MIN: CPT | Performed by: NURSE PRACTITIONER

## 2022-03-07 RX ORDER — FENOFIBRATE 160 MG/1
TABLET ORAL
Qty: 90 TABLET | Refills: 0 | OUTPATIENT
Start: 2022-03-07

## 2022-03-21 ENCOUNTER — HOSPITAL ENCOUNTER (OUTPATIENT)
Dept: CV DIAGNOSTICS | Age: 72
Discharge: HOME | End: 2022-03-21
Attending: NURSE PRACTITIONER
Payer: MEDICARE

## 2022-03-21 DIAGNOSIS — R06.02 SOB (SHORTNESS OF BREATH) ON EXERTION: ICD-10-CM

## 2022-03-21 PROCEDURE — 78452 HT MUSCLE IMAGE SPECT MULT: CPT | Performed by: NURSE PRACTITIONER

## 2022-03-21 PROCEDURE — 93017 CV STRESS TEST TRACING ONLY: CPT | Performed by: NURSE PRACTITIONER

## 2022-03-21 PROCEDURE — 93018 CV STRESS TEST I&R ONLY: CPT | Performed by: NURSE PRACTITIONER

## 2022-04-15 RX ORDER — CITALOPRAM 40 MG/1
TABLET ORAL
Qty: 90 TABLET | Refills: 0 | Status: SHIPPED | OUTPATIENT
Start: 2022-04-15

## 2022-04-15 NOTE — TELEPHONE ENCOUNTER
Last OV 3.5.22 w/ SD (ER f/up)   Last PE 6.18.21  Last REFILL No recent refill on file, listed at external reported  Last LABS 11.4.21 Lipid, CMP    Future Appointments   Date Time Provider Ruben Sheppard   6/3/2022 10:00 AM Amanda Abreu MD [facility]         Per PROTOCOL?  Not on protocol     Please Advise

## 2022-04-19 NOTE — TELEPHONE ENCOUNTER
Called pt to triage, [address] [place] [address].
Hold to discuss at apt
LOV-6/1/18 JL  FOV-6/5/19  JL  LAST RX-6/1/18 20 tabs 0 refills  LAST LABS-6/8/18  PER PROTOCOL-to provider
Pt called back. Pt states she takes 1/2 tab couple times/month for her anxiety. Pt reports relief when she takes med. JL- ok for refill? Please advise, thank you. Pt has appt 2/8 with SD when she can  script if appropriate.
Pt has apt today at 9:30am
Triage why she needs refill
pulse oximetry

## 2022-05-25 ENCOUNTER — LAB ENCOUNTER (OUTPATIENT)
Dept: LAB | Age: 72
End: 2022-05-25
Attending: INTERNAL MEDICINE
Payer: MEDICARE

## 2022-05-25 DIAGNOSIS — E78.00 PURE HYPERCHOLESTEROLEMIA: ICD-10-CM

## 2022-05-25 DIAGNOSIS — E03.9 HYPOTHYROIDISM (ACQUIRED): ICD-10-CM

## 2022-05-25 DIAGNOSIS — Z00.00 ROUTINE GENERAL MEDICAL EXAMINATION AT A HEALTH CARE FACILITY: ICD-10-CM

## 2022-05-25 LAB
CHOLEST SERPL-MCNC: 213 MG/DL (ref ?–200)
FASTING PATIENT LIPID ANSWER: YES
HDLC SERPL-MCNC: 65 MG/DL (ref 40–59)
LDLC SERPL CALC-MCNC: 129 MG/DL (ref ?–100)
NONHDLC SERPL-MCNC: 148 MG/DL (ref ?–130)
TRIGL SERPL-MCNC: 109 MG/DL (ref 30–149)
TSI SER-ACNC: 1.92 MIU/ML (ref 0.36–3.74)
VLDLC SERPL CALC-MCNC: 20 MG/DL (ref 0–30)

## 2022-05-25 PROCEDURE — 36415 COLL VENOUS BLD VENIPUNCTURE: CPT

## 2022-05-25 PROCEDURE — 84443 ASSAY THYROID STIM HORMONE: CPT

## 2022-05-25 PROCEDURE — 80061 LIPID PANEL: CPT

## 2022-05-25 RX ORDER — LORAZEPAM 1 MG/1
TABLET ORAL
Qty: 20 TABLET | Refills: 0 | Status: SHIPPED | OUTPATIENT
Start: 2022-05-25

## 2022-05-31 RX ORDER — LEVOTHYROXINE SODIUM 88 UG/1
TABLET ORAL
Qty: 90 TABLET | Refills: 0 | Status: SHIPPED | OUTPATIENT
Start: 2022-05-31

## 2022-05-31 NOTE — TELEPHONE ENCOUNTER
PASSED per protocol, refill sent.     Last PE: 6--JL-            Future Appointments   Date Time Provider [facility] Valarie   6/10/2022 11:00 AM PATTI Lala [facility]

## 2022-06-10 ENCOUNTER — OFFICE VISIT (OUTPATIENT)
Dept: INTERNAL MEDICINE CLINIC | Facility: CLINIC | Age: 72
End: 2022-06-10
Payer: MEDICARE

## 2022-06-10 ENCOUNTER — LAB ENCOUNTER (OUTPATIENT)
Dept: LAB | Age: 72
End: 2022-06-10
Attending: NURSE PRACTITIONER
Payer: MEDICARE

## 2022-06-10 VITALS
DIASTOLIC BLOOD PRESSURE: 74 MMHG | HEART RATE: 76 BPM | TEMPERATURE: 97 F | BODY MASS INDEX: 33.46 KG/M2 | SYSTOLIC BLOOD PRESSURE: 124 MMHG | HEIGHT: 64 IN | WEIGHT: 196 LBS

## 2022-06-10 DIAGNOSIS — E78.00 PURE HYPERCHOLESTEROLEMIA: ICD-10-CM

## 2022-06-10 DIAGNOSIS — F41.1 ANXIETY STATE: ICD-10-CM

## 2022-06-10 DIAGNOSIS — E55.9 VITAMIN D DEFICIENCY: ICD-10-CM

## 2022-06-10 DIAGNOSIS — Z86.711 HISTORY OF PULMONARY EMBOLISM: ICD-10-CM

## 2022-06-10 DIAGNOSIS — M17.12 PRIMARY OSTEOARTHRITIS OF LEFT KNEE: ICD-10-CM

## 2022-06-10 DIAGNOSIS — M85.80 OSTEOPENIA, UNSPECIFIED LOCATION: ICD-10-CM

## 2022-06-10 DIAGNOSIS — I72.8 CELIAC ARTERY DILATATION (HCC): ICD-10-CM

## 2022-06-10 DIAGNOSIS — R53.83 FATIGUE, UNSPECIFIED TYPE: ICD-10-CM

## 2022-06-10 DIAGNOSIS — R91.8 PULMONARY NODULES: ICD-10-CM

## 2022-06-10 DIAGNOSIS — Z00.00 ENCOUNTER FOR ANNUAL HEALTH EXAMINATION: Primary | ICD-10-CM

## 2022-06-10 DIAGNOSIS — L98.9 SKIN LESION: ICD-10-CM

## 2022-06-10 DIAGNOSIS — Z87.19 HISTORY OF ACUTE PANCREATITIS: ICD-10-CM

## 2022-06-10 DIAGNOSIS — D35.02 ADENOMA OF LEFT ADRENAL GLAND: ICD-10-CM

## 2022-06-10 DIAGNOSIS — K59.01 SLOW TRANSIT CONSTIPATION: ICD-10-CM

## 2022-06-10 DIAGNOSIS — D64.9 NORMOCYTIC ANEMIA: ICD-10-CM

## 2022-06-10 DIAGNOSIS — Z12.31 ENCOUNTER FOR SCREENING MAMMOGRAM FOR MALIGNANT NEOPLASM OF BREAST: ICD-10-CM

## 2022-06-10 DIAGNOSIS — F32.A DEPRESSION, UNSPECIFIED DEPRESSION TYPE: ICD-10-CM

## 2022-06-10 DIAGNOSIS — E03.9 HYPOTHYROIDISM (ACQUIRED): ICD-10-CM

## 2022-06-10 DIAGNOSIS — I74.8 THROMBOSIS OF SPLENIC ARTERY (HCC): ICD-10-CM

## 2022-06-10 LAB
ALBUMIN SERPL-MCNC: 4 G/DL (ref 3.4–5)
ALBUMIN/GLOB SERPL: 1.2 {RATIO} (ref 1–2)
ALP LIVER SERPL-CCNC: 67 U/L
ALT SERPL-CCNC: 23 U/L
ANION GAP SERPL CALC-SCNC: 4 MMOL/L (ref 0–18)
AST SERPL-CCNC: 20 U/L (ref 15–37)
BASOPHILS # BLD AUTO: 0.06 X10(3) UL (ref 0–0.2)
BASOPHILS NFR BLD AUTO: 1 %
BILIRUB SERPL-MCNC: 0.3 MG/DL (ref 0.1–2)
BUN BLD-MCNC: 23 MG/DL (ref 7–18)
CALCIUM BLD-MCNC: 9.5 MG/DL (ref 8.5–10.1)
CHLORIDE SERPL-SCNC: 108 MMOL/L (ref 98–112)
CO2 SERPL-SCNC: 27 MMOL/L (ref 21–32)
CREAT BLD-MCNC: 0.97 MG/DL
EOSINOPHIL # BLD AUTO: 0.24 X10(3) UL (ref 0–0.7)
EOSINOPHIL NFR BLD AUTO: 3.9 %
ERYTHROCYTE [DISTWIDTH] IN BLOOD BY AUTOMATED COUNT: 12.7 %
FASTING STATUS PATIENT QL REPORTED: NO
GLOBULIN PLAS-MCNC: 3.4 G/DL (ref 2.8–4.4)
GLUCOSE BLD-MCNC: 86 MG/DL (ref 70–99)
HCT VFR BLD AUTO: 39.5 %
HGB BLD-MCNC: 12.6 G/DL
IMM GRANULOCYTES # BLD AUTO: 0.01 X10(3) UL (ref 0–1)
IMM GRANULOCYTES NFR BLD: 0.2 %
LYMPHOCYTES # BLD AUTO: 1.87 X10(3) UL (ref 1–4)
LYMPHOCYTES NFR BLD AUTO: 30.5 %
MCH RBC QN AUTO: 30.5 PG (ref 26–34)
MCHC RBC AUTO-ENTMCNC: 31.9 G/DL (ref 31–37)
MCV RBC AUTO: 95.6 FL
MONOCYTES # BLD AUTO: 0.66 X10(3) UL (ref 0.1–1)
MONOCYTES NFR BLD AUTO: 10.8 %
NEUTROPHILS # BLD AUTO: 3.29 X10 (3) UL (ref 1.5–7.7)
NEUTROPHILS # BLD AUTO: 3.29 X10(3) UL (ref 1.5–7.7)
NEUTROPHILS NFR BLD AUTO: 53.6 %
OSMOLALITY SERPL CALC.SUM OF ELEC: 291 MOSM/KG (ref 275–295)
PLATELET # BLD AUTO: 322 10(3)UL (ref 150–450)
POTASSIUM SERPL-SCNC: 4.7 MMOL/L (ref 3.5–5.1)
PROT SERPL-MCNC: 7.4 G/DL (ref 6.4–8.2)
RBC # BLD AUTO: 4.13 X10(6)UL
SODIUM SERPL-SCNC: 139 MMOL/L (ref 136–145)
VIT B12 SERPL-MCNC: 477 PG/ML (ref 193–986)
VIT D+METAB SERPL-MCNC: 23 NG/ML (ref 30–100)
WBC # BLD AUTO: 6.1 X10(3) UL (ref 4–11)

## 2022-06-10 PROCEDURE — 80053 COMPREHEN METABOLIC PANEL: CPT

## 2022-06-10 PROCEDURE — 82306 VITAMIN D 25 HYDROXY: CPT

## 2022-06-10 PROCEDURE — 85025 COMPLETE CBC W/AUTO DIFF WBC: CPT

## 2022-06-10 PROCEDURE — 82607 VITAMIN B-12: CPT

## 2022-06-10 PROCEDURE — 36415 COLL VENOUS BLD VENIPUNCTURE: CPT

## 2022-06-12 DIAGNOSIS — E55.9 VITAMIN D DEFICIENCY: Primary | ICD-10-CM

## 2022-06-12 RX ORDER — ERGOCALCIFEROL 1.25 MG/1
50000 CAPSULE ORAL WEEKLY
Qty: 12 CAPSULE | Refills: 0 | Status: SHIPPED | OUTPATIENT
Start: 2022-06-12 | End: 2022-07-12

## 2022-06-17 ENCOUNTER — HOSPITAL ENCOUNTER (OUTPATIENT)
Dept: CT IMAGING | Age: 72
Discharge: HOME | End: 2022-06-17
Attending: NURSE PRACTITIONER
Payer: MEDICARE

## 2022-06-17 DIAGNOSIS — I72.8 CELIAC ARTERY DILATATION (HCC): ICD-10-CM

## 2022-06-17 DIAGNOSIS — I74.8 THROMBOSIS OF SPLENIC ARTERY (HCC): ICD-10-CM

## 2022-06-17 PROCEDURE — 74174 CTA ABD&PLVS W/CONTRAST: CPT | Performed by: NURSE PRACTITIONER

## 2022-06-17 RX ORDER — IOHEXOL 350 MG/ML
100 INJECTION, SOLUTION INTRAVENOUS
Status: COMPLETED | OUTPATIENT
Start: 2022-06-17 | End: 2022-06-17

## 2022-06-17 RX ADMIN — IOHEXOL 100 ML: 350 INJECTION, SOLUTION INTRAVENOUS at 16:55:00

## 2022-06-23 RX ORDER — LEVOTHYROXINE SODIUM 88 UG/1
TABLET ORAL
Qty: 90 TABLET | Refills: 0 | OUTPATIENT
Start: 2022-06-23

## 2022-06-23 RX ORDER — FENOFIBRATE 160 MG/1
TABLET ORAL
Qty: 90 TABLET | Refills: 0 | Status: SHIPPED | OUTPATIENT
Start: 2022-06-23

## 2022-06-23 NOTE — TELEPHONE ENCOUNTER
PASSED per protocol, refill sent.   Last PE 6.10.22  Future Appointments   Date Time Provider Ruben Sheppard   6/30/2022  3:20 PM PF ROSA RM1 PF NISHA Alves

## 2022-06-29 PROBLEM — C44.712 BASAL CELL CARCINOMA (BCC) OF SKIN OF RIGHT LOWER EXTREMITY INCLUDING HIP: Status: ACTIVE | Noted: 2022-06-29

## 2022-06-30 ENCOUNTER — TELEPHONE (OUTPATIENT)
Dept: INTERNAL MEDICINE CLINIC | Facility: CLINIC | Age: 72
End: 2022-06-30

## 2022-06-30 ENCOUNTER — HOSPITAL ENCOUNTER (OUTPATIENT)
Dept: MAMMOGRAPHY | Age: 72
Discharge: HOME | End: 2022-06-30
Attending: NURSE PRACTITIONER
Payer: MEDICARE

## 2022-06-30 DIAGNOSIS — Z12.31 ENCOUNTER FOR SCREENING MAMMOGRAM FOR MALIGNANT NEOPLASM OF BREAST: ICD-10-CM

## 2022-06-30 PROCEDURE — 77067 SCR MAMMO BI INCL CAD: CPT | Performed by: NURSE PRACTITIONER

## 2022-06-30 PROCEDURE — 77063 BREAST TOMOSYNTHESIS BI: CPT | Performed by: NURSE PRACTITIONER

## 2022-07-21 ENCOUNTER — TELEPHONE (OUTPATIENT)
Dept: ORTHOPEDICS CLINIC | Facility: CLINIC | Age: 72
End: 2022-07-21

## 2022-07-21 ENCOUNTER — APPOINTMENT (OUTPATIENT)
Dept: GENERAL RADIOLOGY | Age: 72
End: 2022-07-21
Attending: NURSE PRACTITIONER
Payer: MEDICARE

## 2022-07-21 ENCOUNTER — HOSPITAL ENCOUNTER (OUTPATIENT)
Age: 72
Discharge: HOME | End: 2022-07-21
Payer: MEDICARE

## 2022-07-21 VITALS
WEIGHT: 196 LBS | SYSTOLIC BLOOD PRESSURE: 132 MMHG | RESPIRATION RATE: 16 BRPM | TEMPERATURE: 98 F | OXYGEN SATURATION: 100 % | BODY MASS INDEX: 33.46 KG/M2 | DIASTOLIC BLOOD PRESSURE: 78 MMHG | HEIGHT: 64 IN | HEART RATE: 80 BPM

## 2022-07-21 DIAGNOSIS — S52.125A CLOSED NONDISPLACED FRACTURE OF HEAD OF LEFT RADIUS, INITIAL ENCOUNTER: Primary | ICD-10-CM

## 2022-07-21 PROCEDURE — A4565 SLINGS: HCPCS | Performed by: NURSE PRACTITIONER

## 2022-07-21 PROCEDURE — 29505 APPLICATION LONG LEG SPLINT: CPT | Performed by: NURSE PRACTITIONER

## 2022-07-21 PROCEDURE — 73080 X-RAY EXAM OF ELBOW: CPT | Performed by: NURSE PRACTITIONER

## 2022-07-21 PROCEDURE — 99213 OFFICE O/P EST LOW 20 MIN: CPT | Performed by: NURSE PRACTITIONER

## 2022-07-21 NOTE — TELEPHONE ENCOUNTER
Patient is seen at the immediate care with a LT Elbow Fx, would like to be seen tomorrow, offered an appt on Monday but declined. Please advise.     Patient can be reached at [phone]

## 2022-07-21 NOTE — TELEPHONE ENCOUNTER
Please add to Sincer schedule.  Thank you    RAGHAVENDRA Taveras   Happy to accommodate tomorrow, thanks

## 2022-07-21 NOTE — TELEPHONE ENCOUNTER
Last Imaging  XR ELBOW, COMPLETE (MIN 3 VIEWS), LEFT (CPT=73080)  Narrative: PROCEDURE:  XR ELBOW, COMPLETE (MIN 3 VIEWS), LEFT (CPT=73080)     LOCATION:  [facility]       TECHNIQUE:  Three views were obtained. COMPARISON:  None. INDICATIONS:  pain/injury     PATIENT STATED HISTORY: (As transcribed by Technologist)  Patient states she fell last night on her driveway and landed on her left elbow. Patient having pain to left posterior elbow. Patient unable to fully straighten left elbow. FINDINGS:    Subtle nondisplaced radial head fracture along the radial aspect. No other fracture or bony deformity. Lateral view shows clearly elevated anterior posterior fat pad consistent with joint fluid. Minimal posterior swelling. Impression: CONCLUSION:  Nondisplaced radial head fracture with hemarthrosis. Dictated by (CST): Ashanti Ryan MD on 7/21/2022 at 9:51 AM       Finalized by (CST): Ashanti Ryan MD on 7/21/2022 at 9:52 AM          No future appointments.

## 2022-07-22 ENCOUNTER — OFFICE VISIT (OUTPATIENT)
Dept: ORTHOPEDICS CLINIC | Facility: CLINIC | Age: 72
End: 2022-07-22
Payer: MEDICARE

## 2022-07-22 DIAGNOSIS — S52.125A CLOSED NONDISPLACED FRACTURE OF HEAD OF LEFT RADIUS, INITIAL ENCOUNTER: Primary | ICD-10-CM

## 2022-07-22 PROCEDURE — 99213 OFFICE O/P EST LOW 20 MIN: CPT | Performed by: PHYSICIAN ASSISTANT

## 2022-07-22 PROCEDURE — 1125F AMNT PAIN NOTED PAIN PRSNT: CPT | Performed by: PHYSICIAN ASSISTANT

## 2022-07-25 ENCOUNTER — TELEPHONE (OUTPATIENT)
Dept: INTERNAL MEDICINE CLINIC | Facility: CLINIC | Age: 72
End: 2022-07-25

## 2022-07-25 NOTE — TELEPHONE ENCOUNTER
Spoke to pt. Pt said that she has been on citalopram and bupropion for awhile now. She was on an antidepressant in the 80's that caused her to fall when she would make sudden movements. At that time, her med was switched, but this would still occur very rarely. In fall of 2020, she was walking her dog and her dog pulled her. Because of the sudden movement, she fell and bruised her hip. A year ago in May, she was golfing. When she swung, the movement caused her to fall. She injured her knee badly. Last Wednesday, she moved quickly and fell and fx her elbow. Pt wanting to discuss this further with TB. She said that she has never seen TB before, was previously a JL pt. She is looking forward to meeting TB. Moved appt up to this Friday. Advised to be seen in UC/ER if this occurs before appt. Pt stated understanding and agreed to plan. FYI to TB.

## 2022-07-25 NOTE — TELEPHONE ENCOUNTER
She is coming in for falls, balance issues, mood. I never met her, needs 40 min.  Need to reschedule

## 2022-07-29 ENCOUNTER — OFFICE VISIT (OUTPATIENT)
Dept: INTERNAL MEDICINE CLINIC | Facility: CLINIC | Age: 72
End: 2022-07-29
Payer: MEDICARE

## 2022-07-29 VITALS
SYSTOLIC BLOOD PRESSURE: 134 MMHG | TEMPERATURE: 97 F | HEIGHT: 63.78 IN | OXYGEN SATURATION: 96 % | WEIGHT: 192.81 LBS | DIASTOLIC BLOOD PRESSURE: 86 MMHG | BODY MASS INDEX: 33.33 KG/M2 | HEART RATE: 92 BPM | RESPIRATION RATE: 16 BRPM

## 2022-07-29 DIAGNOSIS — R26.89 BALANCE PROBLEM: ICD-10-CM

## 2022-07-29 DIAGNOSIS — R29.6 FREQUENT FALLS: Primary | ICD-10-CM

## 2022-07-29 DIAGNOSIS — F41.9 ANXIETY AND DEPRESSION: ICD-10-CM

## 2022-07-29 DIAGNOSIS — S42.402A CLOSED FRACTURE OF LEFT ELBOW, INITIAL ENCOUNTER: ICD-10-CM

## 2022-07-29 DIAGNOSIS — F32.A ANXIETY AND DEPRESSION: ICD-10-CM

## 2022-07-29 PROCEDURE — 1126F AMNT PAIN NOTED NONE PRSNT: CPT | Performed by: INTERNAL MEDICINE

## 2022-07-29 PROCEDURE — 99214 OFFICE O/P EST MOD 30 MIN: CPT | Performed by: INTERNAL MEDICINE

## 2022-07-29 RX ORDER — BUPROPION HYDROCHLORIDE 150 MG/1
150 TABLET ORAL DAILY
Qty: 30 TABLET | Refills: 1 | Status: SHIPPED | OUTPATIENT
Start: 2022-07-29

## 2022-08-02 NOTE — TELEPHONE ENCOUNTER
Latest RX: FENOFIBRATE 160MG TABLETS 90 tabs 0 refills on 5/12/20    Per protocol, passed.  Rx sent Solaraze Counseling:  I discussed with the patient the risks of Solaraze including but not limited to erythema, scaling, itching, weeping, crusting, and pain.

## 2022-08-08 ENCOUNTER — MED REC SCAN ONLY (OUTPATIENT)
Dept: ORTHOPEDICS CLINIC | Facility: CLINIC | Age: 72
End: 2022-08-08

## 2022-08-17 ENCOUNTER — TELEPHONE (OUTPATIENT)
Dept: INTERNAL MEDICINE CLINIC | Facility: CLINIC | Age: 72
End: 2022-08-17

## 2022-08-17 NOTE — TELEPHONE ENCOUNTER
Received biopsy results from [facility]. Put in TB bin for review.

## 2022-09-07 ENCOUNTER — MED REC SCAN ONLY (OUTPATIENT)
Dept: ORTHOPEDICS CLINIC | Facility: CLINIC | Age: 72
End: 2022-09-07

## 2022-09-12 ENCOUNTER — OFFICE VISIT (OUTPATIENT)
Dept: ORTHOPEDICS CLINIC | Facility: CLINIC | Age: 72
End: 2022-09-12
Payer: MEDICARE

## 2022-09-12 VITALS — HEIGHT: 64 IN | BODY MASS INDEX: 32.78 KG/M2 | WEIGHT: 192 LBS

## 2022-09-12 DIAGNOSIS — S52.125D CLOSED NONDISPLACED FRACTURE OF HEAD OF LEFT RADIUS WITH ROUTINE HEALING, SUBSEQUENT ENCOUNTER: Primary | ICD-10-CM

## 2022-09-12 PROCEDURE — 99213 OFFICE O/P EST LOW 20 MIN: CPT | Performed by: PHYSICIAN ASSISTANT

## 2022-09-12 PROCEDURE — 1125F AMNT PAIN NOTED PAIN PRSNT: CPT | Performed by: PHYSICIAN ASSISTANT

## 2022-09-19 RX ORDER — FENOFIBRATE 160 MG/1
TABLET ORAL
Qty: 90 TABLET | Refills: 0 | Status: SHIPPED | OUTPATIENT
Start: 2022-09-19

## 2022-09-22 RX ORDER — LEVOTHYROXINE SODIUM 88 UG/1
TABLET ORAL
Qty: 90 TABLET | Refills: 0 | Status: SHIPPED | OUTPATIENT
Start: 2022-09-22

## 2022-10-05 ENCOUNTER — TELEMEDICINE (OUTPATIENT)
Dept: INTERNAL MEDICINE CLINIC | Facility: CLINIC | Age: 72
End: 2022-10-05
Payer: MEDICARE

## 2022-10-05 ENCOUNTER — MED REC SCAN ONLY (OUTPATIENT)
Dept: ORTHOPEDICS CLINIC | Facility: CLINIC | Age: 72
End: 2022-10-05

## 2022-10-05 DIAGNOSIS — F41.9 ANXIETY AND DEPRESSION: ICD-10-CM

## 2022-10-05 DIAGNOSIS — Z91.81 PERSONAL HISTORY OF FALL: Primary | ICD-10-CM

## 2022-10-05 DIAGNOSIS — F32.A ANXIETY AND DEPRESSION: ICD-10-CM

## 2022-10-05 PROCEDURE — 99441 PHONE E/M BY PHYS 5-10 MIN: CPT | Performed by: INTERNAL MEDICINE

## 2022-10-05 NOTE — PROGRESS NOTES
Due to COVID-19 ACTION PLAN, the patient's office visit was converted to a phone visit with informed patient consent. Time Spent: 6 min    Subjective     HPI:   Shirley Bryant is a 66year old female who presents for follow up on falls, twitching, mood since coming off the bupropion. Patient says she feels great, no more falls/twitching. She felt better within a week of completely stopping the Bupropion. Luckily the mood did not change. She is taking citalopram 40mg daily and anxiety and depression symptoms well controlled. She did do PT for balance and she feels it really helped. No Further Nursing Notes to Review            REVIEW OF SYSTEMS:  No fevers. No cough. No n/v  No other complaints today. Physical Exam:  Pleasant  Speaking in full sentences, no labored breathing  Mood is normal, happy        Assessment    Diagnoses and all orders for this visit:    Personal history of fall and twitching- doing well off the Bupropion, it seemed to cause myoclonus symptoms for her. She did PT which has helped her with balance as well. Anxiety and depression- doing well on citalopram alone, CPM         Return if symptoms worsen or fail to improve. Fauzia Ayoub MD    Shirley Bryant understands phone evaluation is not a substitute for face-to-face examination or emergency care. Patient advised to go to ER or call 911 for worsening symptoms or acute distress. Please note that the following visit was completed using two-way, real-time interactive audio communication. This has been done in good yulisa to provide continuity of care in the best interest of the provider-patient relationship, due to the on-going public health crisis/national emergency and because of restrictions of visitation. There are limitations of this visit as no physical exam could be performed. Every conscious effort was taken to allow for sufficient and adequate time.   This billing visit was spent on reviewing labs, medications, radiology tests and decision making. Appropriate medical decision-making and tests are ordered as detailed in the plan of care above.

## 2022-11-15 ENCOUNTER — HOSPITAL ENCOUNTER (OUTPATIENT)
Dept: GENERAL RADIOLOGY | Age: 72
Discharge: HOME | End: 2022-11-15
Attending: ORTHOPAEDIC SURGERY
Payer: MEDICARE

## 2022-11-15 DIAGNOSIS — M25.561 RIGHT KNEE PAIN, UNSPECIFIED CHRONICITY: ICD-10-CM

## 2022-11-15 PROCEDURE — 73564 X-RAY EXAM KNEE 4 OR MORE: CPT | Performed by: ORTHOPAEDIC SURGERY

## 2022-11-16 ENCOUNTER — OFFICE VISIT (OUTPATIENT)
Dept: ORTHOPEDICS CLINIC | Facility: CLINIC | Age: 72
End: 2022-11-16
Payer: MEDICARE

## 2022-11-16 VITALS — OXYGEN SATURATION: 99 % | HEART RATE: 78 BPM

## 2022-11-16 DIAGNOSIS — M23.91 INTERNAL DERANGEMENT OF RIGHT KNEE: Primary | ICD-10-CM

## 2022-11-16 PROCEDURE — 99214 OFFICE O/P EST MOD 30 MIN: CPT | Performed by: ORTHOPAEDIC SURGERY

## 2022-11-17 ENCOUNTER — TELEPHONE (OUTPATIENT)
Dept: ORTHOPEDICS CLINIC | Facility: CLINIC | Age: 72
End: 2022-11-17

## 2022-11-17 DIAGNOSIS — M23.91 INTERNAL DERANGEMENT OF RIGHT KNEE: Primary | ICD-10-CM

## 2022-11-17 NOTE — TELEPHONE ENCOUNTER
Patient thought that Dr. Germain Suresh was going to send over a refill on Meloxicam, directly following her appointment with him yesterday. The [facility], on file, does not have the request.  Can this please be called in for her?

## 2022-11-18 ENCOUNTER — PATIENT MESSAGE (OUTPATIENT)
Dept: ORTHOPEDICS CLINIC | Facility: CLINIC | Age: 72
End: 2022-11-18

## 2022-11-18 RX ORDER — MELOXICAM 7.5 MG/1
7.5 TABLET ORAL
Qty: 30 TABLET | Refills: 0 | Status: SHIPPED | OUTPATIENT
Start: 2022-11-18

## 2022-11-21 RX ORDER — CITALOPRAM 40 MG/1
40 TABLET ORAL DAILY
Qty: 90 TABLET | Refills: 0 | Status: SHIPPED | OUTPATIENT
Start: 2022-11-21

## 2022-11-29 ENCOUNTER — HOSPITAL ENCOUNTER (OUTPATIENT)
Dept: MRI IMAGING | Age: 72
Discharge: HOME | End: 2022-11-29
Attending: ORTHOPAEDIC SURGERY
Payer: MEDICARE

## 2022-11-29 DIAGNOSIS — M23.91 INTERNAL DERANGEMENT OF RIGHT KNEE: ICD-10-CM

## 2022-11-29 PROCEDURE — 73721 MRI JNT OF LWR EXTRE W/O DYE: CPT | Performed by: ORTHOPAEDIC SURGERY

## 2022-11-30 ENCOUNTER — OFFICE VISIT (OUTPATIENT)
Dept: ORTHOPEDICS CLINIC | Facility: CLINIC | Age: 72
End: 2022-11-30
Payer: MEDICARE

## 2022-11-30 VITALS — OXYGEN SATURATION: 96 % | HEART RATE: 78 BPM

## 2022-11-30 DIAGNOSIS — M25.561 RIGHT KNEE PAIN, UNSPECIFIED CHRONICITY: Primary | ICD-10-CM

## 2022-11-30 PROCEDURE — 20610 DRAIN/INJ JOINT/BURSA W/O US: CPT | Performed by: ORTHOPAEDIC SURGERY

## 2022-11-30 PROCEDURE — 99213 OFFICE O/P EST LOW 20 MIN: CPT | Performed by: ORTHOPAEDIC SURGERY

## 2022-11-30 RX ORDER — MELOXICAM 7.5 MG/1
7.5 TABLET ORAL DAILY
Qty: 30 TABLET | Refills: 0 | Status: SHIPPED | OUTPATIENT
Start: 2022-11-30

## 2022-11-30 RX ORDER — TRIAMCINOLONE ACETONIDE 40 MG/ML
40 INJECTION, SUSPENSION INTRA-ARTICULAR; INTRAMUSCULAR ONCE
Status: COMPLETED | OUTPATIENT
Start: 2022-11-30 | End: 2022-11-30

## 2022-11-30 RX ADMIN — TRIAMCINOLONE ACETONIDE 40 MG: 40 INJECTION, SUSPENSION INTRA-ARTICULAR; INTRAMUSCULAR at 11:16:00

## 2022-11-30 NOTE — PROGRESS NOTES
[facility] Progress Note    Subjective: Patient returns for her right knee. She had her MRI. Symptoms are unchanged. She is ambulating with a cane. She did go out to the grocery store over the weekend, notes that weightbearing made her pain significantly worse. Pain is along the medial aspect of the knee and into the posterior aspect. Objective: Patient is awake alert no distress. She ambulates with a cane. Passive hip flexion and 90 with external/internal rotation is symmetric to the left and painless. Imaging: MRI of the right knee personally viewed, independently interpreted and radiology report read. Demonstrates no acute pathology, meniscus intact, collateral and cruciate ligaments intact, area of cartilage loss on the posterior medial aspect of the patella with no subchondral edema, no swelling within the joint. Assessment/Plan: 64-year-old female with subacute left knee pain. MRI does not demonstrate any mechanical etiology or intra-articular pathology that would indicate a need for surgery. We did discuss continuing nonsurgical treatments for pain and inflammation. Steroid injection was performed to the knee today for both diagnostic and therapeutic purposes. Physical therapy has been ordered. Additionally meloxicam was refilled.     Jovanna Taylor MD, [address] [facility]  Phone [phone]  Fax [phone]

## 2022-11-30 NOTE — PROCEDURES
After informed consent, the patient's right knee was marked, locally anesthetized with skin refrigerant, prepped with topical antiseptic, and injected with a mixture of 1mL 40mg/mL Kenalog, 2mL 1% lidocaine and 2mL 0.5% marcaine through the inferolateral portal.  A band-aid was applied. The patient tolerated the procedure well.     Mamta Champion MD, [address] [place] [facility]  Phone [phone]  Fax [phone]

## 2022-12-05 ENCOUNTER — OFFICE VISIT (OUTPATIENT)
Dept: PHYSICAL THERAPY | Facility: HOSPITAL | Age: 72
End: 2022-12-05
Attending: ORTHOPAEDIC SURGERY
Payer: MEDICARE

## 2022-12-05 DIAGNOSIS — M25.561 RIGHT KNEE PAIN, UNSPECIFIED CHRONICITY: ICD-10-CM

## 2022-12-05 PROCEDURE — 97110 THERAPEUTIC EXERCISES: CPT

## 2022-12-05 PROCEDURE — 97161 PT EVAL LOW COMPLEX 20 MIN: CPT

## 2022-12-07 ENCOUNTER — OFFICE VISIT (OUTPATIENT)
Dept: PHYSICAL THERAPY | Facility: HOSPITAL | Age: 72
End: 2022-12-07
Attending: ORTHOPAEDIC SURGERY
Payer: MEDICARE

## 2022-12-07 PROCEDURE — 97140 MANUAL THERAPY 1/> REGIONS: CPT

## 2022-12-07 PROCEDURE — 97110 THERAPEUTIC EXERCISES: CPT

## 2022-12-07 NOTE — PROGRESS NOTES
Diagnosis:   Right knee pain, unspecified chronicity (M25.561)      Referring Provider: Vita Noriega  Date of Evaluation:    12/5/22    Precautions:  None Next MD visit:   none scheduled  Date of Surgery: n/a   Insurance Primary/Secondary: MEDICARE / Jesika Armstrong     # Auth Visits: 10 per POC            Subjective: Patient reports that her knee felt pretty good this morning but then after prolonged sitting it felt worse with standing and walking. Pain is more medial and if feels like something is stuck or \"catches\". Pain: 4/10      Objective:   Negative maria c's  Negative varus and valgus stress  TTP medial joint line  Patellar compression positive      Assessment: Patient did not have improvement in symptoms in walking following stationary bike, standing exercises, or LE muscle stretching. She did have improved symptoms following passive knee flexion mobilization w/ IR. This brought on her symptoms as flexion with ER did not. Added quadruped knee flexion stretch to HEP to simulate this at home. Goals:   (to be met in 10 visits)  Patient will demonstrate symmetrical, pain-free knee flexion in order to squat without pain within 6 weeks. Patient will tolerate walking for 30 min without increase in pain within 6 weeks. Patient will demonstrate SLS >10 sec without pain within 6 weeks. Patient will achieve KALYAN in FOTO score within 6 weeks to improve functional mobility. Plan: continue PT to decrease knee pain with walking  Date: 12/7/2022  TX#: 2/10 Date:                 TX#: 3/ Date:                 TX#: 4/ Date:                 TX#: 5/ Date:    Tx#: 6/   Stationary bike, lv 3, 4 min  Seated tibial rotation  FSU, 6\" step, 2x10  SLS hip hike in // bars, 2x8  Prone quad stretch, 3x20 sec  Supine HS stretch, 3x20 sec  Quadruped knee flexion stretch, 2'       Manual:  Passive knee flexion w/ IR                     HEP: HS stretch, prone quad stretch, quadruped knee flexion stretch    Charges: 2TE, man Total Timed Treatment: 40 min  Total Treatment Time: 40 min

## 2022-12-13 ENCOUNTER — OFFICE VISIT (OUTPATIENT)
Dept: PHYSICAL THERAPY | Facility: HOSPITAL | Age: 72
End: 2022-12-13
Attending: ORTHOPAEDIC SURGERY
Payer: MEDICARE

## 2022-12-13 PROCEDURE — 97110 THERAPEUTIC EXERCISES: CPT

## 2022-12-13 NOTE — PROGRESS NOTES
Diagnosis:   Right knee pain, unspecified chronicity (M25.561)      Referring Provider: Teresita Campa  Date of Evaluation:    12/5/22    Precautions:  None Next MD visit:   none scheduled  Date of Surgery: n/a   Insurance Primary/Secondary: MEDICARE / [address] [place] [address]     # Auth Visits: 10 per POC            Subjective: Patient reports that her knee has been feeling much better. She has been able to walk without pain. No issues with stairs, squatting, or any other movement. Pain: 0/10      Objective:   Knee PROM full and pain free  Thoracic rotation WNL  Hip IR WNL anthony  Hip ER mildly limited on the R  Ankle eversion, inversion WNL anthony      Assessment: Patient continues to have bilateral quad tightness but HS mobility is improved. She has full knee ROM and good hip rotation ROM bilaterally. She is going to continue independently for 1.5-2 weeks and continue HEP. She will follow up with progress at that time to determine need for further PT. If she is still pain free she will be discharged at that time. Goals:   (to be met in 10 visits)  Patient will demonstrate symmetrical, pain-free knee flexion in order to squat without pain within 6 weeks. Patient will tolerate walking for 30 min without increase in pain within 6 weeks. Patient will demonstrate SLS >10 sec without pain within 6 weeks. Patient will achieve KALYAN in FOTO score within 6 weeks to improve functional mobility. Plan: continue PT to decrease knee pain with walking  Date: 12/7/2022  TX#: 2/10 Date:  12/13/22               TX#: 3/10 Date:                 TX#: 4/ Date:                 TX#: 5/ Date:    Tx#: 6/   Stationary bike, lv 3, 4 min  Seated tibial rotation  FSU, 6\" step, 2x10  SLS hip hike in // bars, 2x8  Prone quad stretch, 3x20 sec  Supine HS stretch, 3x20 sec  Quadruped knee flexion stretch, 2' Prone quad stretch, 3x30 sec  Trunk, hip, knee, ankle assessment  Hook lying hip ER stretch  Hook lying hip IR stretch        Manual:  Passive knee flexion w/ IR                     HEP: HS stretch, prone quad stretch, quadruped knee flexion stretch    Charges: 2TE     Total Timed Treatment: 30 min  Total Treatment Time: 30 min

## 2022-12-15 RX ORDER — LEVOTHYROXINE SODIUM 88 UG/1
TABLET ORAL
Qty: 90 TABLET | Refills: 0 | Status: SHIPPED | OUTPATIENT
Start: 2022-12-15

## 2022-12-16 ENCOUNTER — APPOINTMENT (OUTPATIENT)
Dept: PHYSICAL THERAPY | Facility: HOSPITAL | Age: 72
End: 2022-12-16
Attending: ORTHOPAEDIC SURGERY
Payer: MEDICARE

## 2022-12-16 NOTE — TELEPHONE ENCOUNTER
Thyroid Supplements Protocol Passed 12/15/2022 09:50 AM   Protocol Details  TSH test in past 12 months    TSH value between 0.350 and 5.500 IU/ml    Appointment in past 12 or next 3 months        LOV 10/5/22 tb video     LAST LAB  5/29/22     LAST RX 9/22/22 90 tabs     Next OV   Future Appointments   Date Time Provider Ruben Sheppard   12/28/2022  3:30 PM Ector Guadalupe, PT [address] [facility] PHYS [address]         PROTOCOL pass

## 2022-12-19 ENCOUNTER — APPOINTMENT (OUTPATIENT)
Dept: PHYSICAL THERAPY | Facility: HOSPITAL | Age: 72
End: 2022-12-19
Attending: ORTHOPAEDIC SURGERY
Payer: MEDICARE

## 2022-12-21 RX ORDER — FENOFIBRATE 160 MG/1
TABLET ORAL
Qty: 90 TABLET | Refills: 1 | Status: SHIPPED | OUTPATIENT
Start: 2022-12-21

## 2022-12-28 ENCOUNTER — APPOINTMENT (OUTPATIENT)
Dept: PHYSICAL THERAPY | Facility: HOSPITAL | Age: 72
End: 2022-12-28
Attending: ORTHOPAEDIC SURGERY
Payer: MEDICARE

## 2023-01-11 NOTE — LETTER
[facility] [address], [address]  Consent for Procedure/Sedation    Date: ***    Time: ***      1. I authorize the performance upon Satish Plummer the following:  ***     2. I authorize Dr. Mahi Stack (and whomever is designated as Received referral from Muriel Flores MD to Vesna David MD on 1/10/23 for family history of breast cancer in female [Z80.3]. Mammogram done on 1/7/23.     #: [id number]

## 2023-01-19 RX ORDER — MELOXICAM 7.5 MG/1
7.5 TABLET ORAL DAILY
Qty: 30 TABLET | Refills: 0 | Status: SHIPPED | OUTPATIENT
Start: 2023-01-19

## 2023-01-19 NOTE — TELEPHONE ENCOUNTER
High Blood Pressure: Care Instructions Overview It's normal for blood pressure to go up and down throughout the day. But if it stays up, you have high blood pressure. Another name for high blood pressure is hypertension. Despite what a lot of people think, high blood pressure usually doesn't cause headaches or make you feel dizzy or lightheaded. It usually has no symptoms. But it does increase your risk of stroke, heart attack, and other problems. You and your doctor will talk about your risks of these problems based on your blood pressure. Your doctor will give you a goal for your blood pressure. Your goal will be based on your health and your age. Lifestyle changes, such as eating healthy and being active, are always important to help lower blood pressure. You might also take medicine to reach your blood pressure goal. 
Follow-up care is a key part of your treatment and safety. Be sure to make and go to all appointments, and call your doctor if you are having problems. It's also a good idea to know your test results and keep a list of the medicines you take. How can you care for yourself at home? Medical treatment · If you stop taking your medicine, your blood pressure will go back up. You may take one or more types of medicine to lower your blood pressure. Be safe with medicines. Take your medicine exactly as prescribed. Call your doctor if you think you are having a problem with your medicine. · Talk to your doctor before you start taking aspirin every day. Aspirin can help certain people lower their risk of a heart attack or stroke. But taking aspirin isn't right for everyone, because it can cause serious bleeding. · See your doctor regularly. You may need to see the doctor more often at first or until your blood pressure comes down. Medication requested: Meloxicam  DOS: none  Last OV: 11/30/2022   Last refill date: 11/30/22 #/refills: 30/0  Upcoming appt: No future appointments.     RX pended for review and approval · If you are taking blood pressure medicine, talk to your doctor before you take decongestants or anti-inflammatory medicine, such as ibuprofen. Some of these medicines can raise blood pressure. · Learn how to check your blood pressure at home. Lifestyle changes · Stay at a healthy weight. This is especially important if you put on weight around the waist. Losing even 10 pounds can help you lower your blood pressure. · If your doctor recommends it, get more exercise. Walking is a good choice. Bit by bit, increase the amount you walk every day. Try for at least 30 minutes on most days of the week. You also may want to swim, bike, or do other activities. · Avoid or limit alcohol. Talk to your doctor about whether you can drink any alcohol. · Try to limit how much sodium you eat to less than 2,300 milligrams (mg) a day. Your doctor may ask you to try to eat less than 1,500 mg a day. · Eat plenty of fruits (such as bananas and oranges), vegetables, legumes, whole grains, and low-fat dairy products. · Lower the amount of saturated fat in your diet. Saturated fat is found in animal products such as milk, cheese, and meat. Limiting these foods may help you lose weight and also lower your risk for heart disease. · Do not smoke. Smoking increases your risk for heart attack and stroke. If you need help quitting, talk to your doctor about stop-smoking programs and medicines. These can increase your chances of quitting for good. When should you call for help? Call  911 anytime you think you may need emergency care. This may mean having symptoms that suggest that your blood pressure is causing a serious heart or blood vessel problem. Your blood pressure may be over 180/120. For example, call 911 if: 
  · You have symptoms of a heart attack. These may include: 
? Chest pain or pressure, or a strange feeling in the chest. 
? Sweating. ? Shortness of breath. ? Nausea or vomiting. ? Pain, pressure, or a strange feeling in the back, neck, jaw, or upper belly or in one or both shoulders or arms. ? Lightheadedness or sudden weakness. ? A fast or irregular heartbeat.  
  · You have symptoms of a stroke. These may include: 
? Sudden numbness, tingling, weakness, or loss of movement in your face, arm, or leg, especially on only one side of your body. ? Sudden vision changes. ? Sudden trouble speaking. ? Sudden confusion or trouble understanding simple statements. ? Sudden problems with walking or balance. ? A sudden, severe headache that is different from past headaches.  
  · You have severe back or belly pain. Do not wait until your blood pressure comes down on its own. Get help right away. Call your doctor now or seek immediate care if: 
  · Your blood pressure is much higher than normal (such as 180/120 or higher), but you don't have symptoms.  
  · You think high blood pressure is causing symptoms, such as: 
? Severe headache. 
? Blurry vision. Watch closely for changes in your health, and be sure to contact your doctor if: 
  · Your blood pressure measures higher than your doctor recommends at least 2 times. That means the top number is higher or the bottom number is higher, or both.  
  · You think you may be having side effects from your blood pressure medicine. Where can you learn more? Go to http://www.gray.com/ Enter V094 in the search box to learn more about \"High Blood Pressure: Care Instructions. \" Current as of: December 16, 2019               Content Version: 12.6 © 3947-5963 [employer]. Care instructions adapted under license by [employer] (which disclaims liability or warranty for this information). If you have questions about a medical condition or this instruction, always ask your healthcare professional. [address] any warranty or liability for your use of this information. Vaccine Information Statement Influenza (Flu) Vaccine (Inactivated or Recombinant): What You Need to Know Many Vaccine Information Statements are available in Telugu and other languages. See www.immunize.org/vis Hojas de información sobre vacunas están disponibles en español y en muchos otros idiomas. Visite www.immunize.org/vis 1. Why get vaccinated? Influenza vaccine can prevent influenza (flu). Flu is a contagious disease that spreads around the [place] [facility] every year, usually between October and May. Anyone can get the flu, but it is more dangerous for some people. Infants and young children, people 72years of age and older, pregnant women, and people with certain health conditions or a weakened immune system are at greatest risk of flu complications. Pneumonia, bronchitis, sinus infections and ear infections are examples of flu-related complications. If you have a medical condition, such as heart disease, cancer or diabetes, flu can make it worse. Flu can cause fever and chills, sore throat, muscle aches, fatigue, cough, headache, and runny or stuffy nose. Some people may have vomiting and diarrhea, though this is more common in children than adults. Each year thousands of people in the [facility] die from flu, and many more are hospitalized. Flu vaccine prevents millions of illnesses and flu-related visits to the doctor each year. 2. Influenza vaccines CDC recommends everyone 10months of age and older get vaccinated every flu season. Children 6 months through 6years of age may need 2 doses during a single flu season. Everyone else needs only 1 dose each flu season. It takes about 2 weeks for protection to develop after vaccination. There are many flu viruses, and they are always changing. Each year a new flu vaccine is made to protect against three or four viruses that are likely to cause disease in the upcoming flu season. Even when the vaccine doesnt exactly match these viruses, it may still provide some protection. Influenza vaccine does not cause flu. Influenza vaccine may be given at the same time as other vaccines. 3. Talk with your health care provider Tell your vaccine provider if the person getting the vaccine: 
 Has had an allergic reaction after a previous dose of influenza vaccine, or has any severe, life-threatening allergies.  Has ever had Guillain-Barré Syndrome (also called GBS). In some cases, your health care provider may decide to postpone influenza vaccination to a future visit. People with minor illnesses, such as a cold, may be vaccinated. People who are moderately or severely ill should usually wait until they recover before getting influenza vaccine. Your health care provider can give you more information. 4. Risks of a reaction  Soreness, redness, and swelling where shot is given, fever, muscle aches, and headache can happen after influenza vaccine.  There may be a very small increased risk of Guillain-Barré Syndrome (GBS) after inactivated influenza vaccine (the flu shot). Mirna [facility] children who get the flu shot along with pneumococcal vaccine (PCV13), and/or DTaP vaccine at the same time might be slightly more likely to have a seizure caused by fever. Tell your health care provider if a child who is getting flu vaccine has ever had a seizure. People sometimes faint after medical procedures, including vaccination. Tell your provider if you feel dizzy or have vision changes or ringing in the ears. As with any medicine, there is a very remote chance of a vaccine causing a severe allergic reaction, other serious injury, or death. 5. What if there is a serious problem? An allergic reaction could occur after the vaccinated person leaves the clinic. If you see signs of a severe allergic reaction (hives, swelling of the face and throat, difficulty breathing, a fast heartbeat, dizziness, or weakness), call 9-1-1 and get the person to the nearest hospital. 
 
For other signs that concern you, call your health care provider. Adverse reactions should be reported to the Vaccine Adverse Event Reporting System (VAERS). Your health care provider will usually file this report, or you can do it yourself. Visit the VAERS website at www.vaers. [facility].gov or call 7-498.122.9289. VAERS is only for reporting reactions, and VAERS staff do not give medical advice. 6. The National Vaccine Injury Compensation Program 
 
The [facility] Vaccine Injury Compensation Program (VICP) is a federal program that was created to compensate people who may have been injured by certain vaccines. Visit the VICP website at www.[facility].gov/vaccinecompensation or call 9-106.784.8418 to learn about the program and about filing a claim. There is a time limit to file a claim for compensation. 7. How can I learn more?  Ask your health care provider.  Call your local or state health department.  Contact the Centers for Disease Control and Prevention (CDC): 
- Call 4-167.971.5304 (1-800-CDC-INFO) or 
- Visit CDCs influenza website at www.cdc.gov/flu Vaccine Information Statement (Interim) Inactivated Influenza Vaccine 8/15/2019 
42 BLANCA Tran 131SB-45 Department of [facility] and [employer] Centers for Disease Control and Prevention Office Use Only

## 2023-02-21 DIAGNOSIS — M23.91 INTERNAL DERANGEMENT OF RIGHT KNEE: ICD-10-CM

## 2023-02-21 RX ORDER — CITALOPRAM 40 MG/1
TABLET ORAL
Qty: 90 TABLET | Refills: 0 | Status: SHIPPED | OUTPATIENT
Start: 2023-02-21

## 2023-02-22 RX ORDER — MELOXICAM 7.5 MG/1
7.5 TABLET ORAL
Qty: 30 TABLET | Refills: 0 | Status: SHIPPED | OUTPATIENT
Start: 2023-02-22

## 2023-02-22 NOTE — TELEPHONE ENCOUNTER
Last OV: 11/30/22  Last refill date: 1/19/23     #/refills: 30/0  Upcoming appt: No future appointments.

## 2023-03-21 RX ORDER — LEVOTHYROXINE SODIUM 88 UG/1
TABLET ORAL
Qty: 90 TABLET | Refills: 0 | Status: SHIPPED | OUTPATIENT
Start: 2023-03-21

## 2023-03-27 ENCOUNTER — HOSPITAL ENCOUNTER (OUTPATIENT)
Dept: CT IMAGING | Age: 73
Discharge: HOME | End: 2023-03-27
Attending: NURSE PRACTITIONER
Payer: MEDICARE

## 2023-03-27 DIAGNOSIS — R91.8 PULMONARY NODULES: ICD-10-CM

## 2023-03-27 PROCEDURE — 71250 CT THORAX DX C-: CPT | Performed by: NURSE PRACTITIONER

## 2023-03-29 ENCOUNTER — TELEPHONE (OUTPATIENT)
Dept: INTERNAL MEDICINE CLINIC | Facility: CLINIC | Age: 73
End: 2023-03-29

## 2023-03-29 DIAGNOSIS — Z00.00 ROUTINE GENERAL MEDICAL EXAMINATION AT A HEALTH CARE FACILITY: Primary | ICD-10-CM

## 2023-03-29 DIAGNOSIS — E03.9 HYPOTHYROIDISM (ACQUIRED): ICD-10-CM

## 2023-03-29 DIAGNOSIS — E78.00 PURE HYPERCHOLESTEROLEMIA: ICD-10-CM

## 2023-03-29 NOTE — TELEPHONE ENCOUNTER
Future Appointments   Date Time Provider Ruben Valarie   6/12/2023 10:40 AM PATTI [facility] [facility]     Patient is scheduled for [address] Please place orders with [facility]. Patient aware to fast.  No call back required. Patient informed that labs need to be completed no sooner than 2 weeks prior to the appointment.

## 2023-03-30 DIAGNOSIS — M23.91 INTERNAL DERANGEMENT OF RIGHT KNEE: ICD-10-CM

## 2023-03-31 RX ORDER — MELOXICAM 7.5 MG/1
7.5 TABLET ORAL
Qty: 30 TABLET | Refills: 0 | Status: SHIPPED | OUTPATIENT
Start: 2023-03-31

## 2023-03-31 NOTE — TELEPHONE ENCOUNTER
Last OV: 11/3/22  Last refill date: 2/22/23     #/refills: 30/0  Upcoming appt:    Future Appointments   Date Time Provider Ruben Sheppard   6/12/2023 10:40 AM PATTI [place] [facility] [facility]

## 2023-04-10 ENCOUNTER — OFFICE VISIT (OUTPATIENT)
Dept: INTERNAL MEDICINE CLINIC | Facility: CLINIC | Age: 73
End: 2023-04-10
Payer: MEDICARE

## 2023-04-10 VITALS
HEIGHT: 64 IN | WEIGHT: 202 LBS | TEMPERATURE: 98 F | SYSTOLIC BLOOD PRESSURE: 114 MMHG | RESPIRATION RATE: 16 BRPM | BODY MASS INDEX: 34.49 KG/M2 | HEART RATE: 76 BPM | DIASTOLIC BLOOD PRESSURE: 70 MMHG

## 2023-04-10 DIAGNOSIS — K42.9 UMBILICAL HERNIA WITHOUT OBSTRUCTION AND WITHOUT GANGRENE: Primary | ICD-10-CM

## 2023-04-10 DIAGNOSIS — Z12.31 ENCOUNTER FOR SCREENING MAMMOGRAM FOR MALIGNANT NEOPLASM OF BREAST: ICD-10-CM

## 2023-04-10 PROCEDURE — 99213 OFFICE O/P EST LOW 20 MIN: CPT | Performed by: NURSE PRACTITIONER

## 2023-06-02 ENCOUNTER — LAB ENCOUNTER (OUTPATIENT)
Dept: LAB | Age: 73
End: 2023-06-02
Attending: NURSE PRACTITIONER
Payer: MEDICARE

## 2023-06-02 DIAGNOSIS — Z00.00 ROUTINE GENERAL MEDICAL EXAMINATION AT A HEALTH CARE FACILITY: ICD-10-CM

## 2023-06-02 DIAGNOSIS — E03.9 HYPOTHYROIDISM (ACQUIRED): ICD-10-CM

## 2023-06-02 DIAGNOSIS — E78.00 PURE HYPERCHOLESTEROLEMIA: ICD-10-CM

## 2023-06-02 LAB
ALBUMIN SERPL-MCNC: 3.8 G/DL (ref 3.4–5)
ALBUMIN/GLOB SERPL: 1.2 {RATIO} (ref 1–2)
ALP LIVER SERPL-CCNC: 73 U/L
ALT SERPL-CCNC: 28 U/L
ANION GAP SERPL CALC-SCNC: 3 MMOL/L (ref 0–18)
AST SERPL-CCNC: 24 U/L (ref 15–37)
BASOPHILS # BLD AUTO: 0.04 X10(3) UL (ref 0–0.2)
BASOPHILS NFR BLD AUTO: 0.9 %
BILIRUB SERPL-MCNC: 0.4 MG/DL (ref 0.1–2)
BUN BLD-MCNC: 20 MG/DL (ref 7–18)
CALCIUM BLD-MCNC: 8.9 MG/DL (ref 8.5–10.1)
CHLORIDE SERPL-SCNC: 108 MMOL/L (ref 98–112)
CHOLEST SERPL-MCNC: 196 MG/DL (ref ?–200)
CO2 SERPL-SCNC: 26 MMOL/L (ref 21–32)
CREAT BLD-MCNC: 0.87 MG/DL
EOSINOPHIL # BLD AUTO: 0.17 X10(3) UL (ref 0–0.7)
EOSINOPHIL NFR BLD AUTO: 4 %
ERYTHROCYTE [DISTWIDTH] IN BLOOD BY AUTOMATED COUNT: 13 %
FASTING PATIENT LIPID ANSWER: YES
FASTING STATUS PATIENT QL REPORTED: YES
GFR SERPLBLD BASED ON 1.73 SQ M-ARVRAT: 70 ML/MIN/1.73M2 (ref 60–?)
GLOBULIN PLAS-MCNC: 3.2 G/DL (ref 2.8–4.4)
GLUCOSE BLD-MCNC: 91 MG/DL (ref 70–99)
HCT VFR BLD AUTO: 38 %
HDLC SERPL-MCNC: 61 MG/DL (ref 40–59)
HGB BLD-MCNC: 12.4 G/DL
IMM GRANULOCYTES # BLD AUTO: 0.02 X10(3) UL (ref 0–1)
IMM GRANULOCYTES NFR BLD: 0.5 %
LDLC SERPL CALC-MCNC: 113 MG/DL (ref ?–100)
LYMPHOCYTES # BLD AUTO: 1.6 X10(3) UL (ref 1–4)
LYMPHOCYTES NFR BLD AUTO: 37.9 %
MCH RBC QN AUTO: 30 PG (ref 26–34)
MCHC RBC AUTO-ENTMCNC: 32.6 G/DL (ref 31–37)
MCV RBC AUTO: 91.8 FL
MONOCYTES # BLD AUTO: 0.46 X10(3) UL (ref 0.1–1)
MONOCYTES NFR BLD AUTO: 10.9 %
NEUTROPHILS # BLD AUTO: 1.93 X10 (3) UL (ref 1.5–7.7)
NEUTROPHILS # BLD AUTO: 1.93 X10(3) UL (ref 1.5–7.7)
NEUTROPHILS NFR BLD AUTO: 45.8 %
NONHDLC SERPL-MCNC: 135 MG/DL (ref ?–130)
OSMOLALITY SERPL CALC.SUM OF ELEC: 286 MOSM/KG (ref 275–295)
PLATELET # BLD AUTO: 303 10(3)UL (ref 150–450)
POTASSIUM SERPL-SCNC: 4.3 MMOL/L (ref 3.5–5.1)
PROT SERPL-MCNC: 7 G/DL (ref 6.4–8.2)
RBC # BLD AUTO: 4.14 X10(6)UL
SODIUM SERPL-SCNC: 137 MMOL/L (ref 136–145)
TRIGL SERPL-MCNC: 122 MG/DL (ref 30–149)
TSI SER-ACNC: 0.62 MIU/ML (ref 0.36–3.74)
VLDLC SERPL CALC-MCNC: 21 MG/DL (ref 0–30)
WBC # BLD AUTO: 4.2 X10(3) UL (ref 4–11)

## 2023-06-02 PROCEDURE — 84443 ASSAY THYROID STIM HORMONE: CPT

## 2023-06-02 PROCEDURE — 85025 COMPLETE CBC W/AUTO DIFF WBC: CPT

## 2023-06-02 PROCEDURE — 80053 COMPREHEN METABOLIC PANEL: CPT

## 2023-06-02 PROCEDURE — 80061 LIPID PANEL: CPT

## 2023-06-02 PROCEDURE — 36415 COLL VENOUS BLD VENIPUNCTURE: CPT

## 2023-06-12 ENCOUNTER — OFFICE VISIT (OUTPATIENT)
Dept: INTERNAL MEDICINE CLINIC | Facility: CLINIC | Age: 73
End: 2023-06-12
Payer: MEDICARE

## 2023-06-12 VITALS
WEIGHT: 199 LBS | RESPIRATION RATE: 18 BRPM | HEART RATE: 77 BPM | HEIGHT: 64 IN | BODY MASS INDEX: 33.97 KG/M2 | TEMPERATURE: 98 F | SYSTOLIC BLOOD PRESSURE: 118 MMHG | OXYGEN SATURATION: 96 % | DIASTOLIC BLOOD PRESSURE: 70 MMHG

## 2023-06-12 DIAGNOSIS — M17.12 PRIMARY OSTEOARTHRITIS OF LEFT KNEE: ICD-10-CM

## 2023-06-12 DIAGNOSIS — M85.80 OSTEOPENIA, UNSPECIFIED LOCATION: ICD-10-CM

## 2023-06-12 DIAGNOSIS — I74.8 THROMBOSIS OF SPLENIC ARTERY (HCC): ICD-10-CM

## 2023-06-12 DIAGNOSIS — Z85.828 HISTORY OF BASAL CELL CARCINOMA (BCC): ICD-10-CM

## 2023-06-12 DIAGNOSIS — R33.9 CHRONIC RETENTION OF URINE: ICD-10-CM

## 2023-06-12 DIAGNOSIS — Z86.711 HISTORY OF PULMONARY EMBOLISM: ICD-10-CM

## 2023-06-12 DIAGNOSIS — Z00.00 ENCOUNTER FOR ANNUAL HEALTH EXAMINATION: Primary | ICD-10-CM

## 2023-06-12 DIAGNOSIS — D35.02 ADENOMA OF LEFT ADRENAL GLAND: ICD-10-CM

## 2023-06-12 DIAGNOSIS — E55.9 VITAMIN D DEFICIENCY: ICD-10-CM

## 2023-06-12 DIAGNOSIS — E78.00 PURE HYPERCHOLESTEROLEMIA: ICD-10-CM

## 2023-06-12 DIAGNOSIS — R91.8 PULMONARY NODULES: ICD-10-CM

## 2023-06-12 DIAGNOSIS — D64.9 NORMOCYTIC ANEMIA: ICD-10-CM

## 2023-06-12 DIAGNOSIS — R13.10 DYSPHAGIA, UNSPECIFIED TYPE: ICD-10-CM

## 2023-06-12 DIAGNOSIS — I72.8 CELIAC ARTERY DILATATION (HCC): ICD-10-CM

## 2023-06-12 DIAGNOSIS — F41.1 ANXIETY STATE: ICD-10-CM

## 2023-06-12 DIAGNOSIS — E03.9 HYPOTHYROIDISM (ACQUIRED): ICD-10-CM

## 2023-06-12 DIAGNOSIS — K59.01 SLOW TRANSIT CONSTIPATION: ICD-10-CM

## 2023-06-12 DIAGNOSIS — N31.9 NEUROGENIC BLADDER: ICD-10-CM

## 2023-06-12 DIAGNOSIS — F33.0 MILD EPISODE OF RECURRENT MAJOR DEPRESSIVE DISORDER (HCC): ICD-10-CM

## 2023-06-12 PROBLEM — Z71.89 ENCOUNTER FOR ANTICOAGULATION DISCUSSION AND COUNSELING: Status: RESOLVED | Noted: 2021-08-16 | Resolved: 2023-06-12

## 2023-06-12 RX ORDER — FENOFIBRATE 160 MG/1
160 TABLET ORAL DAILY
Qty: 90 TABLET | Refills: 3 | Status: SHIPPED | OUTPATIENT
Start: 2023-06-12

## 2023-06-12 RX ORDER — PANTOPRAZOLE SODIUM 40 MG/1
40 TABLET, DELAYED RELEASE ORAL
Qty: 90 TABLET | Refills: 0 | Status: SHIPPED | OUTPATIENT
Start: 2023-06-12

## 2023-06-13 ENCOUNTER — LAB ENCOUNTER (OUTPATIENT)
Dept: LAB | Age: 73
End: 2023-06-13
Attending: NURSE PRACTITIONER
Payer: MEDICARE

## 2023-06-13 DIAGNOSIS — E55.9 VITAMIN D DEFICIENCY: ICD-10-CM

## 2023-06-13 LAB — VIT D+METAB SERPL-MCNC: 42.2 NG/ML (ref 30–100)

## 2023-06-13 PROCEDURE — 36415 COLL VENOUS BLD VENIPUNCTURE: CPT

## 2023-06-13 PROCEDURE — 82306 VITAMIN D 25 HYDROXY: CPT

## 2023-06-26 RX ORDER — LEVOTHYROXINE SODIUM 88 UG/1
TABLET ORAL
Qty: 90 TABLET | Refills: 1 | Status: SHIPPED | OUTPATIENT
Start: 2023-06-26

## 2023-07-05 RX ORDER — FENOFIBRATE 160 MG/1
TABLET ORAL
Qty: 90 TABLET | Refills: 3 | OUTPATIENT
Start: 2023-07-05

## 2023-07-06 RX ORDER — FENOFIBRATE 160 MG/1
160 TABLET ORAL DAILY
Qty: 90 TABLET | Refills: 3 | Status: SHIPPED | OUTPATIENT
Start: 2023-07-06

## 2023-07-06 NOTE — TELEPHONE ENCOUNTER
Cholesterol Medication Protocol [id number]/06/2023 02:43 PM   Protocol Details ALT < 80    ALT resulted within past year    Lipid panel within past 12 months    Appointment within past 12 or next 3 months     Last seen for annual 6/12/23      Return in about 6 weeks (around 7/24/2023) for dysphagia.     Last lipid 6/2/23

## 2023-08-10 ENCOUNTER — HOSPITAL ENCOUNTER (OUTPATIENT)
Dept: MAMMOGRAPHY | Age: 73
Discharge: HOME | End: 2023-08-10
Attending: NURSE PRACTITIONER
Payer: MEDICARE

## 2023-08-10 DIAGNOSIS — Z12.31 ENCOUNTER FOR SCREENING MAMMOGRAM FOR MALIGNANT NEOPLASM OF BREAST: ICD-10-CM

## 2023-08-10 PROCEDURE — 77067 SCR MAMMO BI INCL CAD: CPT | Performed by: NURSE PRACTITIONER

## 2023-08-10 PROCEDURE — 77063 BREAST TOMOSYNTHESIS BI: CPT | Performed by: NURSE PRACTITIONER

## 2023-08-24 ENCOUNTER — LAB ENCOUNTER (OUTPATIENT)
Dept: LAB | Age: 73
End: 2023-08-24
Attending: INTERNAL MEDICINE
Payer: MEDICARE

## 2023-08-24 DIAGNOSIS — R14.0 BLOATING: ICD-10-CM

## 2023-08-24 LAB — IGA SERPL-MCNC: 168 MG/DL (ref 70–312)

## 2023-08-24 PROCEDURE — 86364 TISS TRNSGLTMNASE EA IG CLAS: CPT

## 2023-08-24 PROCEDURE — 82784 ASSAY IGA/IGD/IGG/IGM EACH: CPT

## 2023-08-24 PROCEDURE — 36415 COLL VENOUS BLD VENIPUNCTURE: CPT

## 2023-08-28 LAB — TTG IGA SER-ACNC: 0.3 U/ML (ref ?–7)

## 2023-08-29 PROBLEM — K21.9 GASTROESOPHAGEAL REFLUX DISEASE: Status: ACTIVE | Noted: 2023-08-29

## 2023-08-29 PROBLEM — R14.1 ABDOMINAL GAS PAIN: Status: ACTIVE | Noted: 2023-08-29

## 2023-08-29 PROBLEM — R13.10 DYSPHAGIA: Status: ACTIVE | Noted: 2023-08-29

## 2023-09-20 ENCOUNTER — NURSE ONLY (OUTPATIENT)
Dept: LAB | Age: 73
End: 2023-09-20
Attending: INTERNAL MEDICINE
Payer: MEDICARE

## 2023-09-20 DIAGNOSIS — K29.00 OTHER ACUTE GASTRITIS WITHOUT HEMORRHAGE: ICD-10-CM

## 2023-09-20 PROCEDURE — 83013 H PYLORI (C-13) BREATH: CPT

## 2023-09-21 LAB — H PYLORI BREATH TEST: NEGATIVE

## 2023-12-19 RX ORDER — LEVOTHYROXINE SODIUM 88 UG/1
TABLET ORAL
Qty: 90 TABLET | Refills: 1 | OUTPATIENT
Start: 2023-12-19

## 2023-12-19 NOTE — TELEPHONE ENCOUNTER
Last VISIT 06/12/2023-SD-Annual Physical    Last CPE 6/12/2023    Last REFILL   levothyroxine 88 MCG Oral Tab 90 tablet 1 6/26/2023 --   Sig:   TAKE 1 TABLET(88 MCG) BY MOUTH BEFORE BREAKFAST     Route:   (none)     Order #:   [id number]         Last LABS 6/2/2023    Future Appointments   Date Time Provider Department Center   2/20/2024  7:30 AM Yvonne Sexton, DO [facility]         Per PROTOCOL?     Please Approve or Deny.

## 2023-12-26 RX ORDER — LEVOTHYROXINE SODIUM 88 UG/1
88 TABLET ORAL
Qty: 90 TABLET | Refills: 1 | Status: SHIPPED | OUTPATIENT
Start: 2023-12-26

## 2023-12-26 NOTE — TELEPHONE ENCOUNTER
Last VISIT:06/12/2023 SD,PATTI    Last CPE: 06/12/2023    Last REFILL:06/26/2023   Medication Quantity Refills Start End   levothyroxine 88 MCG Oral Tab 90 tablet 1         Last LABS:06/02/2023    Future Appointments   Date Time Provider Ruben Sheppard   2/20/2024  7:30 AM Kendy Sexton, DO [id number] [place] [address] SUB GI         Per PROTOCOL?  Passed Protocol

## 2024-01-03 ENCOUNTER — HOSPITAL ENCOUNTER (OUTPATIENT)
Age: 74
Discharge: HOME | End: 2024-01-03
Payer: MEDICARE

## 2024-01-03 VITALS
OXYGEN SATURATION: 100 % | DIASTOLIC BLOOD PRESSURE: 67 MMHG | HEIGHT: 64 IN | TEMPERATURE: 98 F | RESPIRATION RATE: 16 BRPM | WEIGHT: 195 LBS | HEART RATE: 87 BPM | SYSTOLIC BLOOD PRESSURE: 133 MMHG | BODY MASS INDEX: 33.29 KG/M2

## 2024-01-03 DIAGNOSIS — J01.10 ACUTE NON-RECURRENT FRONTAL SINUSITIS: Primary | ICD-10-CM

## 2024-01-03 PROCEDURE — 99213 OFFICE O/P EST LOW 20 MIN: CPT | Performed by: NURSE PRACTITIONER

## 2024-01-03 RX ORDER — DOXYCYCLINE HYCLATE 100 MG/1
100 CAPSULE ORAL 2 TIMES DAILY
Qty: 20 CAPSULE | Refills: 0 | Status: SHIPPED | OUTPATIENT
Start: 2024-01-03 | End: 2024-01-13

## 2024-01-03 NOTE — ED PROVIDER NOTES
Patient Seen in: [facility]      History     Chief Complaint   Patient presents with    Sinus Problem     Stated Complaint: sinus pressure /headaches/runny nose x 6 weeks    Subjective:   HPI    73-year-old female presents to the IC with sinus pain pressure congestion for the past 6 to 8 weeks.  Patient states she thought it was viral and has not made an appoint with her primary for this chronic condition.  Denies any shortness breath or any Diffley breathing.  No headache or dizziness no pain with eye movement.  Patient has no issues with concerns.  The patient's medication list, past medical history and social history elements as listed in today's nurse's notes were reviewed and agreed (except as otherwise stated in the HPI).  The patient's family history reviewed and determined to be noncontributory to the presenting problem.      Objective:   Past Medical History:   Diagnosis Date    Abdominal distention 2022    Abdominal pain 2021    Had pancreatitis    Anxiety     Take lorazepam occasionally    Arthritis     Belching 2022    Bloating 2020    Chest pain     Chronic cough     Constipation     Depression     Diarrhea, unspecified Occasionally    Disorder of thyroid     Easy bruising     Esophageal reflux     Take omepazole    Fatigue     When have digestive issues    Flatulence/gas pain/belching 2020    Heartburn 2020    High cholesterol     History of depression     Hyperlipidemia     Hypothyroidism     Indigestion 2020    Irregular bowel habits 2022    Osteoarthritis     generalized    Pain with bowel movements At times with constipation    Painful swallowing 11/2022    Painful urination 2017    Problems with swallowing     Pulmonary embolism (HCC) 05/2021    Shortness of breath 05/09/2021    Had pulmonary embolisms    Sleep disturbance 2022    At times; heartburn    Thyroid disease     Uncomfortable fullness after meals 2022    Urinary retention     surgery scheduled 02/08/17    Visual impairment      glasses    Wears glasses     Weight gain     Wheezing     At times lying down              Past Surgical History:   Procedure Laterality Date    BREAST SURGERY PROCEDURE UNLISTED  10/2000    breast surgery lumpectomy    CHOLECYSTECTOMY  2021    COLONOSCOPY  09/21/2007    diverticulosis, Dr. Stevan Chadwick, Repeat in 7-10 years    COLONOSCOPY  2000    COLONOSCOPY  06/12/2018    high fiber diet,hep c antibody,EGD to evaluate,no further colonoscopy indicated    HYSTERECTOMY  1994    total    LUMPECTOMY RIGHT  2001    dcis    NEEDLE BIOPSY RIGHT  2001    OOPHORECTOMY Bilateral 1994    OTHER      left elbow    OTHER  02/08/2017    Interstim stage I    OTHER SURGICAL HISTORY  04/24/2018    stage 2 pudendal interstim dr riggs    REMOVAL GALLBLADDER      TOTAL ABDOM HYSTERECTOMY      Age 43                Social History     Socioeconomic History    Marital status:    Tobacco Use    Smoking status: Never    Smokeless tobacco: Never   Vaping Use    Vaping Use: Never used   Substance and Sexual Activity    Alcohol use: Not Currently     Comment: rarely/1-2x a year    Drug use: Not Currently     Types: Cannabis     Comment: Not recently    Sexual activity: Yes   Other Topics Concern    Caffeine Concern Yes    Stress Concern No    Weight Concern Yes    Special Diet No    Exercise Yes    Seat Belt No              Review of Systems   Constitutional:  Positive for chills.   HENT:  Positive for congestion, postnasal drip and sinus pressure.    All other systems reviewed and are negative.      Positive for stated complaint: sinus pressure /headaches/runny nose x 6 weeks  Other systems are as noted in HPI.  Constitutional and vital signs reviewed.      All other systems reviewed and negative except as noted above.    Physical Exam     ED Triage Vitals [01/03/24 1012]   /67   Pulse 87   Resp 16   Temp 97.8 °F (36.6 °C)   Temp src Temporal   SpO2 100 %   O2 Device None (Room air)       Current:/67   Pulse 87   Temp  97.8 °F (36.6 °C) (Temporal)   Resp 16   Ht 162.6 cm (5' 4\")   Wt 88.5 kg   LMP  (LMP Unknown)   SpO2 100%   BMI 33.47 kg/m²         Physical Exam    GENERAL: The patient is well-developed well-nourished nontoxic, non-ill-appearing  HEENT: Normocephalic.  Atraumatic.  Extraocular motions are intact tenderness is noted to the frontal sinus  NECK: Supple.  No meningitic signs are noted.   CHEST/LUNGS: Clear to auscultation.  There is no respiratory distress noted.  HEART/CARDIOVASCULAR: Regular.  There is no tachycardia.   SKIN: There is no rash.  NEURO: The patient is awake, alert, and oriented.  The patient is cooperative.    ED Course   Labs Reviewed - No data to display                   MDM   Pertinent Labs & Imaging studies reviewed. (See chart for details)  Differential diagnosis considered but not limited to: Bacterial sinus affection viral sinus infection meningitis viral URI  Patient coming in with sinus pressure congestion for 6 to 8 weeks. Will treat for possible chronic sinus infection. Will discharge on doxycycline. Patient is comfortable with this plan.  Overall Pt looks good. Non-toxic, well-hydrated and in no respiratory distress. Vital signs are reassuring. Exam is reassuring. I do not believe pt  requires and additional  diagnostic studiesor intervention. I believe pt  can be discharged home to continue evaluation as an outpatient. Follow-up provider given. Discharge instructions given and reviewed. Return for any problems. All understand and agreewith the plan.    Please note that this report has been produced using speech recognition software and may contain errors related to that system including, but not limited to, errors in grammar, punctuation, and spelling, as well as words and phrases that possibly may have been recognized inappropriately.  If there are any questions or concerns, contact the dictating provider for clarification.    Note to patient: The 21st Century Cures Act makes  medical notes like these available to patients in the interest of transparency. However, this is a medical document intended as peer to peer communication. It is written in medical language and may contain abbreviations or verbiage that are unfamiliar. It may appear blunt or direct. Medical documents are intended to carry relevant information, facts as evident, and the clinical opinion of the practitioner.                                      Medical Decision Making      Disposition and Plan     Clinical Impression:  1. Acute non-recurrent frontal sinusitis         Disposition:  Discharge  1/3/2024 11:00 am    Follow-up:  No follow-up provider specified.        Medications Prescribed:  Discharge Medication List as of 1/3/2024 11:00 AM        START taking these medications    Details   doxycycline 100 MG Oral Cap Take 1 capsule (100 mg total) by mouth 2 (two) times daily for 10 days., Normal, Disp-20 capsule, R-0

## 2024-02-12 RX ORDER — CITALOPRAM 40 MG/1
40 TABLET ORAL DAILY
Qty: 90 TABLET | Refills: 3 | Status: SHIPPED | OUTPATIENT
Start: 2024-02-12

## 2024-02-20 PROBLEM — Z12.11 SPECIAL SCREENING FOR MALIGNANT NEOPLASM OF COLON: Status: ACTIVE | Noted: 2024-02-20

## 2024-02-20 PROBLEM — Z80.0 FAMILY HISTORY OF COLON CANCER: Status: ACTIVE | Noted: 2024-02-20

## 2024-03-08 ENCOUNTER — HOSPITAL ENCOUNTER (OUTPATIENT)
Dept: GENERAL RADIOLOGY | Age: 74
Discharge: HOME | End: 2024-03-08
Attending: INTERNAL MEDICINE
Payer: MEDICARE

## 2024-03-08 ENCOUNTER — OFFICE VISIT (OUTPATIENT)
Dept: INTERNAL MEDICINE CLINIC | Facility: CLINIC | Age: 74
End: 2024-03-08
Payer: MEDICARE

## 2024-03-08 VITALS
BODY MASS INDEX: 34 KG/M2 | RESPIRATION RATE: 18 BRPM | TEMPERATURE: 98 F | WEIGHT: 199.38 LBS | SYSTOLIC BLOOD PRESSURE: 120 MMHG | HEART RATE: 92 BPM | DIASTOLIC BLOOD PRESSURE: 80 MMHG | OXYGEN SATURATION: 97 %
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
## 2024-03-08 DIAGNOSIS — M25.562 ACUTE PAIN OF LEFT KNEE: ICD-10-CM

## 2024-03-08 DIAGNOSIS — S81.812A: Primary | ICD-10-CM

## 2024-03-08 DIAGNOSIS — Z01.89 ENCOUNTER FOR LOWER EXTREMITY COMPARISON IMAGING STUDY: ICD-10-CM

## 2024-03-08 DIAGNOSIS — Z86.711 HISTORY OF PULMONARY EMBOLISM: ICD-10-CM

## 2024-03-08 PROCEDURE — G2211 COMPLEX E/M VISIT ADD ON: HCPCS | Performed by: INTERNAL MEDICINE

## 2024-03-08 PROCEDURE — 73564 X-RAY EXAM KNEE 4 OR MORE: CPT | Performed by: INTERNAL MEDICINE

## 2024-03-08 PROCEDURE — 73562 X-RAY EXAM OF KNEE 3: CPT | Performed by: INTERNAL MEDICINE

## 2024-03-08 PROCEDURE — 99214 OFFICE O/P EST MOD 30 MIN: CPT | Performed by: INTERNAL MEDICINE

## 2024-03-08 NOTE — PROGRESS NOTES
Allyn Delarosa is a 73 year old female.    Chief Complaint   Patient presents with    Fall     2/26/24- fall [place] escalator at airport, . Yesterday 3/7/24 left knee went out       HPI:     Pleasant patient with h/o PE 3 years ago after a fall, OA of knees, anxiety and depression here after she had a fall on 2/26/24 on her way to a cruise.  Patient was on an escalator and her foot got caught, she went tumbling down at least 5 steps at [place] airport. Paramedics came because her leg was bleeding as was the left side of her head. She had several laceration on her left shin and small cut by her left ear. She did not have LOC. Paramedics put bandages on her and she made it to her cruise to the [facility]. She was able to enjoy the cruise from 2/26-3/7. Coming back from the ship to the airport yesterday, she had incidence of left knee giving out on her. She was holding on to the seat so did not fall. Left inner knee hurting ever since that episode. Pain is worse with ambulation and better with sitting. She is using cane to help with balance.  She denies pain in calves, SOB/CP/palpitations. She is utd on tetanus vaccine         Patient Active Problem List   Diagnosis    Pain in joint, lower leg    Pain in joint, pelvic region and thigh    Pure hypercholesterolemia    Depression    Hypothyroidism (acquired)    Anxiety state    Enthesopathy of hip region    Osteopenia    Neurogenic bladder    Chronic retention of urine    Slow transit constipation    History of pulmonary embolism    Pulmonary nodules    Primary osteoarthritis of left knee    Normocytic anemia    Hemarthrosis of left knee    History of acute pancreatitis    S/P laparoscopic cholecystectomy    Adenoma of left adrenal gland    Thrombosis of splenic artery (HCC)    Celiac artery dilatation (HCC)    History of basal cell carcinoma (BCC)    Dysphagia    Gastroesophageal reflux disease    Abdominal bloating    Special screening for malignant neoplasm of  colon    Family history of colon cancer     Current Outpatient Medications   Medication Sig Dispense Refill    citalopram 40 MG Oral Tab Take 1 tablet (40 mg total) by mouth daily. 90 tablet 3    lansoprazole 30 MG Oral Capsule Delayed Release Take 1 capsule (30 mg total) by mouth before breakfast. 30 capsule 3    levothyroxine 88 MCG Oral Tab Take 1 tablet (88 mcg total) by mouth before breakfast. 90 tablet 1    LORazepam 1 MG Oral Tab Take 1 tablet (1 mg total) by mouth daily as needed. 20 tablet 0    Fenofibrate 160 MG Oral Tab Take 1 tablet (160 mg total) by mouth daily. 90 tablet 3    METHENAMINE 1 g Oral Tab TAKE 1 TABLET(1 GRAM) BY MOUTH TWICE DAILY 60 tablet 0      Past Medical History:   Diagnosis Date    Abdominal distention 2022    Abdominal hernia     Hiatal & umbilical    Abdominal pain 2021    Had pancreatitis    Anxiety     Take lorazepam occasionally    Arthritis     Belching 2022    Bloating 2020    Blood in the stool     At times    Chest pain     Chronic cough     Constipation     Depression     Diarrhea, unspecified Occasionally    Disorder of thyroid     Easy bruising     Esophageal reflux     Take omepazole    Fatigue     When have digestive issues    Flatulence/gas pain/belching 2020    Headache disorder Lifelong    Off & on.    Heartburn 2020    Hemorrhoids     Internal    High cholesterol     History of depression     Hyperlipidemia     Hypothyroidism     Indigestion 2020    Irregular bowel habits 2022    Osteoarthritis     generalized    Pain with bowel movements At times with constipation    Painful swallowing 11/2022    Painful urination 2017    Problems with swallowing     Pulmonary embolism (HCC) 05/2021    Shortness of breath 05/09/2021    Had pulmonary embolisms    Sleep disturbance 2022    At times; heartburn    Thyroid disease     Uncomfortable fullness after meals 2022    Urinary retention     surgery scheduled 02/08/17    Visual impairment     glasses    Wears glasses     Weight  gain     Wheezing     At times lying down      Social History:  Social History     Socioeconomic History    Marital status:    Tobacco Use    Smoking status: Never    Smokeless tobacco: Never   Vaping Use    Vaping Use: Never used   Substance and Sexual Activity    Alcohol use: Not Currently     Comment: Almost never    Drug use: Not Currently     Types: Cannabis     Comment: Not recently    Sexual activity: Yes   Other Topics Concern    Caffeine Concern Yes    Stress Concern No    Weight Concern Yes    Special Diet No    Exercise Yes    Seat Belt No     Family History   Problem Relation Age of Onset    Alcohol and Other Disorders Associated Maternal Grandfather     Cancer Sister         breast    Breast Cancer Sister 45        DCIS    DCIS Sister     Diabetes Maternal Grandmother     Heart Disease Mother     Hypertension Mother     Prostate Cancer Brother     Diabetes Brother         Error-remove    Other (HIV) Brother     Hypertension Brother     Stroke Paternal Grandfather     Breast Cancer Self 52    DCIS Self     Cancer Father         lung    Hypertension Father     Cancer Sister         Breast    Hypertension Sister     Cancer Brother         Throat    Hypertension Brother     Cancer Brother         Prostate    Hypertension Brother     Cancer Brother     Colon Cancer Brother         2021    Prostate Cancer Sister     Hypertension Sister         Error    Hypertension Brother     Uterine Cancer Paternal Uncle         Error-remove        Allergies  Allergies   Allergen Reactions    Bactrim [Sulfamethoxazole W/Trimethoprim] NAUSEA AND VOMITING    Levaquin [Levofloxacin] NAUSEA ONLY and DIZZINESS         REVIEW OF SYSTEMS:   GENERAL HEALTH:  no fevers   RESPIRATORY: no cough  CARDIOVASCULAR: denies chest pain  GI: denies abdominal pain  : no dysuria  NEURO: denies headaches  MS: left knee pain and instability  HEME: No adenopathy      EXAM:   /80   Pulse 92   Temp 97.7 °F (36.5 °C)   Resp 18   Wt  199 lb 6 oz (90.4 kg)   LMP  (LMP Unknown)   SpO2 97%   BMI 34.22 kg/m²   GENERAL: well developed, well nourished,in no apparent distress  HEENT: atraumatic, normocephalic  NECK: supple,no adenopathy  LUNGS: normal rate without respiratory distress, lungs clear to auscultation  CARDIO: RRR nl S1 S2  GI: normal bowel sounds, soft, NT/ND  EXTREMITIES: left shin with multiple linear lacerations with scabs forming, no sign of cellulitis  Calves are not tender, neg indio sign, nl pulses  Left shoulder with small hematoma and bruising  NEURO: Alert and oriented, no focal deficits, ambulating with cane due to left knee pain    ASSESSMENT AND PLAN:     Encounter Diagnoses   Name     Fall, initial encounter- mechanical fall at airport on 2[place]26, no LOC. Overall recovering well except for left knee pain      Acute pain of left knee- check xray with h/o fall, referred to PT and ortho. She can use otc tylenol prn pain.      Lacerations of multiple sites of leg, left, initial encounter- she is utd on tetanus booster, discussed local wound care. No signs of infection     History of pulmonary embolism- no signs or symptoms of PE, continue to monitor        No orders of the defined types were placed in this encounter.      Meds & Refills for this Visit:  Requested Prescriptions      No prescriptions requested or ordered in this encounter       Imaging & Consults:  OP REFERRAL TO [facility] - INTERNAL    Return if symptoms worsen or fail to improve.  There are no Patient Instructions on file for this visit.      The patient indicates understanding of these issues and agrees to the plan.

## 2024-03-10 PROBLEM — S81.812A: Status: ACTIVE | Noted: 2024-03-10

## 2024-03-12 ENCOUNTER — TELEPHONE (OUTPATIENT)
Dept: ORTHOPEDICS CLINIC | Facility: CLINIC | Age: 74
End: 2024-03-12

## 2024-03-12 NOTE — TELEPHONE ENCOUNTER
Patient called for left knee pain. Xrays in epic. Please advise if additional needed   Future Appointments   Date Time Provider Department Center   3/18/2024 10:20 AM Ayad Ritchie PA-C [facility]   6/5/2024 10:00 AM Bela Patel APRN [facility]

## 2024-03-18 ENCOUNTER — OFFICE VISIT (OUTPATIENT)
Dept: ORTHOPEDICS CLINIC | Facility: CLINIC | Age: 74
End: 2024-03-18
Payer: MEDICARE

## 2024-03-18 VITALS — BODY MASS INDEX: 33.97 KG/M2 | HEIGHT: 64 IN | WEIGHT: 199 LBS

## 2024-03-18 DIAGNOSIS — M76.52 PATELLAR TENDINITIS, LEFT KNEE: Primary | ICD-10-CM

## 2024-03-18 PROCEDURE — 99213 OFFICE O/P EST LOW 20 MIN: CPT | Performed by: PHYSICIAN ASSISTANT

## 2024-03-18 RX ORDER — DICLOFENAC SODIUM 75 MG/1
75 TABLET, DELAYED RELEASE ORAL 2 TIMES DAILY WITH MEALS
Qty: 60 TABLET | Refills: 0 | Status: SHIPPED | OUTPATIENT
Start: 2024-03-18

## 2024-03-18 NOTE — PROGRESS NOTES
[facility] Progress Note    Subjective: Patient returns to clinic after last being seen by Dr. Cameron on 11/30/2022 for right knee pain.  She reports that recently she was on vacation in the [facility] and in the airport on the way to the [facility], she fell injuring multiple aspects of the body.  She ultimately was able to enjoy the vacation but on 3/7/2024 at the airport, she felt the left knee give out on her while she was standing in the shuttle.  She felt pain deep within the knee.  After the incident, she denies any swelling that developed.  Most of her pain now is occurring along the anterior knee and she points along the length of the patellar tendon.  The pain worsens with getting up from a seated position or walking.  She does not take any medications for pain.  She feels that the pain in the left knee is similar to that in 2021 when she had a hemarthrosis aspirated by Dr. Harrison.  An MRI was obtained of the left knee at that time which demonstrated patellar tendinopathy.    Objective: Patient is seated comfortably in the exam chair.  Alert to x 3.  Nonlabored breathing.  Gross neurologically intact.  Knee without any redness, warmth, or effusion.  No tenderness palpation along the bilateral joint lines.  There is some tenderness along the patellar tendon.    Imaging: X-rays of the left knee obtained on 3/8/24 personally viewed, independently interpreted and radiology report read.  Radiographs demonstrate mild medial compartmental joint space narrowing but otherwise no significant degenerative changes appreciated.    Assessment/Plan: 73-year-old female with symptoms most consistent with symptomatic patellar tendinopathy of the left knee.  I discussed with the patient treatment options including targeted stretches and strengthening exercises for the muscles around the knee.  I also discussed measures to reduce the inflammation of the knee including use of a prescription NSAID for which diclofenac was  sent to her pharmacy.  I recommended she reach out to our office through [facility] if no significant relief is felt with the physician directed exercises, which point we can send for physical therapy referral.  The patient's questions were sought and answered satisfactorily.  She is happy with the plan and will follow as advised.    X-rays needed at next visit: None        Ayad Ritchie PA-C  [facility]    This note was dictated using Dragon software.  While it was briefly proofread prior to completion, some grammatical, spelling, and word choice errors due to dictation may still occur.

## 2024-04-10 ENCOUNTER — PATIENT MESSAGE (OUTPATIENT)
Dept: ORTHOPEDICS CLINIC | Facility: CLINIC | Age: 74
End: 2024-04-10

## 2024-04-10 NOTE — TELEPHONE ENCOUNTER
From: Allyn Delarosa  To: Ayad Ritchie  Sent: 4/10/2024 2:16 PM CDT  Subject: Left Knee Pain    I saw you in early March for L. Knee pain. You gave me Dicofenac & exercises. Suggested it was patellar tendonopathy. The knee has not improved. I’m not convinced it is a patellar tendon problem. The last time I had this issue PT did not help. But when I had it in the R knee in Dec ‘22 I got a shot and that seemed to help settle it down. Is it possible I could need a shot with this L knee now?

## 2024-04-10 NOTE — TELEPHONE ENCOUNTER
LOV 3/18/24  Left knee pain has not improved despite diclofenac and exercises.   Pt requesting steroid injection. This was not discussed as part of plan.  Pt does not want to do PT.    Conservative measures given, recommended follow up visit for re-eval. Can discuss possible steroid injection with Ayad at the visit.

## 2024-04-18 DIAGNOSIS — M76.52 PATELLAR TENDINITIS, LEFT KNEE: ICD-10-CM

## 2024-04-18 RX ORDER — DICLOFENAC SODIUM 75 MG/1
75 TABLET, DELAYED RELEASE ORAL 2 TIMES DAILY
Qty: 60 TABLET | Refills: 0 | Status: SHIPPED | OUTPATIENT
Start: 2024-04-18

## 2024-04-18 NOTE — TELEPHONE ENCOUNTER
Diclofenac  DOS: N/A  Last OV: 03/18/27  Last refill date: 03/18/24     #/refills: 60/0  Upcoming appt:   Future Appointments   Date Time Provider Department Center   6/5/2024 10:00 AM Bela Patel APRN [facility]     06/02/23  BUN  7 - 18 mg/dL 20 High    Creatinine  0.55 - 1.02 mg/dL 0.87           eGFR-Cr  >=60 mL/min/1.73m2 70   AST  15 - 37 U/L 24   ALT  13 - 56 U/L 28

## 2024-05-13 ENCOUNTER — TELEPHONE (OUTPATIENT)
Dept: INTERNAL MEDICINE CLINIC | Facility: CLINIC | Age: 74
End: 2024-05-13

## 2024-05-13 DIAGNOSIS — Z00.00 ROUTINE GENERAL MEDICAL EXAMINATION AT A HEALTH CARE FACILITY: Primary | ICD-10-CM

## 2024-05-13 DIAGNOSIS — E03.9 HYPOTHYROIDISM (ACQUIRED): ICD-10-CM

## 2024-05-13 DIAGNOSIS — E78.00 PURE HYPERCHOLESTEROLEMIA: ICD-10-CM

## 2024-05-14 RX ORDER — CITALOPRAM 40 MG/1
40 TABLET ORAL DAILY
Qty: 90 TABLET | Refills: 0 | Status: SHIPPED | OUTPATIENT
Start: 2024-05-14

## 2024-05-14 NOTE — TELEPHONE ENCOUNTER
Refill passed per [facility] protocol.    Please review pended refill request as unable to refill due to high/very high interaction warning copied here:      High  Drug-Drug: diclofenac and citalopramToxic effects may be increased with concurrent administration of Diclofenac (Oral) and Selective Serotonin Reuptake Inhibitors. The risk of upper gastrointestinal bleeding may be increased. Patients taking both drugs concurrently should be educated about the signs and symptoms of GI bleeding.  Details  Override reason        citalopram 40 MG Oral Tab  Prescription. Reordered.  diclofenac 75 MG Oral Tab ECPrescription. Active.  Discontinue  High  High Dose: citalopram, 40 mg, Oral, DailySingle dose of 40 mg exceeds recommended maximum of 20 mg by 100%  Daily dose of 40 mg exceeds recommended maximum of 20 mg by 100%  Details  Override reason        citalopram 40 MG Oral Tab  Prescriptio  Requested Prescriptions   Pending Prescriptions Disp Refills    citalopram 40 MG Oral Tab 90 tablet 3     Sig: Take 1 tablet (40 mg total) by mouth daily.       Psychiatric Non-Scheduled (Anti-Anxiety) Passed - 5/12/2024  6:15 PM        Passed - In person appointment or virtual visit in the past 6 mos or appointment in next 3 mos     Recent Outpatient Visits              1 month ago Patellar tendinitis, left knee    [facility], [address] [place] [address], [place]    Office Visit    2 months ago Lacerations of multiple sites of leg, left, initial encounter    [facility], [address] [place] [address] Veronica Yost MD    Office Visit    6 months ago Family history of colon cancer    [facility],  [facility] Yvonne Sexton DO    Office Visit    7 months ago Other acute gastritis without hemorrhage    [facility], [address] [place] [address]    Nurse Only    8 months ago Esophageal dysphagia    [facility],  [facility] Yvonne Sexton,     Office Visit           Future Appointments         Provider Department Appt Notes    In 2 days REF [facility], [facility], [place] Blood draw    In 3 weeks Bela Patel APRN [place] [id number] last 6/2023                    Passed - Depression Screening completed within the past 12 months           Recent Outpatient Visits              1 month ago Patellar tendinitis, left knee    [address] Ayad Ritchie PA-C    Office Visit    2 months ago Lacerations of multiple sites of leg, left, initial encounter    [address] Veronica Yost MD    Office Visit    6 months ago Family history of colon cancer    [facility],  [facility] Yvonne Sexton DO    Office Visit    7 months ago Other acute gastritis without hemorrhage    [facility], [address] [place] [address]    Nurse Only    8 months ago Esophageal dysphagia    [facility]enter[facility],  [facility] Yvonne Sexton,     Office Visit          Future Appointments         Provider Department Appt Notes    In 2 days REF [facility], [facility], [place] Blood draw    In 3 weeks Bela Patel APRN [facility], [address] [place] [address] last 6/2023

## 2024-05-16 ENCOUNTER — LAB ENCOUNTER (OUTPATIENT)
Dept: LAB | Age: 74
End: 2024-05-16
Attending: NURSE PRACTITIONER

## 2024-05-16 DIAGNOSIS — Z00.00 ROUTINE GENERAL MEDICAL EXAMINATION AT A HEALTH CARE FACILITY: ICD-10-CM

## 2024-05-16 DIAGNOSIS — E78.00 PURE HYPERCHOLESTEROLEMIA: ICD-10-CM

## 2024-05-16 DIAGNOSIS — E03.9 HYPOTHYROIDISM (ACQUIRED): ICD-10-CM

## 2024-05-16 LAB
ALBUMIN SERPL-MCNC: 4 G/DL (ref 3.4–5)
ALBUMIN/GLOB SERPL: 1.3 {RATIO} (ref 1–2)
ALP LIVER SERPL-CCNC: 78 U/L
ALT SERPL-CCNC: 31 U/L
ANION GAP SERPL CALC-SCNC: 4 MMOL/L (ref 0–18)
AST SERPL-CCNC: 24 U/L (ref 15–37)
BASOPHILS # BLD AUTO: 0.03 X10(3) UL (ref 0–0.2)
BASOPHILS NFR BLD AUTO: 0.7 %
BILIRUB SERPL-MCNC: 0.5 MG/DL (ref 0.1–2)
BUN BLD-MCNC: 17 MG/DL (ref 9–23)
CALCIUM BLD-MCNC: 9 MG/DL (ref 8.5–10.1)
CHLORIDE SERPL-SCNC: 103 MMOL/L (ref 98–112)
CHOLEST SERPL-MCNC: 209 MG/DL (ref ?–200)
CO2 SERPL-SCNC: 27 MMOL/L (ref 21–32)
CREAT BLD-MCNC: 0.99 MG/DL
EGFRCR SERPLBLD CKD-EPI 2021: 59 ML/MIN/1.73M2 (ref 60–?)
EOSINOPHIL # BLD AUTO: 0.16 X10(3) UL (ref 0–0.7)
EOSINOPHIL NFR BLD AUTO: 3.7 %
ERYTHROCYTE [DISTWIDTH] IN BLOOD BY AUTOMATED COUNT: 12.9 %
FASTING PATIENT LIPID ANSWER: YES
FASTING STATUS PATIENT QL REPORTED: YES
GLOBULIN PLAS-MCNC: 3.2 G/DL (ref 2.8–4.4)
GLUCOSE BLD-MCNC: 88 MG/DL (ref 70–99)
HCT VFR BLD AUTO: 38.6 %
HDLC SERPL-MCNC: 62 MG/DL (ref 40–59)
HGB BLD-MCNC: 12.4 G/DL
IMM GRANULOCYTES # BLD AUTO: 0.01 X10(3) UL (ref 0–1)
IMM GRANULOCYTES NFR BLD: 0.2 %
LDLC SERPL CALC-MCNC: 125 MG/DL (ref ?–100)
LYMPHOCYTES # BLD AUTO: 1.83 X10(3) UL (ref 1–4)
LYMPHOCYTES NFR BLD AUTO: 42 %
MCH RBC QN AUTO: 30 PG (ref 26–34)
MCHC RBC AUTO-ENTMCNC: 32.1 G/DL (ref 31–37)
MCV RBC AUTO: 93.5 FL
MONOCYTES # BLD AUTO: 0.38 X10(3) UL (ref 0.1–1)
MONOCYTES NFR BLD AUTO: 8.7 %
NEUTROPHILS # BLD AUTO: 1.95 X10 (3) UL (ref 1.5–7.7)
NEUTROPHILS # BLD AUTO: 1.95 X10(3) UL (ref 1.5–7.7)
NEUTROPHILS NFR BLD AUTO: 44.7 %
NONHDLC SERPL-MCNC: 147 MG/DL (ref ?–130)
OSMOLALITY SERPL CALC.SUM OF ELEC: 279 MOSM/KG (ref 275–295)
PLATELET # BLD AUTO: 279 10(3)UL (ref 150–450)
POTASSIUM SERPL-SCNC: 4.6 MMOL/L (ref 3.5–5.1)
PROT SERPL-MCNC: 7.2 G/DL (ref 6.4–8.2)
RBC # BLD AUTO: 4.13 X10(6)UL
SODIUM SERPL-SCNC: 134 MMOL/L (ref 136–145)
TRIGL SERPL-MCNC: 126 MG/DL (ref 30–149)
TSI SER-ACNC: 0.83 MIU/ML (ref 0.36–3.74)
VLDLC SERPL CALC-MCNC: 22 MG/DL (ref 0–30)
WBC # BLD AUTO: 4.4 X10(3) UL (ref 4–11)

## 2024-05-16 PROCEDURE — 36415 COLL VENOUS BLD VENIPUNCTURE: CPT

## 2024-05-16 PROCEDURE — 85025 COMPLETE CBC W/AUTO DIFF WBC: CPT

## 2024-05-16 PROCEDURE — 80061 LIPID PANEL: CPT

## 2024-05-16 PROCEDURE — 80053 COMPREHEN METABOLIC PANEL: CPT

## 2024-05-16 PROCEDURE — 84443 ASSAY THYROID STIM HORMONE: CPT

## 2024-06-04 PROBLEM — Z12.11 SPECIAL SCREENING FOR MALIGNANT NEOPLASM OF COLON: Status: RESOLVED | Noted: 2024-02-20 | Resolved: 2024-06-04

## 2024-06-04 PROBLEM — S81.812A: Status: RESOLVED | Noted: 2024-03-10 | Resolved: 2024-06-04

## 2024-06-04 NOTE — PROGRESS NOTES
Subjective:   Allyn Delarosa is a 73 year old female who presents for a Medicare Subsequent Annual Wellness visit (Pt already had Initial Annual Wellness) and Here for AWV and follow up of chronic health issues.  .     Anxiety depression.  Citalopram  Joint pain  knee  diclofenac PRN  caution GI upset  GERD/  dysphagia much improved.     SGI  Prevacid PRN.    Hypothyroid.  Levothryoxne 88mcg  stable    Dyslipidemia.  Fenofibrate.  Trig 126   with total 209  HDL 62    Cardiology has seen Dr Phelan in the past.      Dr Whitehead  urology  methenamine.      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 If needed will return to Dr Najjar.      History/Other:   Fall Risk Assessment:   She has been screened for Falls and is High Risk. Fall Prevention information provided to patient in After Visit Summary.    Do you feel unsteady when standing or walking?: Yes  Do you worry about falling?: Yes  Have you fallen in the past year?: Yes  How many times have you fallen?: (P) 1  Were you injured?: (P) Yes     Cognitive Assessment:   She had a completely normal cognitive assessment - see flowsheet entries       Functional Ability/Status:   Allyn Delarosa has a completely normal functional assessment. See flowsheet for details.      Depression Screening (PHQ-2/PHQ-9): PHQ-2 SCORE: 0  , done 6/5/2024   Last [place] Suicide Screening on 6/5/2024 was No Risk.         Advanced Directives:   She does NOT have a Living Will. [Do you have a living will?: No]  She does NOT have a Power of  for Health Care. [Do you have a healthcare power of ?: No]  Requeted POA helalthcare forms given       Patient Active Problem List   Diagnosis    Pain in joint, lower leg    Pain in joint, pelvic region and thigh    Pure hypercholesterolemia    Depression    Hypothyroidism (acquired)    Anxiety state    Enthesopathy of hip region    Osteopenia    Neurogenic bladder    Chronic retention of urine    Slow transit constipation     History of pulmonary embolism    Pulmonary nodules    Primary osteoarthritis of left knee    Normocytic anemia    Hemarthrosis of left knee    History of acute pancreatitis    S/P laparoscopic cholecystectomy    Adenoma of left adrenal gland    Thrombosis of splenic artery (HCC)    Celiac artery dilatation (HCC)    History of basal cell carcinoma (BCC)    Dysphagia    Gastroesophageal reflux disease    Abdominal bloating    Family history of colon cancer     Allergies:  She is allergic to bactrim [sulfamethoxazole w/trimethoprim] and levaquin [levofloxacin].    Current Medications:  Outpatient Medications Marked as Taking for the 6/5/24 encounter (Office Visit) with Bela Patel APRN   Medication Sig    citalopram 40 MG Oral Tab Take 1 tablet (40 mg total) by mouth daily.    diclofenac 75 MG Oral Tab EC Take 1 tablet (75 mg total) by mouth 2 (two) times daily.    lansoprazole 30 MG Oral Capsule Delayed Release Take 1 capsule (30 mg total) by mouth before breakfast.    levothyroxine 88 MCG Oral Tab Take 1 tablet (88 mcg total) by mouth before breakfast.    LORazepam 1 MG Oral Tab Take 1 tablet (1 mg total) by mouth daily as needed.    Fenofibrate 160 MG Oral Tab Take 1 tablet (160 mg total) by mouth daily.    METHENAMINE 1 g Oral Tab TAKE 1 TABLET(1 GRAM) BY MOUTH TWICE DAILY       Medical History:  She  has a past medical history of Abdominal distention (2022), Abdominal hernia, Abdominal pain (2021), Anxiety, Arthritis, Belching (2022), Bloating (2020), Blood in the stool, Chest pain, Chronic cough, Constipation, Depression, Diarrhea, unspecified (Occasionally), Disorder of thyroid, Easy bruising, Esophageal reflux, Fatigue, Flatulence/gas pain/belching (2020), Headache disorder (Lifelong), Heartburn (2020), Hemorrhoids, High cholesterol, History of depression, Hyperlipidemia, Hypothyroidism, Indigestion (2020), Irregular bowel habits (2022), Osteoarthritis, Pain with bowel movements (At times with  constipation), Painful swallowing (11/2022), Painful urination (2017), Problems with swallowing, Pulmonary embolism (HCC) (05/2021), Shortness of breath (05/09/2021), Sleep disturbance (2022), Thyroid disease, Uncomfortable fullness after meals (2022), Urinary retention, Visual impairment, Wears glasses, Weight gain, and Wheezing.  Surgical History:  She  has a past surgical history that includes colonoscopy (09/21/2007); breast surgery procedure unlisted (10/2000); hysterectomy (1994); needle biopsy right (2001); colonoscopy (2000); other; other (02/08/2017); other surgical history (04/24/2018); colonoscopy (06/12/2018); lumpectomy right (2001); removal gallbladder; oophorectomy (Bilateral, 1994); cholecystectomy (2021); and total abdom hysterectomy.   Family History:  Her family history includes Alcohol and Other Disorders Associated in her maternal grandfather; Breast Cancer (age of onset: 45) in her sister; Breast Cancer (age of onset: 52) in her self; Cancer in her brother, brother, brother, father, sister, and sister; Colon Cancer in her brother; DCIS in her self and sister; Diabetes in her brother and maternal grandmother; HIV in her brother; Heart Disease in her mother; Hypertension in her brother, brother, brother, brother, father, mother, sister, and sister; Prostate Cancer in her brother and sister; Stroke in her paternal grandfather; Uterine Cancer in her paternal uncle.  Social History:  She  reports that she has never smoked. She has never used smokeless tobacco. She reports that she does not currently use alcohol. She reports that she does not currently use drugs after having used the following drugs: Cannabis.    Tobacco:  She has never smoked tobacco.    CAGE Alcohol Screen:   CAGE screening score of 0 on 6/2/2024, showing low risk of alcohol abuse.      Patient Care Team:  Veronica Yost MD as PCP - General (Internal Medicine)  Bela Patel APRN (Internal Medicine)  Bradford Mccray PT as Physical  Therapist (Physical Therapy)  Alessia Tamez, Joe Barnes PT as Physical Therapist    Review of Systems     Negative except as above    Objective:   Physical Exam  Physical Exam  General Appearance:  Alert, cooperative, no distress, appears stated age   Head:  Normocephalic, without obvious abnormality, atraumatic   Eyes:  PERRL, conjunctiva/corneas clear, EOM's intact both eyes   Ears:  Normal TM's and external ear canals, both ears   Nose: Nares normal, septum midline,mucosa normal, no drainage or sinus tenderness   Throat: Lips, mucosa, and tongue normal; teeth and gums normal   Neck: Supple, symmetrical, trachea midline, no adenopathy;  thyroid: not enlarged, symmetric, no JVD   Back:   Symmetric, no curvature, ROM normal, no CVA tenderness   Lungs:   Clear to auscultation bilaterally, respirations unlabored   Heart:  Regular rate and rhythm, S1 and S2 normal,    Abdomen:   Soft, non-tender, bowel sounds active all four quadrants,  no masses,    Extremities: Extremities normal, atraumatic, no edema   Pulses: symmetric   Skin: Skin color, texture, turgor normal,    Lymph nodes: Cervical, supraclavicular nodes normal   Neurologic: Normal             /72 (BP Location: Left arm, Patient Position: Sitting, Cuff Size: large)   Pulse 76   Temp 97.4 °F (36.3 °C) (Temporal)   Resp 18   Ht 5' 3\" (1.6 m)   Wt 193 lb 6.4 oz (87.7 kg)   LMP  (LMP Unknown)   SpO2 99%   BMI 34.26 kg/m²  Estimated body mass index is 34.26 kg/m² as calculated from the following:    Height as of this encounter: 5' 3\" (1.6 m).    Weight as of this encounter: 193 lb 6.4 oz (87.7 kg).    Medicare Hearing Assessment:   Hearing Screening    Time taken: 6/5/2024  9:57 AM  Entry User: Crystal Rod MA  Screening Method: Finger Rub  Finger Rub Result: Pass         Visual Acuity:   Right Eye Visual Acuity: Corrected Right Eye Chart Acuity: 20/30   Left Eye Visual Acuity: Corrected Left Eye Chart Acuity: 20/25   Both Eyes Visual  Acuity: Corrected Both Eyes Chart Acuity: 20/25   Able To Tolerate Visual Acuity: Yes        Assessment & Plan:   Allyn Delarosa is a 73 year old female who presents for a Medicare Assessment.     1. Routine general medical examination at a health care facility (Primary)  2. Celiac artery dilatation (HCC)  agrees to US. Dr Najjar if needed.   -     US ABDOMEN (MESENTERIC/CELIAC) COMPLETE WITH DOPPLER(CPT=76700/05421); Future; Expected date: 06/05/2024  3. Thrombosis of splenic artery (HCC   check US  vascular f/u if needed.   4. Pure hypercholesterolemia  stable  fenofibrate.   5. Pulmonary nodules  stable  low risk.  Nonsmoker   6. Hypothyroidism (acquired)  stable  levothyroxine 88mcg  7. Anxiety state  stable  citalopram.   8. Mild episode of recurrent major depressive disorder (HCC)  stable citalopram    9. Abdominal bloating  stalbe  improved. Monitor.   10. Dysphagia, unspecified type  greatly improved.  Monitor.prevacid.   11. Gastroesophageal reflux disease, unspecified whether esophagitis present  stable  greatly improved.  Monitor  prevacid pRN  12. History of acute pancreatitis  stable  monitor.   13. S/P laparoscopic cholecystectomy  stable  monitor  14. Slow transit constipation  stable  monitor.   15. Osteopenia, unspecified location  stable Continue weight bearing exercises and vit d supplement.  Dexa ordered.   -     XR DEXA BONE DENSITOMETRY (CPT=77080); Future; Expected date: 06/05/2024  16. Arthralgia of left lower leg   stable  monitor   17. Primary osteoarthritis of left knee  stable monitor.   18. Chronic retention of urine  stable monitor. Per Dr Whitehead.   19. Neurogenic bladder  stable  monitorDr [place]ura.  20. History of pulmonary embolism  stable  monitor.   21. Adenoma of left adrenal gland  stable  defers imaging at this time.   22. Family history of colon cancer  stable monitor.   23. History of basal cell carcinoma (BCC)  stable  monitor.   24. Normocytic anemia  stable recent labs.   Monitor.   25. Postmenopausal  stable  monitor.   -     XR DEXA BONE DENSITOMETRY (CPT=77080); Future; Expected date: 06/05/2024  26. Encounter for screening mammogram for malignant neoplasm of breast  -     [facility] LUIS 2D+3D SCREENING BILAT (CPT=77067/63211); Future; Expected date: 06/05/2024    The patient indicates understanding of these issues and agrees to the plan.        No follow-ups on file.     PATTI Bermudez, 6/4/2024     Supplementary Documentation:   General Health:  In the past six months, have you lost more than 10 pounds without trying?: 2 - No  Has your appetite been poor?: No  Type of Diet: Balanced  How does the patient maintain a good energy level?: Other  How would you describe your daily physical activity?: Light  How would you describe your current health state?: Good  How do you maintain positive mental well-being?: Social Interaction;Puzzles;Games;Visiting Friends;Visiting Family  On a scale of 0 to 10, with 0 being no pain and 10 being severe pain, what is your pain level?: 2 - (Mild)  In the past six months, have you experienced urine leakage?: 0-No  At any time do you feel concerned for the safety/well-being of yourself and/or your children, in your home or elsewhere?: No  Have you had any immunizations at another office such as Influenza, Hepatitis B, Tetanus, or Pneumococcal?: No       Allyn Delarosa's SCREENING SCHEDULE   Tests on this list are recommended by your physician but may not be covered, or covered at this frequency, by your insurer.   Please check with your insurance carrier before scheduling to verify coverage.   PREVENTATIVE SERVICES FREQUENCY &  COVERAGE DETAILS LAST COMPLETION DATE   Diabetes Screening    Fasting Blood Sugar /  Glucose    One screening every 12 months if never tested or if previously tested but not diagnosed with pre-diabetes   One screening every 6 months if diagnosed with pre-diabetes Lab Results   Component Value Date    GLU 88 05/16/2024         Cardiovascular Disease Screening    Lipid Panel  Cholesterol  Lipoprotein (HDL)  Triglycerides Covered every 5 years for all Medicare beneficiaries without apparent signs or symptoms of cardiovascular disease Lab Results   Component Value Date    CHOLEST 209 (H) 05/16/2024    HDL 62 (H) 05/16/2024     (H) 05/16/2024    TRIG 126 05/16/2024         Electrocardiogram (EKG)   Covered if needed at Welcome to Medicare, and non-screening if indicated for medical reasons 03/04/2022      Ultrasound Screening for Abdominal Aortic Aneurysm (AAA) Covered once in a lifetime for one of the following risk factors    Men who are 65-75 years old and have ever smoked    Anyone with a family history -     Colorectal Cancer Screening  Covered for ages 50-85; only need ONE of the following:    Colonoscopy   Covered every 10 years    Covered every 2 years if patient is at high risk or previous colonoscopy was abnormal 02/20/2024    Health Maintenance   Topic Date Due    Colorectal Cancer Screening  02/20/2029       Flexible Sigmoidoscopy   Covered every 4 years -    Fecal Occult Blood Test Covered annually -   Bone Density Screening    Bone density screening    Covered every 2 years after age 65 if diagnosed with risk of osteoporosis or estrogen deficiency.    Covered yearly for long-term glucocorticoid medication use (Steroids) Last Dexa Scan:    XR DEXA BONE DENSITOMETRY (CPT=77080) 07/21/2021      No recommendations at this time   Pap and Pelvic    Pap   Covered every 2 years for women at normal risk; Annually if at high risk -  No recommendations at this time    Chlamydia Annually if high risk -  No recommendations at this time   Screening Mammogram    Mammogram     Recommend annually for all female patients aged 40 and older    One baseline mammogram covered for patients aged 35-39 08/10/2023    Health Maintenance   Topic Date Due    Mammogram  Discontinued       Immunizations    Influenza Covered once per flu  season  Please get every year 10/09/2023  No recommendations at this time    Pneumococcal Each vaccine (Jsdieih03 & Olbmvlavp00) covered once after 65 Prevnar 13: 03/14/2016    [id number]: 02/24/2014     No recommendations at this time    Hepatitis B One screening covered for patients with certain risk factors   -  No recommendations at this time    Tetanus Toxoid Not covered by Medicare Part B unless medically necessary (cut with metal); may be covered with your pharmacy prescription benefits -    Tetanus, Diptheria and Pertusis TD and TDaP Not covered by Medicare Part B -  No recommendations at this time    Zoster Not covered by Medicare Part B; may be covered with your pharmacy  prescription benefits 01/29/2013  No recommendations at this time

## 2024-06-05 ENCOUNTER — OFFICE VISIT (OUTPATIENT)
Dept: INTERNAL MEDICINE CLINIC | Facility: CLINIC | Age: 74
End: 2024-06-05
Payer: MEDICARE

## 2024-06-05 VITALS
BODY MASS INDEX: 34.27 KG/M2 | SYSTOLIC BLOOD PRESSURE: 128 MMHG | HEIGHT: 63 IN | HEART RATE: 76 BPM | TEMPERATURE: 97 F | RESPIRATION RATE: 18 BRPM | OXYGEN SATURATION: 99 % | WEIGHT: 193.38 LBS | DIASTOLIC BLOOD PRESSURE: 72 MMHG

## 2024-06-05 DIAGNOSIS — I74.8 THROMBOSIS OF SPLENIC ARTERY (HCC): ICD-10-CM

## 2024-06-05 DIAGNOSIS — R33.9 CHRONIC RETENTION OF URINE: ICD-10-CM

## 2024-06-05 DIAGNOSIS — N31.9 NEUROGENIC BLADDER: ICD-10-CM

## 2024-06-05 DIAGNOSIS — D64.9 NORMOCYTIC ANEMIA: ICD-10-CM

## 2024-06-05 DIAGNOSIS — Z85.828 HISTORY OF BASAL CELL CARCINOMA (BCC): ICD-10-CM

## 2024-06-05 DIAGNOSIS — M25.562 ARTHRALGIA OF LEFT LOWER LEG: ICD-10-CM

## 2024-06-05 DIAGNOSIS — I72.8 CELIAC ARTERY DILATATION (HCC): ICD-10-CM

## 2024-06-05 DIAGNOSIS — Z78.0 POSTMENOPAUSAL: ICD-10-CM

## 2024-06-05 DIAGNOSIS — Z86.711 HISTORY OF PULMONARY EMBOLISM: ICD-10-CM

## 2024-06-05 DIAGNOSIS — M17.12 PRIMARY OSTEOARTHRITIS OF LEFT KNEE: ICD-10-CM

## 2024-06-05 DIAGNOSIS — Z87.19 HISTORY OF ACUTE PANCREATITIS: ICD-10-CM

## 2024-06-05 DIAGNOSIS — Z80.0 FAMILY HISTORY OF COLON CANCER: ICD-10-CM

## 2024-06-05 DIAGNOSIS — M85.80 OSTEOPENIA, UNSPECIFIED LOCATION: ICD-10-CM

## 2024-06-05 DIAGNOSIS — R91.8 PULMONARY NODULES: ICD-10-CM

## 2024-06-05 DIAGNOSIS — F41.1 ANXIETY STATE: ICD-10-CM

## 2024-06-05 DIAGNOSIS — Z90.49 S/P LAPAROSCOPIC CHOLECYSTECTOMY: ICD-10-CM

## 2024-06-05 DIAGNOSIS — R14.1 ABDOMINAL GAS PAIN: ICD-10-CM

## 2024-06-05 DIAGNOSIS — E03.9 HYPOTHYROIDISM (ACQUIRED): ICD-10-CM

## 2024-06-05 DIAGNOSIS — K59.01 SLOW TRANSIT CONSTIPATION: ICD-10-CM

## 2024-06-05 DIAGNOSIS — E78.00 PURE HYPERCHOLESTEROLEMIA: ICD-10-CM

## 2024-06-05 DIAGNOSIS — K21.9 GASTROESOPHAGEAL REFLUX DISEASE, UNSPECIFIED WHETHER ESOPHAGITIS PRESENT: ICD-10-CM

## 2024-06-05 DIAGNOSIS — F33.0 MILD EPISODE OF RECURRENT MAJOR DEPRESSIVE DISORDER (HCC): ICD-10-CM

## 2024-06-05 DIAGNOSIS — Z12.31 ENCOUNTER FOR SCREENING MAMMOGRAM FOR MALIGNANT NEOPLASM OF BREAST: ICD-10-CM

## 2024-06-05 DIAGNOSIS — D35.02 ADENOMA OF LEFT ADRENAL GLAND: ICD-10-CM

## 2024-06-05 DIAGNOSIS — R13.10 DYSPHAGIA, UNSPECIFIED TYPE: ICD-10-CM

## 2024-06-05 DIAGNOSIS — Z00.00 ROUTINE GENERAL MEDICAL EXAMINATION AT A HEALTH CARE FACILITY: Primary | ICD-10-CM

## 2024-06-05 PROCEDURE — 99214 OFFICE O/P EST MOD 30 MIN: CPT | Performed by: NURSE PRACTITIONER

## 2024-06-05 PROCEDURE — G0439 PPPS, SUBSEQ VISIT: HCPCS | Performed by: NURSE PRACTITIONER

## 2024-06-26 ENCOUNTER — HOSPITAL ENCOUNTER (OUTPATIENT)
Dept: BONE DENSITY | Age: 74
Discharge: HOME | End: 2024-06-26
Attending: NURSE PRACTITIONER

## 2024-06-26 DIAGNOSIS — M85.80 OSTEOPENIA, UNSPECIFIED LOCATION: ICD-10-CM

## 2024-06-26 DIAGNOSIS — Z78.0 POSTMENOPAUSAL: ICD-10-CM

## 2024-06-26 PROCEDURE — 77080 DXA BONE DENSITY AXIAL: CPT | Performed by: NURSE PRACTITIONER

## 2024-07-11 ENCOUNTER — HOSPITAL ENCOUNTER (OUTPATIENT)
Dept: ULTRASOUND IMAGING | Age: 74
Discharge: HOME | End: 2024-07-11
Attending: NURSE PRACTITIONER
Payer: MEDICARE

## 2024-07-11 DIAGNOSIS — I72.8 CELIAC ARTERY DILATATION (HCC): ICD-10-CM

## 2024-07-11 PROCEDURE — 76700 US EXAM ABDOM COMPLETE: CPT | Performed by: NURSE PRACTITIONER

## 2024-07-11 PROCEDURE — 93975 VASCULAR STUDY: CPT | Performed by: NURSE PRACTITIONER

## 2024-07-17 RX ORDER — LEVOTHYROXINE SODIUM 88 UG/1
88 TABLET ORAL
Qty: 90 TABLET | Refills: 3 | Status: SHIPPED | OUTPATIENT
Start: 2024-07-17

## 2024-07-17 NOTE — TELEPHONE ENCOUNTER
Refill passed per [facility] protocol.  Requested Prescriptions   Pending Prescriptions Disp Refills    levothyroxine 88 MCG Oral Tab 90 tablet 1     Sig: Take 1 tablet (88 mcg total) by mouth before breakfast.       Thyroid Medication Protocol Passed - 7/13/2024  7:22 AM        Passed - TSH in past 12 months        Passed - Last TSH value is normal     Lab Results   Component Value Date    TSH 0.829 05/16/2024                 Passed - In person appointment or virtual visit in the past 12 mos or appointment in next 3 mos     Recent Outpatient Visits              1 month ago Routine general medical examination at a health care facility    [address]    Office Visit    4 months ago Patellar tendinitis, left knee    [address] Joseph, PA-C    Office Visit    4 months ago Lacerations of multiple sites of leg, left, initial encounter    [address] Veronica Gupta MD    Office Visit    8 months ago Family history of colon cancer    [facility],  [facility] Yvonne Sexton DO    Office Visit    10 months ago Other acute gastritis without hemorrhage    [facility], [address] [place] [address]    Nurse Only          Future Appointments         Provider Department Appt Notes    In 4 weeks [address] - [place]                        Recent Outpatient Visits              1 month ago Routine general medical examination at a health care facility    [facility], [address] [place] [address] Bela Renodn APRN    Office Visit    4 months ago Patellar tendinitis, left knee    [address], PA-C    Office Visit    4 months ago Lacerations of multiple sites of leg, left, initial encounter    [address],  Veronica Gupta MD    Office Visit    8 months ago Family history of colon cancer    [facility] G[facility]enterology,  [facility] Yvonne Sexton DO    Office Visit    10 months ago Other acute gastritis without hemorrhage    [facility], [address] [place] [address]    Nurse Only          Future Appointments         Provider Department Appt Notes    In 4 weeks [address]

## 2024-07-30 RX ORDER — FENOFIBRATE 160 MG/1
160 TABLET ORAL DAILY
Qty: 90 TABLET | Refills: 3 | Status: SHIPPED | OUTPATIENT
Start: 2024-07-30

## 2024-07-30 RX ORDER — CITALOPRAM 40 MG/1
40 TABLET ORAL DAILY
Qty: 90 TABLET | Refills: 3 | Status: SHIPPED | OUTPATIENT
Start: 2024-07-30

## 2024-07-30 NOTE — TELEPHONE ENCOUNTER
Refill passes per [facility] protocol.    No future appointments with family medicine/internal medicine.  LAST OFFICE VISIT: 6/5/2024    Requested Prescriptions   Pending Prescriptions Disp Refills    Fenofibrate 160 MG Oral Tab 90 tablet 3     Sig: Take 1 tablet (160 mg total) by mouth daily.       Cholesterol Medication Protocol Passed - 7/26/2024  6:24 AM        Passed - ALT < 80     Lab Results   Component Value Date    ALT 31 05/16/2024             Passed - ALT resulted within past year        Passed - Lipid panel within past 12 months     Lab Results   Component Value Date    CHOLEST 209 (H) 05/16/2024    TRIG 126 05/16/2024    HDL 62 (H) 05/16/2024     (H) 05/16/2024    VLDL 22 05/16/2024    TCHDLRATIO 3.37 06/08/2018    NONHDLC 147 (H) 05/16/2024             Passed - In person appointment or virtual visit in the past 12 mos or appointment in next 3 mos     Recent Outpatient Visits              1 month ago Routine general medical examination at a health care facility    [facility], [address] [place] [address], Bela Rendon APRN    Office Visit    4 months ago Patellar tendinitis, left knee    [address], Ayad Lancaster PA-C    Office Visit    4 months ago Lacerations of multiple sites of leg, left, initial encounter    [address], Veronica Gupta MD    Office Visit    8 months ago Family history of colon cancer    [facility] G[facility]enterology,  [facility] Yvonne Sexton DO    Office Visit    10 months ago Other acute gastritis without hemorrhage    [facility], [address] [place] [address]    Nurse Only          Future Appointments         Provider Department Appt Notes    In 2 weeks PF [address]                          Future Appointments         Provider Department Appt Notes    In 2 weeks [address]            Recent Outpatient Visits              1 month ago Routine general medical examination at a health care facility    [facility], [address] [place] [address], Bela Rendon APRN    Office Visit    4 months ago Patellar tendinitis, left knee    [facility], [address] [place] [address], Ayad Lancaster PA-C    Office Visit    4 months ago Lacerations of multiple sites of leg, left, initial encounter    [facility], [address] [place] [address], Veronica Gupta MD    Office Visit    8 months ago Family history of colon cancer    [facility] Gastroenterology,  [facility] Yvonne Sexton DO    Office Visit    10 months ago Other acute gastritis without hemorrhage    [facility], [address] [place] [address]    Nurse Only

## 2024-07-30 NOTE — TELEPHONE ENCOUNTER
Please review pended refill request as unable to refill due to high/very high drug interaction warning copied here:    High  Drug-Drug: diclofenac and citalopramToxic effects may be increased with concurrent administration of Diclofenac (Oral) and Selective Serotonin Reuptake Inhibitors. The risk of upper gastrointestinal bleeding may be increased. Patients taking both drugs concurrently should be educated about the signs and symptoms of GI bleeding.    High  High Dose: citalopram, 40 mg, Oral, DailySingle dose of 40 mg exceeds recommended maximum of 20 mg by 100%  Daily dose of 40 mg exceeds recommended maximum of 20 mg by 100%    Refill passes per [facility] protocol.    No future appointments with family medicine/internal medicine.  LAST OFFICE VISIT: 6/5/2024    Requested Prescriptions   Pending Prescriptions Disp Refills    citalopram 40 MG Oral Tab 90 tablet 0     Sig: Take 1 tablet (40 mg total) by mouth daily.       Psychiatric Non-Scheduled (Anti-Anxiety) Passed - 7/26/2024  6:24 AM        Passed - In person appointment or virtual visit in the past 6 mos or appointment in next 3 mos     Recent Outpatient Visits              1 month ago Routine general medical examination at a health care facility    [facility], [address] [place] [address] Bela Rendon APRN    Office Visit    4 months ago Patellar tendinitis, left knee    [address]-C    Office Visit    4 months ago Lacerations of multiple sites of leg, left, initial encounter    [address] Veronica Yost MD    Office Visit    8 months ago Family history of colon cancer    [facility],  [facility] Yvonne Sexton DO    Office Visit    10 months ago Other acute gastritis without hemorrhage    [facility], [address] [place] [address]    Nurse Only          Future Appointments         Provider Department  Appt Notes    In 2 weeks PF [address] Mammography - [place]                     Passed - Depression Screening completed within the past 12 months             Future Appointments         Provider Department Appt Notes    In 2 weeks PF [address] Mammography - [place]           Recent Outpatient Visits              1 month ago Routine general medical examination at a health care facility    [facility], [address] [place] [address], Bela Rendon APRN    Office Visit    4 months ago Patellar tendinitis, left knee    [facility], [address] [place] [address], Ayad Lancaster PA-C    Office Visit    4 months ago Lacerations of multiple sites of leg, left, initial encounter    [facility], [address] [place] [address], Veronica Gupta MD    Office Visit    8 months ago Family history of colon cancer    [facility],  [facility] Yvonne Sexton DO    Office Visit    10 months ago Other acute gastritis without hemorrhage    [facility], [address] [place] [address]    Nurse Only

## 2024-12-20 ENCOUNTER — NURSE TRIAGE (OUTPATIENT)
Dept: INTERNAL MEDICINE CLINIC | Facility: CLINIC | Age: 74
End: 2024-12-20

## 2024-12-20 NOTE — TELEPHONE ENCOUNTER
Action Requested: Summary for Provider     []  Critical Lab, Recommendations Needed  [] Need Additional Advice  []   FYI    []   Need Orders  [] Need Medications Sent to Pharmacy  []  Other     SUMMARY: Patient called to make an appointment for her toe pain (big toe and 2nd toe) and possible fungal infection.      Patient called to make an earlier appointment for her toe pain (possible neuropathy) and possible fungal infection.  States her big toe nail has split. Denies redness, swelling, drainage or fever.  Recommended home remedies, per protocol and to call back or be seen at [facility] if toe starts to develop redness, swelling, drainage or fever.  Patient verbalizes understanding.    Reason for call: Toenail  Onset: Data Unavailable      Reason for Disposition   Ingrown toenail    Protocols used: Toenail - Ingrown-A-[place]

## 2025-01-02 ENCOUNTER — OFFICE VISIT (OUTPATIENT)
Dept: INTERNAL MEDICINE CLINIC | Facility: CLINIC | Age: 75
End: 2025-01-02
Payer: MEDICARE

## 2025-01-02 VITALS
OXYGEN SATURATION: 96 % | TEMPERATURE: 97 F | DIASTOLIC BLOOD PRESSURE: 70 MMHG | HEIGHT: 63 IN | SYSTOLIC BLOOD PRESSURE: 124 MMHG | HEART RATE: 65 BPM | RESPIRATION RATE: 18 BRPM | WEIGHT: 196.19 LBS | BODY MASS INDEX: 34.76 KG/M2

## 2025-01-02 DIAGNOSIS — B35.1 ONYCHOMYCOSIS: Primary | ICD-10-CM

## 2025-01-02 PROCEDURE — 99214 OFFICE O/P EST MOD 30 MIN: CPT

## 2025-01-02 RX ORDER — FAMOTIDINE 20 MG/1
TABLET, FILM COATED ORAL
COMMUNITY
Start: 2024-11-20

## 2025-01-02 RX ORDER — CICLOPIROX 80 MG/ML
SOLUTION TOPICAL
Qty: 6.6 ML | Refills: 1 | Status: SHIPPED | OUTPATIENT
Start: 2025-01-02

## 2025-01-02 NOTE — PROGRESS NOTES
Allyn Delarosa is a 74 year old female.   Chief Complaint   Patient presents with    Toe Pain     Yb rm 16b toe problem long term      HPI:    Patient here for evaluation of toe nail discoloration involving multiple toe nails on right foot, and mild discoloration of left foot. Patient states symptoms ongoing for over a year. She denies open wounds. No redness. Has not tried over the counter medication at this time.     Allergies:  Allergies[1]   Current Meds:  Current Outpatient Medications   Medication Sig Dispense Refill    famotidine 20 MG Oral Tab       Fenofibrate 160 MG Oral Tab Take 1 tablet (160 mg total) by mouth daily. 90 tablet 3    citalopram 40 MG Oral Tab Take 1 tablet (40 mg total) by mouth daily. 90 tablet 3    levothyroxine 88 MCG Oral Tab Take 1 tablet (88 mcg total) by mouth before breakfast. 90 tablet 3    diclofenac 75 MG Oral Tab EC Take 1 tablet (75 mg total) by mouth 2 (two) times daily. 60 tablet 0    lansoprazole 30 MG Oral Capsule Delayed Release Take 1 capsule (30 mg total) by mouth before breakfast. 30 capsule 3    LORazepam 1 MG Oral Tab Take 1 tablet (1 mg total) by mouth daily as needed. 20 tablet 0    METHENAMINE 1 g Oral Tab TAKE 1 TABLET(1 GRAM) BY MOUTH TWICE DAILY 60 tablet 0        PMH:     Past Medical History:    Abdominal distention    Abdominal hernia    Hiatal & umbilical    Abdominal pain    Had pancreatitis    Anxiety    Take lorazepam occasionally    Arthritis    Belching    Bloating    Blood in the stool    At times    Chest pain    Chronic cough    Constipation    Depression    Diarrhea, unspecified    Disorder of thyroid    Easy bruising    Esophageal reflux    Take omepazole    Fatigue    When have digestive issues    Flatulence/gas pain/belching    Headache disorder    Off & on.    Heartburn    Hemorrhoids    Internal    High cholesterol    History of depression    Hyperlipidemia    Hypothyroidism    Indigestion    Irregular bowel habits    Osteoarthritis     generalized    Pain with bowel movements    Painful swallowing    Painful urination    Problems with swallowing    Pulmonary embolism (HCC)    Shortness of breath    Had pulmonary embolisms    Sleep disturbance    At times; heartburn    Thyroid disease    Uncomfortable fullness after meals    Urinary retention    surgery scheduled 02/08/17    Visual impairment    glasses    Wears glasses    Weight gain    Wheezing    At times lying down     ROS:   Review of Systems   Constitutional: Negative.    Skin: Negative.             PHYSICAL EXAM:    /70   Pulse 65   Temp 97 °F (36.1 °C) (Temporal)   Resp 18   Ht 5' 3\" (1.6 m)   Wt 196 lb 3.2 oz (89 kg)   LMP  (LMP Unknown)   SpO2 96%   BMI 34.76 kg/m²   Physical Exam  Constitutional:       Appearance: Normal appearance.   Musculoskeletal:        Feet:    Feet:      Right foot:      Toenail Condition: Right toenails are abnormally thick. Fungal disease present.     Left foot:      Toenail Condition: Left toenails are abnormally thick. Fungal disease present.  Neurological:      Mental Status: She is alert.       ASSESSMENT/ PLAN:   1. Onychomycosis  Discussed use- discussed oral options which patient declined. Specialist information provided if symptoms worsen. Discussed vicks use as well for management. Discussed importance of good foot hygiene as well to avoid spread.   - Ciclopirox 8 % External Solution; Use once daily at night  Dispense: 6.6 mL; Refill: 1  - Podiatry Referral - In Network    Health Maintenance Due   Topic Date Due    Annual Depression Screening  01/01/2025    Fall Risk Screening (Annual)  01/01/2025   Pt indicates understanding and agrees to the plan.     Follow up as needed   PATTI Higuera          [1]   Allergies  Allergen Reactions    Bactrim [Sulfamethoxazole W/Trimethoprim] NAUSEA AND VOMITING    Levaquin [Levofloxacin] NAUSEA ONLY and DIZZINESS

## 2025-01-12 RX ORDER — LEVOTHYROXINE SODIUM 88 UG/1
88 TABLET ORAL
Qty: 90 TABLET | Refills: 3 | Status: SHIPPED | OUTPATIENT
Start: 2025-01-12

## 2025-01-12 NOTE — TELEPHONE ENCOUNTER
Refill passed per [facility] protocol.    Requested Prescriptions   Pending Prescriptions Disp Refills    levothyroxine 88 MCG Oral Tab 90 tablet 3     Sig: Take 1 tablet (88 mcg total) by mouth before breakfast.       Thyroid Medication Protocol Passed - 1/12/2025  9:18 AM        Passed - TSH in past 12 months        Passed - Last TSH value is normal     Lab Results   Component Value Date    TSH 0.829 05/16/2024                 Passed - In person appointment or virtual visit in the past 12 mos or appointment in next 3 mos     Recent Outpatient Visits              1 week ago [address]    Office Visit    7 months ago Routine general medical examination at a health care facility    [address]    Office Visit    10 months ago Patellar tendinitis, left knee    [address]    Office Visit    10 months ago Lacerations of multiple sites of leg, left, initial encounter    [address] Veronica Yost MD    Office Visit    1 year ago Family history of colon cancer    [facility],  [facility] Yvonne Sexton,     Office Visit          Future Appointments         Provider Department Appt Notes    In 1 month Clot Luna DPM [facility] Toe fungus                    Passed - Medication is active on med list             Future Appointments         Provider Department Appt Notes    In 1 month Colt Luna DPM [facility] Toe fungus            Recent Outpatient Visits              1 week ago Onychomycosis    [address]    Office Visit    7 months ago Routine general medical examination at  a health care facility    [facility], [address] [place] [address], Bela Rendon APRN    Office Visit    10 months ago Patellar tendinitis, left knee    [facility], [address] [place] [address], Ayad Lancaster PA-C    Office Visit    10 months ago Lacerations of multiple sites of leg, left, initial encounter    [facility], [address] [place] [address] Veronica Yost MD    Office Visit    1 year ago Family history of colon cancer    [facility],  [facility] Yvonne Sexton DO    Office Visit

## 2025-02-21 ENCOUNTER — OFFICE VISIT (OUTPATIENT)
Dept: PODIATRY CLINIC | Facility: CLINIC | Age: 75
End: 2025-02-21
Payer: MEDICARE

## 2025-02-21 DIAGNOSIS — L60.3 NAIL DYSTROPHY: ICD-10-CM

## 2025-02-21 DIAGNOSIS — L60.0 INGROWN NAIL: ICD-10-CM

## 2025-02-21 DIAGNOSIS — B35.1 ONYCHOMYCOSIS: Primary | ICD-10-CM

## 2025-02-21 DIAGNOSIS — M79.674 PAIN IN TOES OF BOTH FEET: ICD-10-CM

## 2025-02-21 DIAGNOSIS — M79.675 PAIN IN TOES OF BOTH FEET: ICD-10-CM

## 2025-02-21 PROCEDURE — 99203 OFFICE O/P NEW LOW 30 MIN: CPT | Performed by: STUDENT IN AN ORGANIZED HEALTH CARE EDUCATION/TRAINING PROGRAM

## 2025-02-21 NOTE — PROGRESS NOTES
[facility] Podiatry  Progress Note    Allyn Delarosa is a 74 year old female.   Chief Complaint   Patient presents with    Toenail Fungus     Bilateral big toes and 2nd toes - onset a while - now it starts to hurt for the past 6 mo - rates pain as 8/10 on and off -          HPI:     Patient is a very pleasant 74-year-old female who presents to clinic for evaluation of possible nail fungus to both big toes and second toes.  She does have pain and impaction associated to these nails.  They have hurt for the last 6 months on and off.  She has tried ciclopirox with limited improvement.  She is wondering what other treatment options are available.  No other complaints are mentioned.  Past medical history, medications, allergies reviewed.      Allergies: Bactrim [sulfamethoxazole w/trimethoprim] and Levaquin [levofloxacin]   Current Outpatient Medications   Medication Sig Dispense Refill    levothyroxine 88 MCG Oral Tab Take 1 tablet (88 mcg total) by mouth before breakfast. 90 tablet 3    famotidine 20 MG Oral Tab       Ciclopirox 8 % External Solution Use once daily at night 6.6 mL 1    Fenofibrate 160 MG Oral Tab Take 1 tablet (160 mg total) by mouth daily. 90 tablet 3    citalopram 40 MG Oral Tab Take 1 tablet (40 mg total) by mouth daily. 90 tablet 3    diclofenac 75 MG Oral Tab EC Take 1 tablet (75 mg total) by mouth 2 (two) times daily. 60 tablet 0    lansoprazole 30 MG Oral Capsule Delayed Release Take 1 capsule (30 mg total) by mouth before breakfast. 30 capsule 3    LORazepam 1 MG Oral Tab Take 1 tablet (1 mg total) by mouth daily as needed. 20 tablet 0    METHENAMINE 1 g Oral Tab TAKE 1 TABLET(1 GRAM) BY MOUTH TWICE DAILY 60 tablet 0      Past Medical History:    Abdominal distention    Abdominal hernia    Hiatal & umbilical    Abdominal pain    Had pancreatitis    Anxiety    Take lorazepam occasionally    Arthritis    Belching    Bloating    Blood in the stool    At times    Chest pain    Chronic  cough    Constipation    Depression    Diarrhea, unspecified    Disorder of thyroid    Easy bruising    Esophageal reflux    Take omepazole    Fatigue    When have digestive issues    Flatulence/gas pain/belching    Headache disorder    Off & on.    Heartburn    Hemorrhoids    Internal    High cholesterol    History of depression    Hyperlipidemia    Hypothyroidism    Indigestion    Irregular bowel habits    Osteoarthritis    generalized    Pain with bowel movements    Painful swallowing    Painful urination    Problems with swallowing    Pulmonary embolism (HCC)    Shortness of breath    Had pulmonary embolisms    Sleep disturbance    At times; heartburn    Thyroid disease    Uncomfortable fullness after meals    Urinary retention    surgery scheduled 02/08/17    Visual impairment    glasses    Wears glasses    Weight gain    Wheezing    At times lying down      Past Surgical History:   Procedure Laterality Date    Breast surgery procedure unlisted  10/2000    breast surgery lumpectomy    Cholecystectomy  2021    Colonoscopy  09/21/2007    diverticulosis, Dr. Stevan Chadwick, Repeat in 7-10 years    Colonoscopy  2000    Colonoscopy  06/12/2018    high fiber diet,hep c antibody,EGD to evaluate,no further colonoscopy indicated    Hysterectomy  1994    total    Lumpectomy right  2001    dcis    Needle biopsy right  2001    Oophorectomy Bilateral 1994    Other      left elbow    Other  02/08/2017    Interstim stage I    Other surgical history  04/24/2018    stage 2 pudendal interstim dr riggs    Removal gallbladder      Total abdom hysterectomy      Age 43      Family History   Problem Relation Age of Onset    Alcohol and Other Disorders Associated Maternal Grandfather     Cancer Sister         breast    Breast Cancer Sister 45        DCIS    DCIS Sister     Diabetes Maternal Grandmother     Heart Disease Mother     Hypertension Mother     Prostate Cancer Brother     Diabetes Brother         Error-remove    Other (HIV)  Brother     Hypertension Brother     Stroke Paternal Grandfather     Breast Cancer Self 52    DCIS Self     Cancer Father         lung    Hypertension Father     Cancer Sister         Breast    Hypertension Sister     Cancer Brother         Throat    Hypertension Brother     Cancer Brother         Prostate    Hypertension Brother     Cancer Brother     Colon Cancer Brother         2021    Prostate Cancer Sister     Hypertension Sister         Error    Hypertension Brother     Uterine Cancer Paternal Uncle         Error-remove      Social History     Socioeconomic History    Marital status:    Tobacco Use    Smoking status: Never    Smokeless tobacco: Never   Vaping Use    Vaping status: Never Used   Substance and Sexual Activity    Alcohol use: Not Currently     Comment: Almost never    Drug use: Not Currently     Types: Cannabis     Comment: Not recently    Sexual activity: Yes   Other Topics Concern    Caffeine Concern Yes    Stress Concern No    Weight Concern Yes    Special Diet No    Exercise Yes    Seat Belt No           REVIEW OF SYSTEMS:     No n/v/f/c.      EXAM:   LMP  (LMP Unknown)   GENERAL: well developed, well nourished, in no apparent distress  EXTREMITIES:   1. Integument: Normal skin temperature and turgor.  Hallux nails and second digit nails are thickened and dystrophic with subungual debris.  Remaining nails are elongated.  2. Vascular: Dorsalis pedis two out of four bilateral and posterior tibial pulses two out of   four bilateral, capillary refill normal.   3. Musculoskeletal: All muscle groups are graded 5 out of 5 in the foot and ankle.  Pain noted to impacted nails.   4. Neurological: Normal sharp dull sensation; reflexes normal.          ASSESSMENT AND PLAN:   Diagnoses and all orders for this visit:    Onychomycosis  -     Dermatophyte Only, Culture [E]; Future    Pain in toes of both feet    Nail dystrophy    Ingrown nail        Plan:    -Patient examined, chart history  reviewed.  -Discussed etiology of condition and various treatment options.  -Nail biopsy obtained to hallux and second digit nails.  Pending results may consider oral versus topical treatment options.  -Today nails were trimmed with nail nipper and smoothed with Dremel.  -Will reach out with nail biopsy results and discuss next steps.  -RTC 2 to 3 months or sooner if other concerns arise.    The patient indicates understanding of these issues and agrees to the plan.        Colt Luna DPM    Dragon speech recognition software was used to prepare this note.  Errors in word recognition may occur.  Please contact me with any questions/concerns with this note.

## 2025-03-13 ENCOUNTER — TELEPHONE (OUTPATIENT)
Dept: INTERNAL MEDICINE CLINIC | Facility: CLINIC | Age: 75
End: 2025-03-13

## 2025-03-13 DIAGNOSIS — Z00.00 ENCOUNTER FOR ANNUAL HEALTH EXAMINATION: ICD-10-CM

## 2025-03-13 DIAGNOSIS — E78.00 PURE HYPERCHOLESTEROLEMIA: Primary | ICD-10-CM

## 2025-03-13 NOTE — TELEPHONE ENCOUNTER
Patient called request labs prior to their annual physical.  Annual physical scheduled for   Future Appointments   Date Time Provider Department Center   6/16/2025 11:00 AM Bela Patel APRN [facility]        Please order labs. Patient preferred lab is [facility]  Patient informed request was sent to clinical team.  Patient informed to fast for labs.  No callback required.

## 2025-05-21 DIAGNOSIS — F41.1 ANXIETY STATE: ICD-10-CM

## 2025-05-22 RX ORDER — LORAZEPAM 1 MG/1
1 TABLET ORAL DAILY PRN
Qty: 20 TABLET | Refills: 0 | Status: SHIPPED | OUTPATIENT
Start: 2025-05-22

## 2025-05-22 NOTE — TELEPHONE ENCOUNTER
Please review; protocol failed/No Protocol      Recent Fills: No dispense history in the last 6 months    Last Rx Written: 12/26/2023    Last Office Visit: 01/02/2025  Future Appointments   Date Time Provider Department Center   6/16/2025 11:00 AM Bela Patel APRN [facility]

## 2025-05-29 ENCOUNTER — LAB ENCOUNTER (OUTPATIENT)
Dept: LAB | Age: 75
End: 2025-05-29
Attending: NURSE PRACTITIONER
Payer: MEDICARE

## 2025-05-29 DIAGNOSIS — Z00.00 ENCOUNTER FOR ANNUAL HEALTH EXAMINATION: ICD-10-CM

## 2025-05-29 DIAGNOSIS — E78.00 PURE HYPERCHOLESTEROLEMIA: ICD-10-CM

## 2025-05-29 LAB
ALBUMIN SERPL-MCNC: 4.7 G/DL (ref 3.2–4.8)
ALBUMIN/GLOB SERPL: 1.9 {RATIO} (ref 1–2)
ALP LIVER SERPL-CCNC: 58 U/L (ref 55–142)
ALT SERPL-CCNC: 19 U/L (ref 10–49)
ANION GAP SERPL CALC-SCNC: 7 MMOL/L (ref 0–18)
AST SERPL-CCNC: 25 U/L (ref ?–34)
BASOPHILS # BLD AUTO: 0.04 X10(3) UL (ref 0–0.2)
BASOPHILS NFR BLD AUTO: 0.9 %
BILIRUB SERPL-MCNC: 0.5 MG/DL (ref 0.2–1.1)
BUN BLD-MCNC: 16 MG/DL (ref 9–23)
CALCIUM BLD-MCNC: 9.7 MG/DL (ref 8.7–10.6)
CHLORIDE SERPL-SCNC: 102 MMOL/L (ref 98–112)
CHOLEST SERPL-MCNC: 227 MG/DL (ref ?–200)
CO2 SERPL-SCNC: 28 MMOL/L (ref 21–32)
CREAT BLD-MCNC: 0.93 MG/DL (ref 0.55–1.02)
EGFRCR SERPLBLD CKD-EPI 2021: 64 ML/MIN/1.73M2 (ref 60–?)
EOSINOPHIL # BLD AUTO: 0.18 X10(3) UL (ref 0–0.7)
EOSINOPHIL NFR BLD AUTO: 3.9 %
ERYTHROCYTE [DISTWIDTH] IN BLOOD BY AUTOMATED COUNT: 13.4 %
FASTING PATIENT LIPID ANSWER: YES
FASTING STATUS PATIENT QL REPORTED: YES
GLOBULIN PLAS-MCNC: 2.5 G/DL (ref 2–3.5)
GLUCOSE BLD-MCNC: 89 MG/DL (ref 70–99)
HCT VFR BLD AUTO: 39.6 % (ref 35–48)
HDLC SERPL-MCNC: 60 MG/DL (ref 40–59)
HGB BLD-MCNC: 12.5 G/DL (ref 12–16)
IMM GRANULOCYTES # BLD AUTO: 0.01 X10(3) UL (ref 0–1)
IMM GRANULOCYTES NFR BLD: 0.2 %
LDLC SERPL CALC-MCNC: 139 MG/DL (ref ?–100)
LYMPHOCYTES # BLD AUTO: 1.72 X10(3) UL (ref 1–4)
LYMPHOCYTES NFR BLD AUTO: 37.4 %
MCH RBC QN AUTO: 30.2 PG (ref 26–34)
MCHC RBC AUTO-ENTMCNC: 31.6 G/DL (ref 31–37)
MCV RBC AUTO: 95.7 FL (ref 80–100)
MONOCYTES # BLD AUTO: 0.47 X10(3) UL (ref 0.1–1)
MONOCYTES NFR BLD AUTO: 10.2 %
NEUTROPHILS # BLD AUTO: 2.18 X10 (3) UL (ref 1.5–7.7)
NEUTROPHILS # BLD AUTO: 2.18 X10(3) UL (ref 1.5–7.7)
NEUTROPHILS NFR BLD AUTO: 47.4 %
NONHDLC SERPL-MCNC: 167 MG/DL (ref ?–130)
OSMOLALITY SERPL CALC.SUM OF ELEC: 285 MOSM/KG (ref 275–295)
PLATELET # BLD AUTO: 325 10(3)UL (ref 150–450)
POTASSIUM SERPL-SCNC: 4.6 MMOL/L (ref 3.5–5.1)
PROT SERPL-MCNC: 7.2 G/DL (ref 5.7–8.2)
RBC # BLD AUTO: 4.14 X10(6)UL (ref 3.8–5.3)
SODIUM SERPL-SCNC: 137 MMOL/L (ref 136–145)
TRIGL SERPL-MCNC: 160 MG/DL (ref 30–149)
TSI SER-ACNC: 1.67 UIU/ML (ref 0.55–4.78)
VLDLC SERPL CALC-MCNC: 30 MG/DL (ref 0–30)
WBC # BLD AUTO: 4.6 X10(3) UL (ref 4–11)

## 2025-05-29 PROCEDURE — 80061 LIPID PANEL: CPT

## 2025-05-29 PROCEDURE — 80053 COMPREHEN METABOLIC PANEL: CPT

## 2025-05-29 PROCEDURE — 36415 COLL VENOUS BLD VENIPUNCTURE: CPT

## 2025-05-29 PROCEDURE — 84443 ASSAY THYROID STIM HORMONE: CPT

## 2025-05-29 PROCEDURE — 85025 COMPLETE CBC W/AUTO DIFF WBC: CPT

## 2025-06-10 PROBLEM — I74.8 THROMBOSIS OF SPLENIC ARTERY (HCC): Status: RESOLVED | Noted: 2022-06-10 | Resolved: 2025-06-10

## 2025-06-16 ENCOUNTER — LAB ENCOUNTER (OUTPATIENT)
Dept: LAB | Age: 75
End: 2025-06-16
Attending: NURSE PRACTITIONER
Payer: MEDICARE

## 2025-06-16 ENCOUNTER — OFFICE VISIT (OUTPATIENT)
Dept: INTERNAL MEDICINE CLINIC | Facility: CLINIC | Age: 75
End: 2025-06-16
Payer: MEDICARE

## 2025-06-16 VITALS
WEIGHT: 202 LBS | TEMPERATURE: 97 F | RESPIRATION RATE: 18 BRPM | BODY MASS INDEX: 35.35 KG/M2 | OXYGEN SATURATION: 95 % | DIASTOLIC BLOOD PRESSURE: 70 MMHG | HEART RATE: 72 BPM | HEIGHT: 63.5 IN | SYSTOLIC BLOOD PRESSURE: 118 MMHG

## 2025-06-16 DIAGNOSIS — F33.0 MILD EPISODE OF RECURRENT MAJOR DEPRESSIVE DISORDER: ICD-10-CM

## 2025-06-16 DIAGNOSIS — D35.02 ADENOMA OF LEFT ADRENAL GLAND: ICD-10-CM

## 2025-06-16 DIAGNOSIS — R41.89 BRAIN FOG: ICD-10-CM

## 2025-06-16 DIAGNOSIS — M85.80 OSTEOPENIA, UNSPECIFIED LOCATION: ICD-10-CM

## 2025-06-16 DIAGNOSIS — R33.9 CHRONIC RETENTION OF URINE: ICD-10-CM

## 2025-06-16 DIAGNOSIS — K21.9 GASTROESOPHAGEAL REFLUX DISEASE, UNSPECIFIED WHETHER ESOPHAGITIS PRESENT: ICD-10-CM

## 2025-06-16 DIAGNOSIS — R53.83 FATIGUE, UNSPECIFIED TYPE: ICD-10-CM

## 2025-06-16 DIAGNOSIS — D64.9 NORMOCYTIC ANEMIA: ICD-10-CM

## 2025-06-16 DIAGNOSIS — G47.10 HYPERSOMNIA: ICD-10-CM

## 2025-06-16 DIAGNOSIS — E55.9 VITAMIN D DEFICIENCY: ICD-10-CM

## 2025-06-16 DIAGNOSIS — Z85.828 HISTORY OF BASAL CELL CARCINOMA (BCC): ICD-10-CM

## 2025-06-16 DIAGNOSIS — R91.8 PULMONARY NODULES: ICD-10-CM

## 2025-06-16 DIAGNOSIS — Z80.0 FAMILY HISTORY OF COLON CANCER: ICD-10-CM

## 2025-06-16 DIAGNOSIS — I72.8 CELIAC ARTERY DILATATION: ICD-10-CM

## 2025-06-16 DIAGNOSIS — Z12.31 ENCOUNTER FOR SCREENING MAMMOGRAM FOR MALIGNANT NEOPLASM OF BREAST: ICD-10-CM

## 2025-06-16 DIAGNOSIS — F41.1 ANXIETY STATE: ICD-10-CM

## 2025-06-16 DIAGNOSIS — E03.9 HYPOTHYROIDISM (ACQUIRED): ICD-10-CM

## 2025-06-16 DIAGNOSIS — K59.01 SLOW TRANSIT CONSTIPATION: ICD-10-CM

## 2025-06-16 DIAGNOSIS — E78.00 PURE HYPERCHOLESTEROLEMIA: ICD-10-CM

## 2025-06-16 DIAGNOSIS — N31.9 NEUROGENIC BLADDER: ICD-10-CM

## 2025-06-16 DIAGNOSIS — Z00.00 ROUTINE GENERAL MEDICAL EXAMINATION AT A HEALTH CARE FACILITY: Primary | ICD-10-CM

## 2025-06-16 DIAGNOSIS — Z87.19 HISTORY OF ACUTE PANCREATITIS: ICD-10-CM

## 2025-06-16 DIAGNOSIS — Z86.711 HISTORY OF PULMONARY EMBOLISM: ICD-10-CM

## 2025-06-16 LAB
VIT B12 SERPL-MCNC: 365 PG/ML (ref 211–911)
VIT D+METAB SERPL-MCNC: 40.5 NG/ML (ref 30–100)

## 2025-06-16 PROCEDURE — 82306 VITAMIN D 25 HYDROXY: CPT

## 2025-06-16 PROCEDURE — 82607 VITAMIN B-12: CPT

## 2025-06-16 PROCEDURE — 36415 COLL VENOUS BLD VENIPUNCTURE: CPT

## 2025-06-16 RX ORDER — LANSOPRAZOLE 30 MG/1
30 CAPSULE, DELAYED RELEASE ORAL DAILY
Qty: 90 CAPSULE | Refills: 0 | Status: SHIPPED | OUTPATIENT
Start: 2025-06-16 | End: 2026-06-11

## 2025-06-16 NOTE — PROGRESS NOTES
Subjective:   Allyn Delarosa is a 74 year old female who presents for a Subsequent Annual Wellness visit (Pt already had Initial Annual Wellness) and scheduled follow up of multiple significant but stable problems.   History of Present Illness  Allyn Delarosa is a 74 year old for AWV and f/u of other conditions. .    Excessvie fatigue.  Wanting to nap more and feeling \"run down\".  Brain fog.  Muscles feel weak.  No unintentional weight loss.  Denies snoring.  Sleep study done 2017.  Not feeling depressed.  Taking 100-2000IU vit d  nto taking b12.      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 Imaging via US was last performed a year ago.  Given above, will CT this year.      Acid reflux symptoms are worsening. They are not currently on medication but previously found lansoprazole effective and wish to resume it.  Has f/u with GI in 4 weeks as well.      Cholesterol levels have increased, with an LDL of 139 mg/dL and total cholesterol of 227 mg/dL. HDL is 60 mg/dL, and triglycerides are 160 mg/dL. Previous LDL levels were 113 mg/dL and 125 mg/dL.  Describes poor dietary habits.      Current medications include levothyroxine 88 mcg, fenofibrate, citalopram, and allopurinol. They take a vitamin D supplement, approximately 2000 IU daily, and use lorazepam sparingly.    History/Other:   Fall Risk Assessment:   Allyn has been screened for Falls and is High Risk. Fall Prevention information provided to patient in After Visit Summary.    Do you feel unsteady when standing or walking?: (Patient-Rptd) Yes  Do you worry about falling?: (Patient-Rptd) Yes  Have you fallen in the past year?: (Patient-Rptd) Yes  How many times have you fallen?: (Patient-Rptd) (P) 1  Were you injured?: (Patient-Rptd) (P) No     Cognitive Assessment:   Allyn had a completely normal cognitive assessment - see flowsheet entries       Functional Ability/Status:   Allyn Delarosa has a completely normal  functional assessment. See flowsheet for details.      Depression Screening (PHQ):  PHQ-9 TOTAL SCORE: 9  , done 6/16/2025   Feeling down, depressed, or hopeless: 3    Trouble falling or staying asleep, or sleeping too much: 3     Feeling tired or having little energy: 3    If you checked off any problems, how difficult have these problems made it for you to do your work, take care of things at home, or get along with other people?: Not difficult at all    Last [place] Suicide Screening on 6/16/2025 was No Risk.          Advanced Directives:   Allyn does have a Living Will but we do NOT have it on file in Epic.    Allyn does have a POA but we do NOT have it on file in Epic.    Discussed Advance Care Planning with patient (and family/surrogate if present). Standard forms made available to patient in After Visit Summary.      Problem List[1]  Allergies:  Allyn is allergic to bactrim [sulfamethoxazole w/trimethoprim] and levaquin [levofloxacin].    Current Medications:  Active Meds, Sig Only[2]    Medical History:  Allyn  has a past medical history of Abdominal distention (2022), Abdominal hernia, Abdominal pain (2021), Anxiety, Arthritis, Belching (2022), Bloating (2020), Blood in the stool, Chest pain, Chronic cough, Constipation, Depression, Diarrhea, unspecified (Occasionally), Disorder of thyroid, Easy bruising, Esophageal reflux, Fatigue, Flatulence/gas pain/belching (2020), Headache disorder (Lifelong), Heartburn (2020), Hemorrhoids, High cholesterol, History of depression, Hyperlipidemia, Hypothyroidism, Indigestion (2020), Irregular bowel habits (2022), Osteoarthritis, Pain with bowel movements (At times with constipation), Painful swallowing (11/2022), Painful urination (2017), Problems with swallowing, Pulmonary embolism (HCC) (05/2021), Shortness of breath (05/09/2021), Sleep disturbance (2022), Thyroid disease, Uncomfortable fullness after meals (2022), Urinary retention, Visual impairment, Wears  glasses, Weight gain, and Wheezing.  Surgical History:  Allyn  has a past surgical history that includes colonoscopy (09/21/2007); breast surgery procedure unlisted (10/2000); hysterectomy (1994); needle biopsy right (2001); colonoscopy (2000); other; other (02/08/2017); other surgical history (04/24/2018); colonoscopy (06/12/2018); lumpectomy right (2001); removal gallbladder; oophorectomy (Bilateral, 1994); cholecystectomy (2021); and total abdom hysterectomy.   Family History:  Allyn's family history includes Alcohol and Other Disorders Associated in Allyn's maternal grandfather; Breast Cancer (age of onset: 45) in Allyn's sister; Breast Cancer (age of onset: 52) in Allyn's self; Cancer in Allyn's brother, brother, brother, father, sister, and sister; Colon Cancer in Allyn's brother; DCIS in Allyn's self and sister; Diabetes in Allyn's brother and maternal grandmother; HIV in Allyn's brother; Heart Disease in Allyn's mother; Hypertension in Allyn's brother, brother, brother, brother, father, mother, sister, and sister; Prostate Cancer in Allyn's brother and sister; Stroke in Allyn's paternal grandfather; Uterine Cancer in Allyn's paternal uncle.  Social History:  Allyn  reports that Allyn has never smoked. Allyn has never been exposed to tobacco smoke. Allyn has never used smokeless tobacco. Allyn reports that Allyn does not currently use alcohol. Allyn reports that Allyn does not use drugs.    Tobacco:  Allyn has never smoked tobacco.    CAGE Alcohol Screen:   CAGE screening score of 0 on 6/15/2025, showing low risk of alcohol abuse.      Patient Care Team:  Veronica Yost MD as PCP - General (Internal Medicine)  Beal Patel APRN (Internal Medicine)  Bradford Mccray PT as Physical Therapist (Physical Therapy)  Alessia Tamez, Joe Barnes PT as Physical Therapist  Magda Fleming APRN (Nurse Practitioner Family)    Review of Systems     Negative except as above     Objective:    Physical Exam    Physical Exam  General Appearance:  Alert, cooperative, no distress, appears stated age   Head:  Normocephalic, without obvious abnormality, atraumatic   Eyes:  PERRL, conjunctiva/corneas clear, EOM's intact both eyes   Ears:  Normal TM's and external ear canals, both ears   Nose: Nares normal, septum midline,mucosa normal, no drainage or sinus tenderness   Throat: Lips, mucosa, and tongue normal; teeth and gums normal   Neck: Supple, symmetrical, trachea midline, no adenopathy;  thyroid: not enlarged, symmetric, no JVD   Back:   Symmetric, no curvature, ROM normal, no CVA tenderness   Lungs:   Clear to auscultation bilaterally, respirations unlabored   Heart:  Regular rate and rhythm, S1 and S2 normal,    Abdomen:   Soft, non-tender, bowel sounds active all four quadrants,  no masses,    Extremities: Extremities normal, atraumatic, no edema   Pulses: symmetric   Skin: Skin color, texture, turgor normal,    Lymph nodes: Cervical, supraclavicular nodes normal   Neurologic: Normal          /70 (BP Location: Right arm, Patient Position: Sitting, Cuff Size: large)   Pulse 72   Temp 97.4 °F (36.3 °C) (Temporal)   Resp 18   Ht 5' 3.5\" (1.613 m)   Wt 202 lb (91.6 kg)   LMP  (LMP Unknown)   SpO2 95%   Breastfeeding No   BMI 35.22 kg/m²  Estimated body mass index is 35.22 kg/m² as calculated from the following:    Height as of this encounter: 5' 3.5\" (1.613 m).    Weight as of this encounter: 202 lb (91.6 kg).    Medicare Hearing Assessment:   Hearing Screening    Time taken: 6/16/2025 11:13 AM  Entry User: Tori Marrero CMA  Screening Method: Finger Rub  Finger Rub Result: Pass         Visual Acuity:   Right Eye Visual Acuity: Corrected Right Eye Chart Acuity: 20/50   Left Eye Visual Acuity: Corrected Left Eye Chart Acuity: 20/30   Both Eyes Visual Acuity: Corrected Both Eyes Chart Acuity: 20/50   Able To Tolerate Visual Acuity: Yes        Assessment & Plan:   Allynsusan Delarosa is a 74  year old female who presents for a Medicare Assessment.     1. Routine general medical examination at a health care facility (Primary)  2. Celiac artery dilatation  CTA ordered.   3. Pure hypercholesterolemia  Diet, exercise, and lifestyle modification.  Repeat labs 3 mo  4. Pulmonary nodules  stable 2023  may consider repeat if w/u for fatigue is unremarkable.    5. Hypothyroidism (acquired)  stable   levothryoxine.   6. Osteopenia, unspecified location  stabl e  Continue weight bearing exercises and vit d supplement.    7. Anxiety state  stabl e  citalopram.   8. Mild episode of recurrent major depressive disorder  stable  citalopram.   9. Gastroesophageal reflux disease, unspecified whether esophagitis present  restart PPI  has f/u with gi  10. Slow transit constipation  stable  monitor.   11. History of acute pancreatitis  stable  monitor.   12. Chronic retention of urine  stable  monitor. Self cath per Dr Whitehead.    13. Neurogenic bladder  stable  per urology  Dr Whitehead.  Will check with him prior to sending UA/    14. History of pulmonary embolism  stable  monitor  15. Adenoma of left adrenal gland  will image w CT as above   16. Family history of colon cancer stable  monitor.   17. History of basal cell carcinoma (BCC)  stabl e derm   18. Normocytic anemia  stable  monitor  hgb 12  19. Fatigue, unspecified type  w.u in progress.   20. Brain fog w/u in progress   21  hypersomnia.  Sleep study.   22  vit d def  check vit d    Assessment & Plan  Gastroesophageal reflux disease (GERD)  Worsening symptoms, previously managed with lansoprazole.  - Restart lansoprazole until gastroenterology appointment on July 17th.    Fatigue  Persistent fatigue with normal sleep study 2017 and stable thyroid function.   - Order home sleep study for sleep apnea evaluation.  - Check vitamin B12 and vitamin D levels.    Celiac artery dilatation  Previously stable dilatation. Monitoring with CT scan to assess condition and adrenal  gland adenoma.  - Order CT abdomen and pelvis with IV contrast.    Hypercholesterolemia  Elevated LDL and total cholesterol. Previous LDL levels were lower.  - Repeat lipid panel in 3 months.    Follow-up  Review results to address fatigue and monitor conditions.  - Follow up after completion of CT scan, sleep study, and blood work.  The patient indicates understanding of these issues and agrees to the plan.  Reinforced healthy diet, lifestyle, and exercise.      No follow-ups on file.     PATTI Bermudez, 6/16/2025     Supplementary Documentation:   General Health:  In the past six months, have you lost more than 10 pounds without trying?: (Patient-Rptd) 2 - No  Has your appetite been poor?: (Patient-Rptd) No  Type of Diet: (Patient-Rptd) Other  How does the patient maintain a good energy level?: (Patient-Rptd) Other  How would you describe your daily physical activity?: (Patient-Rptd) Light  How would you describe your current health state?: (Patient-Rptd) Fair  How do you maintain positive mental well-being?: (Patient-Rptd) Social Interaction, Puzzles, Games, Visiting Friends, Visiting Family  On a scale of 0 to 10, with 0 being no pain and 10 being severe pain, what is your pain level?: (Patient-Rptd) 2 - (Mild)  In the past six months, have you experienced urine leakage?: (Patient-Rptd) 0-No  At any time do you feel concerned for the safety/well-being of yourself and/or your children, in your home or elsewhere?: No  Have you had any immunizations at another office such as Influenza, Hepatitis B, Tetanus, or Pneumococcal?: (Patient-Rptd) Yes    Health Maintenance   Topic Date Due    Annual Depression Screening  01/01/2025    COVID-19 Vaccine (8 - 2024-25 season) 04/09/2025    Annual Physical  06/05/2025    Influenza Vaccine  Completed    DEXA Scan  Completed    Fall Risk Screening (Annual)  Completed    Pneumococcal Vaccine: 50+ Years  Completed    Zoster Vaccines  Completed    Meningococcal B Vaccine  Aged  Out    Colorectal Cancer Screening  Discontinued    Mammogram  Discontinued          The following individual(s) verbally consented to be recorded using ambient AI listening technology and understand that they can each withdraw their consent to this listening technology at any point by asking the clinician to turn off or pause the recording:    Patient name: Allyn Delarosa  Additional names:           [1]   Patient Active Problem List  Diagnosis    Pain in joint, lower leg    Pain in joint, pelvic region and thigh    Pure hypercholesterolemia    Depression    Hypothyroidism (acquired)    Anxiety state    Enthesopathy of hip region    Osteopenia    Neurogenic bladder    Chronic retention of urine    Slow transit constipation    History of pulmonary embolism    Pulmonary nodules    Primary osteoarthritis of left knee    Normocytic anemia    Hemarthrosis of left knee    History of acute pancreatitis    S/P laparoscopic cholecystectomy    Adenoma of left adrenal gland    Celiac artery dilatation    History of basal cell carcinoma (BCC)    Dysphagia    Gastroesophageal reflux disease    Abdominal bloating    Family history of colon cancer   [2]   No outpatient medications have been marked as taking for the 6/16/25 encounter (Office Visit) with Bela Patel APRN.

## 2025-06-19 ENCOUNTER — TELEPHONE (OUTPATIENT)
Dept: INTERNAL MEDICINE CLINIC | Facility: CLINIC | Age: 75
End: 2025-06-19

## 2025-06-19 RX ORDER — OMEPRAZOLE 20 MG/1
20 CAPSULE, DELAYED RELEASE ORAL
Qty: 90 CAPSULE | Refills: 3 | Status: SHIPPED | OUTPATIENT
Start: 2025-06-19

## 2025-06-26 ENCOUNTER — TELEPHONE (OUTPATIENT)
Dept: INTERNAL MEDICINE CLINIC | Facility: CLINIC | Age: 75
End: 2025-06-26

## 2025-06-26 NOTE — TELEPHONE ENCOUNTER
Informed pt this medication will have to purchased OTC as the insurance does not cover it.    omeprazole 20 MG Oral Capsule Delayed Release 90 capsule 3 6/19/2025     Pt understood and had no further questions.

## 2025-06-30 ENCOUNTER — HOSPITAL ENCOUNTER (OUTPATIENT)
Dept: CT IMAGING | Age: 75
Discharge: HOME | End: 2025-06-30
Attending: NURSE PRACTITIONER
Payer: MEDICARE

## 2025-06-30 DIAGNOSIS — I72.8 CELIAC ARTERY DILATATION: ICD-10-CM

## 2025-06-30 DIAGNOSIS — D35.02 ADENOMA OF LEFT ADRENAL GLAND: ICD-10-CM

## 2025-06-30 PROCEDURE — 74174 CTA ABD&PLVS W/CONTRAST: CPT | Performed by: NURSE PRACTITIONER

## 2025-07-06 ENCOUNTER — OFFICE VISIT (OUTPATIENT)
Dept: SLEEP CENTER | Age: 75
End: 2025-07-06
Attending: Other
Payer: MEDICARE

## 2025-07-06 DIAGNOSIS — G47.10 HYPERSOMNIA: ICD-10-CM

## 2025-07-06 DIAGNOSIS — R53.83 FATIGUE, UNSPECIFIED TYPE: ICD-10-CM

## 2025-07-06 DIAGNOSIS — R41.89 BRAIN FOG: ICD-10-CM

## 2025-07-06 PROCEDURE — 95806 SLEEP STUDY UNATT&RESP EFFT: CPT

## 2025-07-17 RX ORDER — CITALOPRAM HYDROBROMIDE 40 MG/1
40 TABLET ORAL DAILY
Qty: 90 TABLET | Refills: 3 | Status: SHIPPED | OUTPATIENT
Start: 2025-07-17

## 2025-07-17 NOTE — TELEPHONE ENCOUNTER
REFILL PASSED PER [facility] PROTOCOLS       Please review pended refill request as unable to refill due to high/very high drug interaction warning copied here;       High  High Dose: citalopram, 40 mg, Oral, DailySingle dose of 40 mg exceeds recommended maximum of 20 mg by 100%  Daily dose of 40 mg exceeds recommended maximum of 20 mg by 100%  Details

## 2025-07-23 ENCOUNTER — APPOINTMENT (OUTPATIENT)
Dept: GENERAL RADIOLOGY | Age: 75
End: 2025-07-23
Attending: NURSE PRACTITIONER

## 2025-07-23 ENCOUNTER — SLEEP STUDY (OUTPATIENT)
Facility: CLINIC | Age: 75
End: 2025-07-23

## 2025-07-23 ENCOUNTER — HOSPITAL ENCOUNTER (OUTPATIENT)
Age: 75
Discharge: HOME | End: 2025-07-23

## 2025-07-23 VITALS
HEART RATE: 64 BPM | OXYGEN SATURATION: 100 % | DIASTOLIC BLOOD PRESSURE: 74 MMHG | SYSTOLIC BLOOD PRESSURE: 148 MMHG | RESPIRATION RATE: 18 BRPM | TEMPERATURE: 98 F

## 2025-07-23 DIAGNOSIS — G47.9 SLEEP DISORDER: Primary | ICD-10-CM

## 2025-07-23 DIAGNOSIS — U07.1 COVID: Primary | ICD-10-CM

## 2025-07-23 LAB
S PYO AG THROAT QL: NEGATIVE
SARS-COV-2 RNA RESP QL NAA+PROBE: DETECTED

## 2025-07-23 PROCEDURE — 71046 X-RAY EXAM CHEST 2 VIEWS: CPT | Performed by: NURSE PRACTITIONER

## 2025-07-23 PROCEDURE — 95806 SLEEP STUDY UNATT&RESP EFFT: CPT | Performed by: OTHER

## 2025-07-23 PROCEDURE — 99213 OFFICE O/P EST LOW 20 MIN: CPT | Performed by: NURSE PRACTITIONER

## 2025-07-23 PROCEDURE — U0002 COVID-19 LAB TEST NON-CDC: HCPCS | Performed by: NURSE PRACTITIONER

## 2025-07-23 PROCEDURE — 87880 STREP A ASSAY W/OPTIC: CPT | Performed by: NURSE PRACTITIONER

## 2025-07-23 RX ORDER — BENZONATATE 100 MG/1
100 CAPSULE ORAL 3 TIMES DAILY PRN
Qty: 30 CAPSULE | Refills: 0 | Status: SHIPPED | OUTPATIENT
Start: 2025-07-23 | End: 2025-08-22

## 2025-07-23 NOTE — ED PROVIDER NOTES
Patient Seen in: [facility]        History  Chief Complaint   Patient presents with    Covid-19 Test     Have had sore throat, cough,chest & sinus congestion for over a week. Eyes watering. Not improving. Feel miserable. - Entered by patient    Cough    Sore Throat     Stated Complaint: Covid-19 Test - Have had sore throat, cough,chest & sinus congestion for over a*    Subjective:   This is a 74-year-old female with below stated medical history.  Presents to immediate care for one week of URI symptoms.  Reports nasal congestion, watery eyes, chest congestion, and cough.  Recent travel to [place].  No chest pain or shortness of breath.  No fevers.  No swelling in the legs.  Taking generic DayQuil with little relief.    The history is provided by the patient.                     Objective:     Past Medical History:    Abdominal distention    Abdominal hernia    Hiatal & umbilical    Abdominal pain    Had pancreatitis    Anxiety    Take lorazepam occasionally    Arthritis    Belching    Bloating    Blood in the stool    At times    Chest pain    Chronic cough    Constipation    Depression    Diarrhea, unspecified    Disorder of thyroid    Easy bruising    Esophageal reflux    Take omepazole    Falls    Fatigue    When have digestive issues    Flatulence/gas pain/belching    Frequent UTI    Neurogenic bladder    Headache disorder    Off & on.    Heartburn    Hemorrhoids    Internal    High cholesterol    History of blood clots    History of depression    Hyperlipidemia    Hypothyroidism    Indigestion    Irregular bowel habits    Obesity    Osteoarthritis    generalized    Pain    05/09/2021    Pain with bowel movements    Painful swallowing    Painful urination    Problems with swallowing    Pulmonary embolism (HCC)    Shortness of breath    Had pulmonary embolisms    Sleep disturbance    At times; heartburn    Thyroid disease    Uncomfortable fullness after meals    Urinary retention    surgery  scheduled 02/08/17    Visual impairment    glasses    Walking troubles    Wears glasses    Weight gain    Wheezing    At times lying down              Past Surgical History:   Procedure Laterality Date    Breast biopsy  2001    Non cancerous    Breast surgery procedure unlisted  10/2000    breast surgery lumpectomy    Cholecystectomy  2021    Colonoscopy  09/21/2007    diverticulosis, Dr. Stevan Chadwick, Repeat in 7-10 years    Colonoscopy  2000    Colonoscopy  06/12/2018    high fiber diet,hep c antibody,EGD to evaluate,no further colonoscopy indicated    Hysterectomy  1994    total    Lumpectomy right  2001    dcis    Needle biopsy right  2001    Oophorectomy Bilateral 1994    Other      left elbow    Other  02/08/2017    Interstim stage I    Other surgical history  04/24/2018    stage 2 pudendal interstim dr riggs    Removal gallbladder      Total abdom hysterectomy      Age 43                Social History     Socioeconomic History    Marital status:    Tobacco Use    Smoking status: Never     Passive exposure: Never    Smokeless tobacco: Never   Vaping Use    Vaping status: Never Used   Substance and Sexual Activity    Alcohol use: Not Currently     Comment: Almost never    Drug use: Not Currently     Types: Cannabis     Comment: Not recently    Sexual activity: Yes   Other Topics Concern    Caffeine Concern Yes    Stress Concern No    Weight Concern Yes    Special Diet No    Exercise Yes    Seat Belt Yes     Social Drivers of Health     Food Insecurity: No Food Insecurity (6/16/2025)    NCSS - Food Insecurity     Worried About Running Out of Food in the Last Year: No     Ran Out of Food in the Last Year: No   Transportation Needs: No Transportation Needs (6/16/2025)    NCSS - Transportation     Lack of Transportation: No   Housing Stability: Not At Risk (6/16/2025)    NCSS - Housing/Utilities     Has Housing: Yes     Worried About Losing Housing: No     Unable to Get Utilities: No              Review of  Systems   Constitutional:  Negative for chills and fever.   HENT:  Positive for congestion and sore throat. Negative for ear pain.    Respiratory:  Positive for cough. Negative for shortness of breath, wheezing and stridor.    Cardiovascular:  Negative for chest pain, palpitations and leg swelling.   Gastrointestinal:  Negative for abdominal pain.   Genitourinary:  Negative for dysuria.   Musculoskeletal:  Negative for back pain, neck pain and neck stiffness.   Skin:  Negative for rash.   Neurological:  Negative for headaches.       Positive for stated complaint: Covid-19 Test - Have had sore throat, cough,chest & sinus congestion for over a*  Other systems are as noted in HPI.  Constitutional and vital signs reviewed.      All other systems reviewed and negative except as noted above.                  Physical Exam    ED Triage Vitals [07/23/25 1118]   /74   Pulse 64   Resp 18   Temp 98.4 °F (36.9 °C)   Temp src Oral   SpO2 100 %   O2 Device None (Room air)       Current Vitals:   Vital Signs  BP: 148/74  Pulse: 64  Resp: 18  Temp: 98.4 °F (36.9 °C)  Temp src: Oral    Oxygen Therapy  SpO2: 100 %  O2 Device: None (Room air)            Physical Exam  Vitals and nursing note reviewed.   Constitutional:       General: Allyn is not in acute distress.     Appearance: Normal appearance. Allyn is not ill-appearing, toxic-appearing or diaphoretic.   HENT:      Head: Normocephalic and atraumatic.      Right Ear: Tympanic membrane, ear canal and external ear normal. No drainage, swelling or tenderness. No middle ear effusion. Tympanic membrane is not erythematous.      Left Ear: Tympanic membrane, ear canal and external ear normal. No drainage, swelling or tenderness.  No middle ear effusion. Tympanic membrane is not erythematous.      Nose: Congestion and rhinorrhea present.      Mouth/Throat:      Mouth: Mucous membranes are moist. No oral lesions.      Pharynx: Oropharynx is clear. Uvula midline. Posterior  oropharyngeal erythema present. No pharyngeal swelling, oropharyngeal exudate or uvula swelling.      Tonsils: No tonsillar exudate or tonsillar abscesses.   Eyes:      General:         Right eye: No discharge.         Left eye: No discharge.      Extraocular Movements: Extraocular movements intact.      Conjunctiva/sclera: Conjunctivae normal.   Cardiovascular:      Rate and Rhythm: Normal rate.      Heart sounds: Normal heart sounds. No murmur heard.  Pulmonary:      Effort: Pulmonary effort is normal. No respiratory distress.      Breath sounds: Normal breath sounds. No stridor. No wheezing, rhonchi or rales.   Musculoskeletal:      Cervical back: Neck supple.      Right lower leg: No edema.      Left lower leg: No edema.   Skin:     General: Skin is warm and dry.      Capillary Refill: Capillary refill takes less than 2 seconds.      Findings: No rash.   Neurological:      Mental Status: Allyn is alert and oriented to person, place, and time.   Psychiatric:         Mood and Affect: Mood normal.         Behavior: Behavior normal.                 ED Course  Labs Reviewed   RAPID SARS-COV-2 BY PCR - Abnormal; Notable for the following components:       Result Value    Rapid SARS-CoV-2 by PCR Detected (*)     All other components within normal limits   POCT RAPID STREP - Normal          Rapid COVID, rapid strep, chest x-ray                  MDM     Vital signs stable. Patient is well-appearing and nontoxic looking. Presents to immediate care for upper respiratory symptoms.    Differential diagnosis include but not limited to COVID, viral sinusitis, strep, influenza, other viral URI, pneumonia     Lung sounds clear bilaterally. No respiratory distress or hypoxia.  Chest x-ray films interpreted and reviewed myself.  Results show no acute findings.       Rapid strep is negative.  Rapid COVID is positive.  Positive COVID.  Out of the window for Paxlovid.     DC home.  Rx given for Tessalon Perles.  Recommended  over-the-counter antihistamines and Flonase. Tylenol and/or ibuprofen for pain or fever.  Infection precautions reviewed.  The importance of oral hydration and close follow-up with primary provider in 1 week discussed. Reasons to seek emergent treatment reinforced. Patient verbalized understanding, and agreed with plan of care. All questions answered.          Medical Decision Making      Disposition and Plan     Clinical Impression:  1. COVID         Disposition:  Discharge  7/23/2025 12:26 pm    Follow-up:  Veronica Yost MD  [address] [place] [id number]  [phone]    In 1 week            Medications Prescribed:  Current Discharge Medication List        START taking these medications    Details   benzonatate 100 MG Oral Cap Take 1 capsule (100 mg total) by mouth 3 (three) times daily as needed for cough.  Qty: 30 capsule, Refills: 0                   Supplementary Documentation:

## 2025-07-23 NOTE — DISCHARGE INSTRUCTIONS
Please take over-the-counter antihistamines like Claritin or Zyrtec with Flonase nasal way.  Tessalon Perles for coughing.  Infection precautions reviewed.  PCP follow-up as needed.

## 2025-08-11 RX ORDER — FENOFIBRATE 160 MG/1
160 TABLET ORAL DAILY
Qty: 90 TABLET | Refills: 3 | Status: SHIPPED | OUTPATIENT
Start: 2025-08-11

## 2025-08-20 ENCOUNTER — HOSPITAL ENCOUNTER (OUTPATIENT)
Dept: MAMMOGRAPHY | Age: 75
Discharge: HOME | End: 2025-08-20
Attending: NURSE PRACTITIONER

## 2025-08-20 DIAGNOSIS — Z12.31 ENCOUNTER FOR SCREENING MAMMOGRAM FOR MALIGNANT NEOPLASM OF BREAST: ICD-10-CM

## 2025-08-20 PROCEDURE — 77067 SCR MAMMO BI INCL CAD: CPT | Performed by: NURSE PRACTITIONER

## 2025-08-20 PROCEDURE — 77063 BREAST TOMOSYNTHESIS BI: CPT | Performed by: NURSE PRACTITIONER

## (undated) DIAGNOSIS — Z00.00 ROUTINE GENERAL MEDICAL EXAMINATION AT A HEALTH CARE FACILITY: Primary | ICD-10-CM

## (undated) DIAGNOSIS — E03.9 HYPOTHYROIDISM (ACQUIRED): ICD-10-CM

## (undated) DIAGNOSIS — R79.89 ELEVATED LFTS: ICD-10-CM

## (undated) DIAGNOSIS — E78.00 PURE HYPERCHOLESTEROLEMIA: ICD-10-CM

## (undated) DEVICE — SOL H2O 1000ML BTL

## (undated) DEVICE — STERILE (15.2 X 244CM) POLYETHYLENE TELESCOPICALLY-FOLDED COVER WITH ATTACHED (3CM) NEOGUARD™ TIP: Brand: SURGI-TIP TRANSDUCER COVER

## (undated) DEVICE — LAP CHOLE/APPY CDS-LF: Brand: MEDLINE INDUSTRIES, INC.

## (undated) DEVICE — KIT NRSTM INTSTM LD INTRO

## (undated) DEVICE — DRAPE C-ARM UNIVERSAL

## (undated) DEVICE — 3M™ TEGADERM™ TRANSPARENT FILM DRESSING, 1626W, 4 IN X 4-3/4 IN (10 CM X 12 CM), 50 EACH/CARTON, 4 CARTON/CASE: Brand: 3M™ TEGADERM™

## (undated) DEVICE — SUTURE MONOCRYL 4-0 PS-2

## (undated) DEVICE — MINI LAP PACK-LF: Brand: MEDLINE INDUSTRIES, INC.

## (undated) DEVICE — GLOVE SURG SENSICARE SZ 7

## (undated) DEVICE — ANTNA NEURO STIM NLTX  EXT

## (undated) DEVICE — INSUFFLATION NEEDLE TO ESTABLISH PNEUMOPERITONEUM.: Brand: INSUFFLATION NEEDLE

## (undated) DEVICE — SUTURE VICRYL 6-0 S-28

## (undated) DEVICE — 3M™ STERI-STRIP™ REINFORCED ADHESIVE SKIN CLOSURES, R1547, 1/2 IN X 4 IN (12 MM X 100 MM), 6 STRIPS/ENVELOPE: Brand: 3M™ STERI-STRIP™

## (undated) DEVICE — PROG NEURO STIM VERIFY ENS

## (undated) DEVICE — DISPOSABLE LAPAROSCOPIC CLIP APPLIER WITH 20 CLIPS.: Brand: EPIX® UNIVERSAL CLIP APPLIER

## (undated) DEVICE — SUPER SPONGES,MEDIUM: Brand: KERLIX

## (undated) DEVICE — PROGRAMMER NRSTM 3.7X2.2X1.1IN

## (undated) DEVICE — GAUZE SPONGES,USP TYPE VII GAUZE, 12 PLY: Brand: CURITY

## (undated) DEVICE — SUTURE PROLENE 3-0 SH

## (undated) DEVICE — TIGERTAIL 5F FLXTIP 70CM

## (undated) DEVICE — 3M™ IOBAN™ 2 ANTIMICROBIAL INCISE DRAPE 6650EZ: Brand: IOBAN™ 2

## (undated) DEVICE — TROCAR: Brand: KII SHIELDED BLADED ACCESS SYSTEM

## (undated) DEVICE — SHEET, T, LAPAROTOMY, STERILE: Brand: MEDLINE

## (undated) DEVICE — REM POLYHESIVE ADULT PATIENT RETURN ELECTRODE: Brand: VALLEYLAB

## (undated) DEVICE — C-ARM: Brand: UNBRANDED

## (undated) DEVICE — CHLORAPREP 26ML APPLICATOR

## (undated) DEVICE — NEEDLE SPNL 5IN 20GA INTSTM

## (undated) DEVICE — SUTURE VICRYL 2-0 CT-2

## (undated) DEVICE — SUTURE VICRYL 2-0 UR-6

## (undated) DEVICE — SUTURE MONOCRYL 4-0 P-3

## (undated) DEVICE — TROCAR: Brand: KII® SLEEVE

## (undated) DEVICE — 1010 S-DRAPE TOWEL DRAPE 10/BX: Brand: STERI-DRAPE™

## (undated) DEVICE — Device

## (undated) DEVICE — CABLE NEURO STIM TWST LCK 25CM

## (undated) DEVICE — STERILE POLYISOPRENE POWDER-FREE SURGICAL GLOVES: Brand: PROTEXIS

## (undated) DEVICE — SCD SLEEVE KNEE HI BLEND

## (undated) DEVICE — KENDALL SCD EXPRESS SLEEVES, KNEE LENGTH, MEDIUM: Brand: KENDALL SCD

## (undated) DEVICE — 40580 - THE PINK PAD - ADVANCED TRENDELENBURG POSITIONING KIT: Brand: 40580 - THE PINK PAD - ADVANCED TRENDELENBURG POSITIONING KIT

## (undated) DEVICE — UNDYED BRAIDED (POLYGLACTIN 910), SYNTHETIC ABSORBABLE SUTURE: Brand: COATED VICRYL

## (undated) DEVICE — LIGHT HANDLE

## (undated) DEVICE — C-ARMOR C-ARM EQUIPMENT COVERS CLEAR STERILE UNIVERSAL FIT 12 PER CASE: Brand: C-ARMOR

## (undated) DEVICE — MONOFILAMENT ABSORBABLE SUTURE: Brand: MAXON

## (undated) DEVICE — ZIPWIRE GUIDE .038X150 STR/STF

## (undated) DEVICE — SOL  .9 1000ML BTL

## (undated) NOTE — LETTER
Patrica Bailey [address] [facility] [address] [facility]  Authorization for Surgical Operation and Procedure   Date:___________                                                                                            Time:__________  1.  I hereby aut that can occur: fever and allergic reactions, hemolytic reactions, transmission of diseases such as Hepatitis, AIDS and Cytomegalovirus (CMV) and fluid overload.   In the event that I wish to have an autologous transfusion of my own blood, or a directed don when the applicable recovery period ends for purposes of reinstating the DNAR order.   10. Patients having a sterilization procedure: I understand that if the procedure is successful the results will be permanent and it will therefore be impossible for me t

## (undated) NOTE — Clinical Note
I had the pleasure of seeing Melodye Bars on 9/27/2021. Please see my attached note.     Ting aDy MD FACS  [facility]gery

## (undated) NOTE — MR AVS SNAPSHOT
EM[facility]5 [address] [address] [phone]               Thank you for choosing us for your health care visit with Pasha Chavez MD.  We are glad to serve you and happy to provide you with this summ office. At that time, you will be provided with any authorization numbers or be assured that none are required. You can then schedule your appointment. Failure to obtain required authorization numbers can create reimbursement difficulties for you.     Indic If you've recently had a stay at the Hospital you can access your discharge instructions in [employer] by going to Visits < Admission Summaries.  If you've been to the Emergency Department or your doctor's office, you can view your past visit information in My

## (undated) NOTE — LETTER
Patrica Bailey 182  [address] [facility] [address], [address] [facility]  Authorization for Surgical Operation and Procedure     Date:___________                                                                                                         Time:__________ allergic reactions, hemolytic reactions, transmission of diseases such as Hepatitis, AIDS and Cytomegalovirus (CMV) and fluid overload. In the event that I wish to have an autologous transfusion of my own blood, or a directed donor transfusion.   I will di period ends for purposes of reinstating the DNAR order.   10. Patients having a sterilization procedure: I understand that if the procedure is successful the results will be permanent and it will therefore be impossible for me to inseminate, conceive, or be do additional procedures as necessary. Some examples are: Starting or using an “IV” to give me medicine, fluids or blood during my procedure, and having a breathing tube placed to help me breathe when I’m asleep (intubation).  In the event that my heart sto complications include headache, bleeding, infection, seizure, irregular heart rhythms, and nerve injury.     I can change my mind about having anesthesia services at any time before I get the medicine.    ____________________________________________________

## (undated) NOTE — LETTER
07/06/21        [address] [address]      Dear Claude Dexter records indicate that you have outstanding lab work and or testing that was ordered for you and has not yet been completed:  Orders Placed This Encounter

## (undated) NOTE — ED AVS SNAPSHOT
THE [facility] in [place] [address] [address]    Phone:  [phone]    Fax:  [address]   MRN: [id number]    Department:  THE [facility] in [place]   Date of Visit:  6/18/2017 URINARY TRACT INFECTIONS (UTIS), UNDERSTANDING (ENGLISH)      Disclosure     Insurance plans vary and the physician(s) referred by the Immediate Care may not be covered by your plan.  Please contact your insurance company to determine coverage for follow-u CARE PHYSICIAN AT ONCE OR GO TO THE EMERGENCY DEPARTMENT. If you have been prescribed any medication(s), please fill your prescription right away and begin taking the medication(s) as directed.     If the Immediate Care Provider has read X-rays, these wi coverage. Patient [address] is a Federal Navigator program that can help with your Affordable Care Act coverage, as well as all types of Medicaid plans.   To get signed up and covered, please call [phone] and ask to get set up for an insuran

## (undated) NOTE — MR AVS SNAPSHOT
EM[facility] 75TH [facility]5 [address] [address] [phone]               Thank you for choosing us for your health care visit with Judith Pruitt MD.  We are glad to serve you and happy to provide you with this summ Take 1 tablet (75 mcg total) by mouth daily. Commonly known as:  SYNTHROID, LEVOTHROID           LORazepam 1 MG Tabs   Take 1 tablet (1 mg total) by mouth every 8 (eight) hours as needed.    Commonly known as:  ATIVAN                Where to Get Your Medi 150 minutes per week. Moderation of alcohol consumption Men: limit to <= 2 drinks* per day. Women and lighter weight persons: limit to <= 1 drink* per day.               Visit [employer] online at  [email or web address]

## (undated) NOTE — LETTER
07/19/21        [address] [address]      Dear Florina Senior records indicate that you have outstanding lab work and or testing that was ordered for you and has not yet been completed:  Orders Placed This Encounter

## (undated) NOTE — LETTER
08/12/19        [address] [address]      Dear Traci Ramirez records indicate that you have outstanding lab work and or testing that was ordered for you and has not yet been completed:  Orders Placed This Encounter

## (undated) NOTE — MR AVS SNAPSHOT
EM[facility] 75TH [facility]5 [address] [address] [phone]               Thank you for choosing us for your health care visit with Andrew Kerr MD.  We are glad to serve you and happy to provide you with this summ Take 325 mg by mouth daily. Bethanechol Chloride 25 MG Tabs   Take 1 tablet (25 mg total) by mouth 3 (three) times daily. Commonly known as:  URECHOLINE           BuPROPion HCl ER (XL) 150 MG Tb24   Take 1 tablet (150 mg total) by mouth daily.

## (undated) NOTE — MR AVS SNAPSHOT
[facility] 75TH  [address] [address] [phone]               Thank you for choosing us for your health care visit with Krissy Woodard MD.  We are glad to serve you and happy to provide you with this summ 122/76 mmHg 68 64\" 198 lb 12.8 oz 34.11 kg/m2         Current Medications          This list is accurate as of: 1/25/17  2:01 PM.  Always use your most recent med list.                aspirin 325 MG Tabs   Take 325 mg by mouth daily.            Citalopram Educational Information     Your blood pressure indicates you may be at-risk for Hypertension. Please consider the following Lifestyle Modifications. Also, please return for a follow-up Blood Pressure Check in 1 year.      Lifestyle Modification Recommen

## (undated) NOTE — LETTER
Patrica Bailey 182 [address] [facility] [address], 209 [facility]  Authorization for Surgical Operation and Procedure   Date:___________                                                                                            Time:__________  1.  I hereby aut reactions, hemolytic reactions, transmission of diseases such as Hepatitis, AIDS and Cytomegalovirus (CMV) and fluid overload. In the event that I wish to have an autologous transfusion of my own blood, or a directed donor transfusion.   I will discuss thi ends for purposes of reinstating the DNAR order.   10. Patients having a sterilization procedure: I understand that if the procedure is successful the results will be permanent and it will therefore be impossible for me to inseminate, conceive, or bear chil

## (undated) NOTE — LETTER
Kristopher Johnston [address], [place]  [address]  INFORMED CONSENT FOR TRANSFUSION OF BLOOD OR BLOOD PRODUCTS  My physician has informed me of the nature, purpose, benefits and risks of transfusion for blood and blood components that ______________________________________________  (Signature of Patient)                                                            (Responsible party in case of Minor,

## (undated) NOTE — LETTER
[facility]  Brent Miller 61 2[address] [facility], [address] [place] [address]    Consent for Operation    Date: __________________    Time: _______________    1.  I authorize the performance upon Aure Holly the following operation:    Procedure(s):  PLACEMENT OF PUD procedure has been videotaped, the surgeon will obtain the original videotape. The hospital will not be responsible for storage or maintenance of this tape.     6. For the purpose of advancing medical education, I consent to the admittance of observers to t STATEMENTS REQUIRING INSERTION OR COMPLETION WERE FILLED IN.     Signature of Patient:   ___________________________    When the patient is a minor or mentally incompetent to give consent:  Signature of person authorized to consent for patient: ____________ supplements, and pills I can buy without a prescription (including street drugs/illegal medications). Failure to inform my anesthesiologist about these medicines may increase my risk of anesthetic complications.   · If I am allergic to anything or have had Anesthesiologist Signature     Date   Time  I have discussed the procedure and information above with the patient (or patient’s representative) and answered their questions. The patient or their representative has agreed to have anesthesia services.     ___

## (undated) NOTE — LETTER
[facility] [address], [address] [place] [address]  Consent for Procedure/Sedation    Date: ***    Time: ***      {edw ivs consent:1143}

## (undated) NOTE — LETTER
07/05/19        [address] [address]      Dear Bryan Guest records indicate that you have outstanding lab work and or testing that was ordered for you and has not yet been completed:  Orders Placed This Encounter

## (undated) NOTE — LETTER
[facility] [address], [address]  Consent for Procedure/Sedation    Date: 05/10/2021    Time: 0100      1.  I authorize the performance upon [place]by [place] the following:cardiac catheterization, left ventricular cineangiograp ____________________________________________________    Signature of person authorized                                     Relationship to  to consent for patient: _________________________ patient: ___________________    Witness: _________________________

## (undated) NOTE — LETTER
Krsitopher Johnston [address], [place], [place]  [address]  INFORMED CONSENT FOR TRANSFUSION OF BLOOD OR BLOOD PRODUCTS  My physician has informed me of the nature, purpose, benefits and risks of transfusion for blood and blood components that ______________________________________________  (Signature of Patient)                                                            (Responsible party in case of Minor,

## (undated) NOTE — MR AVS SNAPSHOT
After Visit Summary   5/2/2017    Marah Johansen    MRN: [id number]           Visit Information        Provider Department Dept Phone    5/2/2017  8:00 PM Bed1  [facility] [phone]      Allergies as of 5/2/2017  Reviewed on: 4/27/2017

## (undated) NOTE — LETTER
Kristopher Johnston [address], [place], [place]  [address]  INFORMED CONSENT FOR TRANSFUSION OF BLOOD OR BLOOD PRODUCTS  My physician has informed me of the nature, purpose, benefits and risks of transfusion for blood and blood components that ______________________________________________  (Signature of Patient)                                                            (Responsible party in case of Minor,